# Patient Record
Sex: FEMALE | URBAN - METROPOLITAN AREA
[De-identification: names, ages, dates, MRNs, and addresses within clinical notes are randomized per-mention and may not be internally consistent; named-entity substitution may affect disease eponyms.]

---

## 2024-05-24 ENCOUNTER — NUTRITION (OUTPATIENT)
Dept: ENDOCRINOLOGY | Facility: CLINIC | Age: 74
End: 2024-05-24
Payer: MEDICARE

## 2024-05-24 DIAGNOSIS — E11.22 TYPE 2 DIABETES MELLITUS WITH DIABETIC CHRONIC KIDNEY DISEASE (MULTI): ICD-10-CM

## 2024-05-24 DIAGNOSIS — E11.22 TYPE 2 DIABETES MELLITUS WITH DIABETIC CHRONIC KIDNEY DISEASE, UNSPECIFIED CKD STAGE, UNSPECIFIED WHETHER LONG TERM INSULIN USE (MULTI): Primary | ICD-10-CM

## 2024-05-24 PROCEDURE — 97802 MEDICAL NUTRITION INDIV IN: CPT

## 2024-05-24 NOTE — PATIENT INSTRUCTIONS
Switch to a zero sugar pop/soda instead of regular to help your blood sugars  Use the Plate model of eating to help guide preparation of balanced meals   Please call [phone] if you would like to schedule a nutrition follow-up

## 2024-05-24 NOTE — PROGRESS NOTES
Nutrition: Initial Assessment    Reason for Nutrition Visit: Patient is a 73 y.o. female referred for T2DM with diabetic CKD. Referred on 5/1/24 by Dr. Morelia Sosa. Per chart review, patient has history of COPD. There is very minimal information on file for this patient so contacted patient's primary care office to fax labs, current DM medications, most recent primary care note, and patient's weight history.     Nutrition Assessment    Food & Nutrition Related History: Pt presents in office with her daughter-in-law. Uses oxygen and needs wheelchair to ambulate.     Dietary Considerations: None   Allergies: None  Intolerance: None  Appetite: Fair   GI Symptoms : loose stool   Swallowing Difficulty: No problems with swallowing  Dentition : own  Food Preparation: Patient and health aid   Cooking Skills/Barriers: None reported  Grocery Shopping: Patient and health aid   Supplements: Denies   Sleep disorders: obstructive sleep apnea, wears CPAP  Food Insecurity: Denies     Dietary Recall:   Meal 1: Bologna sandwich   Meal 2: spaghetti   Meal 2: fried chicken     Snacks: peanut butter   Beverages: water, pop   Alcohol Intake: none   Self-Identified Challenges: none     Physical Activity: minimal     Labs:  Will update once received from primary care office       Diabetes:  Diagnosed > 20 years ago   Prior Nutrition Education: no recent education   SMBG: Ankush 2 , did not bring reader   Hypoglycemia: None     Current DM Medications/Insulin Regimen:  - Basaglar - states she is taking 45 units   - Glipizide 2.5 mg   - Metformin 500 mg once daily     Nutrition Focused Physical Exam:  Performed/Deferred: Performed    Muscle Wasting:  Temporalis: Mild-Moderate (slight depression)  Pectoralis (Clavicular Region): Mild-Moderate (some protrusion of clavicle)  Deltoid/Trapezius: Mild-Moderate (slight protrusion of acromion process)  Interosseous: Mild-Moderate (slightly depressed area between thumb and forefinger)  Quadriceps:  Severe (depressions on inner and outer thigh)    Loss of Subcutaneous Fat:  Orbital Fat Pads: Mild-Moderate (slight dark circles and slight hollowing)  Buccal Fat Pads: Mild-Moderate (flat cheeks, minimal bounce)  Triceps: Mild-Moderate (less than ample fat tissue)       Other Physical Findings:  Hair: Negative  Eyes: Negative  Mouth: Negative  Skin: Negative  Nails: Negative    Past Medical History:  There is no problem list on file for this patient.       Anthropometrics:  Ht Readings from Last 1 Encounters:   No data found for Ht     BMI Readings from Last 1 Encounters:   No data found for BMI     Wt Readings from Last 10 Encounters:   No data found for Wt     Weight history: Will update once received from primary care office       Nutrition Diagnosis     Patient has Malnutrition Diagnosis: YesDiagnosis Status: New  Malnutrition Diagnosis: Moderate malnutrition related to chronic disease or condition As Evidenced by: increased nutrition needs in the setting of COPD, moderate muscle wasting (clavicle, interosseous, quadricep regions), moderate subcutaneous fat loss (orbital, buccal, tricep regions)    Patient has Nutrition Diagnosis: Yes Diagnosis Status (1): New  Nutrition Diagnosis 1: Food and nutrition related knowledge deficit Related to (1): remote nutrition education for T2DM As Evidenced by (1): patient demonstrates incomplete knowledge       Nutrition Interventions/Recommendations   FOOD & NUTRIENT DELIVERY: Consistent Carbohydrate Diet, Increased Energy Diet, and Increased Fiber Diet    COORDINATION OF CARE: Contacted patient's primary care office for updated labs, last office note, and weight history     NUTRITION EDUCATION:   Provided education on what happens in the body when prediabetes and diabetes develop. Explained why providing consistent amounts of carbohydrates in the diet is helpful for regulating blood sugar. Encouraged choosing foods that contain minimally processed complex carbohydrates,  such as high fiber whole grains, beans, starchy vegetables, whole fruits. Simple sugars from fruit juice, sugar-sweetened beverages, and foods with added sugar should be limited in the diet. Also discussed how foods like protein, some fat, and non-starchy vegetables impact blood sugar regulation.  Provided education on Plate Method as a tool for preparing balanced meals. Discussed the following: Fill 1/2 plate with non-starchy vegetables (broccoli, carrots, cauliflower, salad greens, cucumbers, tomatoes). These foods contribute very few carbohydrates, and they add fiber to the meal. Fill 1/4 plate with lean protein (meat, turkey, fish, seafood, eggs, nuts, cheese, cottage cheese, nut butter, tofu, edamame). Fill 1/4 plate with carbohydrates/starches (grains, fruits, starchy vegetables, beans, yogurt, milk). Aim for at least half of daily grains as whole grains.   Provided education on carbohydrate (carb) counting. Discussed the following:  Foods and beverages that contribute carbohydrates (fruits, grains, legumes, milk, yogurt, starchy vegetables, sugar added to foods, sugar-sweetened beverages)  Foods that contribute no or minimal carbohydrates (protein, fats, non-starchy vegetables)  Discussed replacing regular soda/pop with zero sugar beverages for better blood sugar management.   Discussed that olive oil is the preferred fat source when cooking.     Educational Handouts: Diabetes Plate Method, NCM Carbohydrate Counting for People with Diabetes    *Patient expressed understanding of the education provided and denied any additional questions/concerns.       Nutrition Monitoring and Evaluation   Consistent meal/snack pattern   A1c less than 7-8.0% without hypoglycemia   PO intake > 75% estimated energy intake       Readiness to Change : Good  Level of Understanding : Good  Anticipated Compliant : Good     Follow-up: Patient is welcome to follow-up at any time. To schedule, please call [phone].

## 2024-06-10 ENCOUNTER — HOSPITAL ENCOUNTER (OUTPATIENT)
Dept: NEUROLOGY | Facility: HOSPITAL | Age: 74
Discharge: HOME | End: 2024-06-10
Payer: MEDICARE

## 2024-06-10 DIAGNOSIS — G56.02 CARPAL TUNNEL SYNDROME, LEFT UPPER LIMB: ICD-10-CM

## 2024-06-10 PROCEDURE — 95886 MUSC TEST DONE W/N TEST COMP: CPT | Performed by: PSYCHIATRY & NEUROLOGY

## 2024-06-10 PROCEDURE — 95886 MUSC TEST DONE W/N TEST COMP: CPT | Mod: GC | Performed by: PSYCHIATRY & NEUROLOGY

## 2024-06-10 PROCEDURE — 95911 NRV CNDJ TEST 9-10 STUDIES: CPT | Performed by: PSYCHIATRY & NEUROLOGY

## 2024-06-25 ENCOUNTER — OFFICE VISIT (OUTPATIENT)
Dept: ORTHOPEDIC SURGERY | Facility: CLINIC | Age: 74
End: 2024-06-25
Payer: MEDICARE

## 2024-06-25 DIAGNOSIS — S50.02XA CONTUSION OF LEFT ELBOW, INITIAL ENCOUNTER: Primary | ICD-10-CM

## 2024-06-25 PROCEDURE — 1036F TOBACCO NON-USER: CPT | Performed by: ORTHOPAEDIC SURGERY

## 2024-06-25 PROCEDURE — 1160F RVW MEDS BY RX/DR IN RCRD: CPT | Performed by: ORTHOPAEDIC SURGERY

## 2024-06-25 PROCEDURE — 99213 OFFICE O/P EST LOW 20 MIN: CPT | Performed by: ORTHOPAEDIC SURGERY

## 2024-06-25 PROCEDURE — 1159F MED LIST DOCD IN RCRD: CPT | Performed by: ORTHOPAEDIC SURGERY

## 2024-06-25 PROCEDURE — 99203 OFFICE O/P NEW LOW 30 MIN: CPT | Performed by: ORTHOPAEDIC SURGERY

## 2024-06-25 NOTE — LETTER
June 30, 2024       No Recipients    Patient: Jenifer Hutchinson   YOB: 1950   Date of Visit: 6/25/2024       Dear Dr. Kim Recipients:    Thank you for referring Jenifer Hutchinson to me for evaluation. Below are my notes for this consultation.  If you have questions, please do not hesitate to call me. I look forward to following your patient along with you.       Sincerely,     Jarrell Olivera MD      CC:   No Recipients  ______________________________________________________________________________________    CHIEF COMPLAINT         Left elbow pain    ASSESSMENT + PLAN    Left elbow contusion, resolving    I reviewed that the x-ray does not show any significant bony injury.  Clinically, the pain is gone.  There is a little posterior and anterior bruising which should continue to resolve.  You may advance activity with no particular restrictions.    Follow-up with any additional concerns.        HISTORY OF PRESENT ILLNESS       Patient is a 74 y.o. right-hand dominant female, who presents today for evaluation of a left elbow injury.  This occurred when she fell from bed at the end of last month.  She was seen at [facility] and x-rays were obtained.  There was apparently some concern for fracture and she was placed in a splint.  She presents today, about 3-1/2 weeks later, with complete resolution of her pain.  No numbness or tingling.  No upper extremity concerns.    She has COPD and CHF, and is on oxygen by nasal cannula.  She is diabetic but not hypothyroid.  She does not smoke.      REVIEW OF SYSTEMS       A 30-item multi-system Review Of Systems was obtained on today's intake form.  This was reviewed with the patient and is correct.  The pertinent positives and negatives are listed above.  The form has been scanned separately into the medical record.      PHYSICAL EXAM    Constitutional:    Appears older than her stated age. Well-developed thin, somewhat frail appearing elderly female in no acute  distress..  Psychiatric:         Pleasant normal mood and affect. Behavior is appropriate for the situation.   Head:                   Normocephalic and atraumatic.  Eyes:                    Pupils are equal and round.  Cardiovascular:  2+ radial and ulnar pulses. Fingers well-perfused.  Respiratory:        Effort normal. No respiratory distress. Speaking in complete sentences.  Neurologic:       Alert and oriented to person, place, and time.  Skin:                Skin is intact, warm and dry.  Hematologic / Lymphatic:    No lymphedema or lymphangitis.    Extremities / Musculoskeletal:                      Splint removed from the left upper extremity.  There is a little resolving anterior and posterior ecchymosis but no swelling.  No point tenderness.  Full composite finger flexion extension.  No elbow joint effusion.  Stable collaterals.  No pain with epicondylitis testing.  Symmetric motion.  Sensation intact to light touch in all distributions.  Capillary refill less than 2 seconds.      IMAGING / LABS / EMGs           Outside x-rays were independently interpreted by me today and show no acute fracture, subluxation, or foreign body.  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      No past medical history on file.    Medication Documentation Review Audit    **Prior to Admission medications have not yet been reviewed**         Not on File    Social History     Socioeconomic History   • Marital status: Unknown     Spouse name: Not on file   • Number of children: Not on file   • Years of education: Not on file   • Highest education level: Not on file   Occupational History   • Not on file   Tobacco Use   • Smoking status: Not on file   • Smokeless tobacco: Not on file   Substance and Sexual Activity   • Alcohol use: Not on file   • Drug use: Not on file   • Sexual activity: Not on file   Other Topics Concern   • Not on file   Social History Narrative   • Not on file     Social Determinants of Health      Financial Resource Strain: Low Risk  (1/22/2024)    Received from [facility]    Overall Financial Resource Strain (CARDIA)    • Difficulty of Paying Living Expenses: Not hard at all   Recent Concern: Financial Resource Strain - Medium Risk (1/17/2024)    Received from [facility]    Overall Financial Resource Strain (CARDIA)    • Difficulty of Paying Living Expenses: Somewhat hard   Food Insecurity: No Food Insecurity (1/22/2024)    Received from [facility]    Hunger Vital Sign    • Worried About Running Out of Food in the Last Year: Never true    • Ran Out of Food in the Last Year: Never true   Transportation Needs: No Transportation Needs (1/22/2024)    Received from [facility]    PRAPARE - Transportation    • Lack of Transportation (Medical): No    • Lack of Transportation (Non-Medical): No   Physical Activity: Inactive (1/17/2024)    Received from [facility]    Exercise Vital Sign    • Days of Exercise per Week: 0 days    • Minutes of Exercise per Session: 0 min   Stress: No Stress Concern Present (1/17/2024)    Received from [facility]    [employer] - Occupational Stress Questionnaire    • Feeling of Stress : Only a little   Social Connections: Moderately Isolated (1/17/2024)    Received from [facility]    Social Connection and Isolation Panel [NHANES]    • Frequency of Communication with Friends and Family: More than three times a week    • Frequency of Social Gatherings with Friends and Family: More than three times a week    • Attends Congregation Services: More than 4 times per year    • Active Member of Clubs or Organizations: No    • Attends Club or Organization Meetings: Never    • Marital Status:    Intimate Partner Violence: Not At Risk (1/17/2024)    Received from [facility]    Humiliation, Afraid, Rape, and Kick questionnaire    • Fear of Current or Ex-Partner: No    • Emotionally Abused: No    • Physically Abused: No    • Sexually Abused:  No   Housing Stability: Low Risk  (1/22/2024)    Received from [facility]    Housing Stability Vital Sign    • Unable to Pay for Housing in the Last Year: No    • Number of Places Lived in the Last Year: 2    • Unstable Housing in the Last Year: No       No past surgical history on file.      Electronically Signed      RICH Olivera MD      Orthopaedic Hand Surgery      [phone]

## 2024-06-25 NOTE — PROGRESS NOTES
CHIEF COMPLAINT         Left elbow pain    ASSESSMENT + PLAN    Left elbow contusion, resolving    I reviewed that the x-ray does not show any significant bony injury.  Clinically, the pain is gone.  There is a little posterior and anterior bruising which should continue to resolve.  You may advance activity with no particular restrictions.    Follow-up with any additional concerns.        HISTORY OF PRESENT ILLNESS       Patient is a 74 y.o. right-hand dominant female, who presents today for evaluation of a left elbow injury.  This occurred when she fell from bed at the end of last month.  She was seen at [facility] and x-rays were obtained.  There was apparently some concern for fracture and she was placed in a splint.  She presents today, about 3-1/2 weeks later, with complete resolution of her pain.  No numbness or tingling.  No upper extremity concerns.    She has COPD and CHF, and is on oxygen by nasal cannula.  She is diabetic but not hypothyroid.  She does not smoke.      REVIEW OF SYSTEMS       A 30-item multi-system Review Of Systems was obtained on today's intake form.  This was reviewed with the patient and is correct.  The pertinent positives and negatives are listed above.  The form has been scanned separately into the medical record.      PHYSICAL EXAM    Constitutional:    Appears older than her stated age. Well-developed thin, somewhat frail appearing elderly female in no acute distress..  Psychiatric:         Pleasant normal mood and affect. Behavior is appropriate for the situation.   Head:                   Normocephalic and atraumatic.  Eyes:                    Pupils are equal and round.  Cardiovascular:  2+ radial and ulnar pulses. Fingers well-perfused.  Respiratory:        Effort normal. No respiratory distress. Speaking in complete sentences.  Neurologic:       Alert and oriented to person, place, and time.  Skin:                Skin is intact, warm and dry.  Hematologic / Lymphatic:    No  lymphedema or lymphangitis.    Extremities / Musculoskeletal:                      Splint removed from the left upper extremity.  There is a little resolving anterior and posterior ecchymosis but no swelling.  No point tenderness.  Full composite finger flexion extension.  No elbow joint effusion.  Stable collaterals.  No pain with epicondylitis testing.  Symmetric motion.  Sensation intact to light touch in all distributions.  Capillary refill less than 2 seconds.      IMAGING / LABS / EMGs           Outside x-rays were independently interpreted by me today and show no acute fracture, subluxation, or foreign body.  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      No past medical history on file.    Medication Documentation Review Audit    **Prior to Admission medications have not yet been reviewed**         Not on File    Social History     Socioeconomic History    Marital status: Unknown     Spouse name: Not on file    Number of children: Not on file    Years of education: Not on file    Highest education level: Not on file   Occupational History    Not on file   Tobacco Use    Smoking status: Not on file    Smokeless tobacco: Not on file   Substance and Sexual Activity    Alcohol use: Not on file    Drug use: Not on file    Sexual activity: Not on file   Other Topics Concern    Not on file   Social History Narrative    Not on file     Social Determinants of Health     Financial Resource Strain: Low Risk  (1/22/2024)    Received from [facility]    Overall Financial Resource Strain (CARDIA)     Difficulty of Paying Living Expenses: Not hard at all   Recent Concern: Financial Resource Strain - Medium Risk (1/17/2024)    Received from [facility]    Overall Financial Resource Strain (CARDIA)     Difficulty of Paying Living Expenses: Somewhat hard   Food Insecurity: No Food Insecurity (1/22/2024)    Received from [facility]    Hunger Vital Sign     Worried About Running Out of Food in the  Last Year: Never true     Ran Out of Food in the Last Year: Never true   Transportation Needs: No Transportation Needs (1/22/2024)    Received from [facility]    PRAPARE - Transportation     Lack of Transportation (Medical): No     Lack of Transportation (Non-Medical): No   Physical Activity: Inactive (1/17/2024)    Received from [facility]    Exercise Vital Sign     Days of Exercise per Week: 0 days     Minutes of Exercise per Session: 0 min   Stress: No Stress Concern Present (1/17/2024)    Received from [facility]    [employer] - Occupational Stress Questionnaire     Feeling of Stress : Only a little   Social Connections: Moderately Isolated (1/17/2024)    Received from [facility]    Social Connection and Isolation Panel [NHANES]     Frequency of Communication with Friends and Family: More than three times a week     Frequency of Social Gatherings with Friends and Family: More than three times a week     Attends Alevism Services: More than 4 times per year     Active Member of Clubs or Organizations: No     Attends Club or Organization Meetings: Never     Marital Status:    Intimate Partner Violence: Not At Risk (1/17/2024)    Received from [facility]    Humiliation, Afraid, Rape, and Kick questionnaire     Fear of Current or Ex-Partner: No     Emotionally Abused: No     Physically Abused: No     Sexually Abused: No   Housing Stability: Low Risk  (1/22/2024)    Received from [facility]    Housing Stability Vital Sign     Unable to Pay for Housing in the Last Year: No     Number of Places Lived in the Last Year: 2     Unstable Housing in the Last Year: No       No past surgical history on file.      Electronically Signed      RICH Olivera MD      Orthopaedic Hand Surgery      [phone]

## 2024-07-10 ENCOUNTER — APPOINTMENT (OUTPATIENT)
Dept: OPHTHALMOLOGY | Facility: CLINIC | Age: 74
End: 2024-07-10
Payer: MEDICARE

## 2024-12-11 ENCOUNTER — APPOINTMENT (OUTPATIENT)
Dept: OPHTHALMOLOGY | Facility: CLINIC | Age: 74
End: 2024-12-11
Payer: MEDICARE

## 2025-04-18 ENCOUNTER — HOSPITAL ENCOUNTER (INPATIENT)
Facility: HOSPITAL | Age: 75
DRG: 208 | End: 2025-04-18
Attending: EMERGENCY MEDICINE | Admitting: INTERNAL MEDICINE
Payer: MEDICARE

## 2025-04-18 ENCOUNTER — HOSPITAL ENCOUNTER (EMERGENCY)
Facility: HOSPITAL | Age: 75
Discharge: HOME | DRG: 208 | End: 2025-04-20
Payer: MEDICARE

## 2025-04-18 DIAGNOSIS — I50.33 HYPERTENSIVE HEART DISEASE WITH ACUTE ON CHRONIC DIASTOLIC CONGESTIVE HEART FAILURE: ICD-10-CM

## 2025-04-18 DIAGNOSIS — I11.0 HYPERTENSIVE HEART DISEASE WITH ACUTE ON CHRONIC DIASTOLIC CONGESTIVE HEART FAILURE: ICD-10-CM

## 2025-04-18 DIAGNOSIS — J69.0 ASPIRATION PNEUMONIA OF RIGHT LOWER LOBE, UNSPECIFIED ASPIRATION PNEUMONIA TYPE (MULTI): ICD-10-CM

## 2025-04-18 DIAGNOSIS — J96.22 ACUTE ON CHRONIC RESPIRATORY FAILURE WITH HYPOXIA AND HYPERCAPNIA: ICD-10-CM

## 2025-04-18 DIAGNOSIS — J96.21 ACUTE ON CHRONIC RESPIRATORY FAILURE WITH HYPOXIA AND HYPERCAPNIA: ICD-10-CM

## 2025-04-18 DIAGNOSIS — J96.22 ACUTE ON CHRONIC RESPIRATORY FAILURE WITH HYPERCAPNIA: ICD-10-CM

## 2025-04-18 DIAGNOSIS — I12.9 HYPERTENSIVE NEPHROPATHY: ICD-10-CM

## 2025-04-18 DIAGNOSIS — F41.9 ANXIETY: ICD-10-CM

## 2025-04-18 DIAGNOSIS — J18.9 PNEUMONIA OF RIGHT LOWER LOBE DUE TO INFECTIOUS ORGANISM: Primary | ICD-10-CM

## 2025-04-18 DIAGNOSIS — R19.7 DIARRHEA, UNSPECIFIED TYPE: ICD-10-CM

## 2025-04-18 LAB
ALBUMIN SERPL BCP-MCNC: 3.3 G/DL (ref 3.4–5)
ALP SERPL-CCNC: 214 U/L (ref 33–136)
ALT SERPL W P-5'-P-CCNC: 74 U/L (ref 7–45)
ANION GAP BLDA CALCULATED.4IONS-SCNC: 5 MMO/L (ref 10–25)
ANION GAP BLDA CALCULATED.4IONS-SCNC: 5 MMO/L (ref 10–25)
ANION GAP BLDV CALCULATED.4IONS-SCNC: 6 MMOL/L (ref 10–25)
ANION GAP BLDV CALCULATED.4IONS-SCNC: 8 MMOL/L (ref 10–25)
ANION GAP BLDV CALCULATED.4IONS-SCNC: 9 MMOL/L (ref 10–25)
ANION GAP SERPL CALC-SCNC: 9 MMOL/L (ref 10–20)
AST SERPL W P-5'-P-CCNC: 63 U/L (ref 9–39)
BASE EXCESS BLDA CALC-SCNC: 2 MMOL/L (ref -2–3)
BASE EXCESS BLDA CALC-SCNC: 3.1 MMOL/L (ref -2–3)
BASE EXCESS BLDV CALC-SCNC: -0.1 MMOL/L (ref -2–3)
BASE EXCESS BLDV CALC-SCNC: 0.5 MMOL/L (ref -2–3)
BASE EXCESS BLDV CALC-SCNC: 0.8 MMOL/L (ref -2–3)
BASE EXCESS BLDV CALC-SCNC: 1.4 MMOL/L (ref -2–3)
BASE EXCESS BLDV CALC-SCNC: 1.9 MMOL/L (ref -2–3)
BASOPHILS # BLD AUTO: 0.05 X10*3/UL (ref 0–0.1)
BASOPHILS NFR BLD AUTO: 0.4 %
BILIRUB SERPL-MCNC: 0.2 MG/DL (ref 0–1.2)
BNP SERPL-MCNC: 1702 PG/ML (ref 0–99)
BODY TEMPERATURE: 37 DEGREES CELSIUS
BUN SERPL-MCNC: 21 MG/DL (ref 6–23)
CA-I BLDA-SCNC: 1.27 MMOL/L (ref 1.1–1.33)
CA-I BLDA-SCNC: 1.38 MMOL/L (ref 1.1–1.33)
CA-I BLDV-SCNC: 1.34 MMOL/L (ref 1.1–1.33)
CA-I BLDV-SCNC: 1.38 MMOL/L (ref 1.1–1.33)
CA-I BLDV-SCNC: 1.39 MMOL/L (ref 1.1–1.33)
CALCIUM SERPL-MCNC: 9.2 MG/DL (ref 8.6–10.3)
CARDIAC TROPONIN I PNL SERPL HS: 11 NG/L (ref 0–13)
CHLORIDE BLDA-SCNC: 93 MMOL/L (ref 98–107)
CHLORIDE BLDA-SCNC: 93 MMOL/L (ref 98–107)
CHLORIDE BLDV-SCNC: 92 MMOL/L (ref 98–107)
CHLORIDE BLDV-SCNC: 93 MMOL/L (ref 98–107)
CHLORIDE BLDV-SCNC: 93 MMOL/L (ref 98–107)
CHLORIDE SERPL-SCNC: 93 MMOL/L (ref 98–107)
CO2 SERPL-SCNC: 31 MMOL/L (ref 21–32)
CREAT SERPL-MCNC: 0.48 MG/DL (ref 0.5–1.05)
EGFRCR SERPLBLD CKD-EPI 2021: >90 ML/MIN/1.73M*2
EOSINOPHIL # BLD AUTO: 0.19 X10*3/UL (ref 0–0.4)
EOSINOPHIL NFR BLD AUTO: 1.4 %
ERYTHROCYTE [DISTWIDTH] IN BLOOD BY AUTOMATED COUNT: 16.2 % (ref 11.5–14.5)
FLUAV RNA RESP QL NAA+PROBE: NOT DETECTED
FLUBV RNA RESP QL NAA+PROBE: NOT DETECTED
GLUCOSE BLD MANUAL STRIP-MCNC: 271 MG/DL (ref 74–99)
GLUCOSE BLD MANUAL STRIP-MCNC: 271 MG/DL (ref 74–99)
GLUCOSE BLDA-MCNC: 222 MG/DL (ref 74–99)
GLUCOSE BLDA-MCNC: 304 MG/DL (ref 74–99)
GLUCOSE BLDV-MCNC: 214 MG/DL (ref 74–99)
GLUCOSE BLDV-MCNC: 215 MG/DL (ref 74–99)
GLUCOSE BLDV-MCNC: 217 MG/DL (ref 74–99)
GLUCOSE BLDV-MCNC: 230 MG/DL (ref 74–99)
GLUCOSE BLDV-MCNC: 268 MG/DL (ref 74–99)
GLUCOSE SERPL-MCNC: 207 MG/DL (ref 74–99)
HCO3 BLDA-SCNC: 30 MMOL/L (ref 22–26)
HCO3 BLDA-SCNC: 32.6 MMOL/L (ref 22–26)
HCO3 BLDV-SCNC: 30.6 MMOL/L (ref 22–26)
HCO3 BLDV-SCNC: 30.9 MMOL/L (ref 22–26)
HCO3 BLDV-SCNC: 31.1 MMOL/L (ref 22–26)
HCO3 BLDV-SCNC: 31.2 MMOL/L (ref 22–26)
HCO3 BLDV-SCNC: 32.9 MMOL/L (ref 22–26)
HCT VFR BLD AUTO: 24.6 % (ref 36–46)
HCT VFR BLD EST: 21 % (ref 36–46)
HCT VFR BLD EST: 23 % (ref 36–46)
HGB BLD-MCNC: 7.3 G/DL (ref 12–16)
HGB BLDA-MCNC: 7.1 G/DL (ref 12–16)
HGB BLDA-MCNC: 7.7 G/DL (ref 12–16)
HGB BLDV-MCNC: 7 G/DL (ref 12–16)
HGB BLDV-MCNC: 7.1 G/DL (ref 12–16)
HGB BLDV-MCNC: 7.5 G/DL (ref 12–16)
HGB BLDV-MCNC: 7.6 G/DL (ref 12–16)
HGB BLDV-MCNC: 7.7 G/DL (ref 12–16)
IMM GRANULOCYTES # BLD AUTO: 0.59 X10*3/UL (ref 0–0.5)
IMM GRANULOCYTES NFR BLD AUTO: 4.4 % (ref 0–0.9)
INHALED O2 CONCENTRATION: 100 %
INHALED O2 CONCENTRATION: 100 %
INHALED O2 CONCENTRATION: 40 %
INHALED O2 CONCENTRATION: 65 %
INHALED O2 CONCENTRATION: 80 %
LACTATE BLDA-SCNC: 0.5 MMOL/L (ref 0.4–2)
LACTATE BLDA-SCNC: <0.3 MMOL/L (ref 0.4–2)
LACTATE BLDV-SCNC: 0.4 MMOL/L (ref 0.4–2)
LACTATE BLDV-SCNC: <0.3 MMOL/L (ref 0.4–2)
LACTATE SERPL-SCNC: 0.2 MMOL/L (ref 0.4–2)
LYMPHOCYTES # BLD AUTO: 1.85 X10*3/UL (ref 0.8–3)
LYMPHOCYTES NFR BLD AUTO: 13.7 %
MAGNESIUM SERPL-MCNC: 1.82 MG/DL (ref 1.6–2.4)
MCH RBC QN AUTO: 27.4 PG (ref 26–34)
MCHC RBC AUTO-ENTMCNC: 29.7 G/DL (ref 32–36)
MCV RBC AUTO: 93 FL (ref 80–100)
MONOCYTES # BLD AUTO: 0.87 X10*3/UL (ref 0.05–0.8)
MONOCYTES NFR BLD AUTO: 6.5 %
MRSA DNA SPEC QL NAA+PROBE: NOT DETECTED
NEUTROPHILS # BLD AUTO: 9.91 X10*3/UL (ref 1.6–5.5)
NEUTROPHILS NFR BLD AUTO: 73.6 %
NRBC BLD-RTO: 0.4 /100 WBCS (ref 0–0)
OXYHGB MFR BLDA: 97.3 % (ref 94–98)
OXYHGB MFR BLDA: 97.5 % (ref 94–98)
OXYHGB MFR BLDV: 71.8 % (ref 45–75)
OXYHGB MFR BLDV: 87.8 % (ref 45–75)
OXYHGB MFR BLDV: 92.5 % (ref 45–75)
OXYHGB MFR BLDV: 96.1 % (ref 45–75)
OXYHGB MFR BLDV: 96.6 % (ref 45–75)
PCO2 BLDA: 105 MM HG (ref 38–42)
PCO2 BLDA: 61 MM HG (ref 38–42)
PCO2 BLDV: 103 MM HG (ref 41–51)
PCO2 BLDV: 105 MM HG (ref 41–51)
PCO2 BLDV: 109 MM HG (ref 41–51)
PCO2 BLDV: 111 MM HG (ref 41–51)
PCO2 BLDV: 84 MM HG (ref 41–51)
PH BLDA: 7.1 PH (ref 7.38–7.42)
PH BLDA: 7.3 PH (ref 7.38–7.42)
PH BLDV: 7.06 PH (ref 7.33–7.43)
PH BLDV: 7.08 PH (ref 7.33–7.43)
PH BLDV: 7.08 PH (ref 7.33–7.43)
PH BLDV: 7.09 PH (ref 7.33–7.43)
PH BLDV: 7.17 PH (ref 7.33–7.43)
PHOSPHATE SERPL-MCNC: 4.5 MG/DL (ref 2.5–4.9)
PLATELET # BLD AUTO: 285 X10*3/UL (ref 150–450)
PO2 BLDA: 172 MM HG (ref 85–95)
PO2 BLDA: 333 MM HG (ref 85–95)
PO2 BLDV: 108 MM HG (ref 35–45)
PO2 BLDV: 128 MM HG (ref 35–45)
PO2 BLDV: 45 MM HG (ref 35–45)
PO2 BLDV: 64 MM HG (ref 35–45)
PO2 BLDV: 72 MM HG (ref 35–45)
POTASSIUM BLDA-SCNC: 4.9 MMOL/L (ref 3.5–5.3)
POTASSIUM BLDA-SCNC: 5.2 MMOL/L (ref 3.5–5.3)
POTASSIUM BLDV-SCNC: 5.2 MMOL/L (ref 3.5–5.3)
POTASSIUM BLDV-SCNC: 5.2 MMOL/L (ref 3.5–5.3)
POTASSIUM BLDV-SCNC: 5.3 MMOL/L (ref 3.5–5.3)
POTASSIUM BLDV-SCNC: 5.3 MMOL/L (ref 3.5–5.3)
POTASSIUM BLDV-SCNC: 5.4 MMOL/L (ref 3.5–5.3)
POTASSIUM SERPL-SCNC: 5 MMOL/L (ref 3.5–5.3)
PROT SERPL-MCNC: 6.6 G/DL (ref 6.4–8.2)
RBC # BLD AUTO: 2.66 X10*6/UL (ref 4–5.2)
RSV RNA RESP QL NAA+PROBE: NOT DETECTED
SAO2 % BLDA: 100 % (ref 94–100)
SAO2 % BLDA: 100 % (ref 94–100)
SAO2 % BLDV: 74 % (ref 45–75)
SAO2 % BLDV: 90 % (ref 45–75)
SAO2 % BLDV: 95 % (ref 45–75)
SAO2 % BLDV: 99 % (ref 45–75)
SAO2 % BLDV: 99 % (ref 45–75)
SARS-COV-2 RNA RESP QL NAA+PROBE: NOT DETECTED
SODIUM BLDA-SCNC: 123 MMOL/L (ref 136–145)
SODIUM BLDA-SCNC: 125 MMOL/L (ref 136–145)
SODIUM BLDV-SCNC: 124 MMOL/L (ref 136–145)
SODIUM BLDV-SCNC: 126 MMOL/L (ref 136–145)
SODIUM BLDV-SCNC: 126 MMOL/L (ref 136–145)
SODIUM BLDV-SCNC: 128 MMOL/L (ref 136–145)
SODIUM BLDV-SCNC: 128 MMOL/L (ref 136–145)
SODIUM SERPL-SCNC: 128 MMOL/L (ref 136–145)
WBC # BLD AUTO: 13.5 X10*3/UL (ref 4.4–11.3)

## 2025-04-18 PROCEDURE — 5A1945Z RESPIRATORY VENTILATION, 24-96 CONSECUTIVE HOURS: ICD-10-PCS

## 2025-04-18 PROCEDURE — 2500000004 HC RX 250 GENERAL PHARMACY W/ HCPCS (ALT 636 FOR OP/ED): Mod: JZ

## 2025-04-18 PROCEDURE — 2020000001 HC ICU ROOM DAILY

## 2025-04-18 PROCEDURE — 83880 ASSAY OF NATRIURETIC PEPTIDE: CPT

## 2025-04-18 PROCEDURE — 2500000004 HC RX 250 GENERAL PHARMACY W/ HCPCS (ALT 636 FOR OP/ED): Mod: JZ | Performed by: INTERNAL MEDICINE

## 2025-04-18 PROCEDURE — 87040 BLOOD CULTURE FOR BACTERIA: CPT | Mod: AHULAB

## 2025-04-18 PROCEDURE — 36415 COLL VENOUS BLD VENIPUNCTURE: CPT

## 2025-04-18 PROCEDURE — 2500000002 HC RX 250 W HCPCS SELF ADMINISTERED DRUGS (ALT 637 FOR MEDICARE OP, ALT 636 FOR OP/ED)

## 2025-04-18 PROCEDURE — 82435 ASSAY OF BLOOD CHLORIDE: CPT | Performed by: EMERGENCY MEDICINE

## 2025-04-18 PROCEDURE — 71045 X-RAY EXAM CHEST 1 VIEW: CPT | Performed by: STUDENT IN AN ORGANIZED HEALTH CARE EDUCATION/TRAINING PROGRAM

## 2025-04-18 PROCEDURE — 2500000002 HC RX 250 W HCPCS SELF ADMINISTERED DRUGS (ALT 637 FOR MEDICARE OP, ALT 636 FOR OP/ED): Performed by: INTERNAL MEDICINE

## 2025-04-18 PROCEDURE — 94799 UNLISTED PULMONARY SVC/PX: CPT

## 2025-04-18 PROCEDURE — 82947 ASSAY GLUCOSE BLOOD QUANT: CPT

## 2025-04-18 PROCEDURE — 84132 ASSAY OF SERUM POTASSIUM: CPT

## 2025-04-18 PROCEDURE — 94640 AIRWAY INHALATION TREATMENT: CPT

## 2025-04-18 PROCEDURE — 4500999001 HC ED NO CHARGE

## 2025-04-18 PROCEDURE — 99283 EMERGENCY DEPT VISIT LOW MDM: CPT

## 2025-04-18 PROCEDURE — 84484 ASSAY OF TROPONIN QUANT: CPT

## 2025-04-18 PROCEDURE — 82947 ASSAY GLUCOSE BLOOD QUANT: CPT | Performed by: EMERGENCY MEDICINE

## 2025-04-18 PROCEDURE — 2500000001 HC RX 250 WO HCPCS SELF ADMINISTERED DRUGS (ALT 637 FOR MEDICARE OP)

## 2025-04-18 PROCEDURE — 85025 COMPLETE CBC W/AUTO DIFF WBC: CPT

## 2025-04-18 PROCEDURE — 0B21XFZ CHANGE TRACHEOSTOMY DEVICE IN TRACHEA, EXTERNAL APPROACH: ICD-10-PCS

## 2025-04-18 PROCEDURE — 82435 ASSAY OF BLOOD CHLORIDE: CPT

## 2025-04-18 PROCEDURE — 2500000004 HC RX 250 GENERAL PHARMACY W/ HCPCS (ALT 636 FOR OP/ED)

## 2025-04-18 PROCEDURE — 94002 VENT MGMT INPAT INIT DAY: CPT | Mod: CCI

## 2025-04-18 PROCEDURE — 83605 ASSAY OF LACTIC ACID: CPT

## 2025-04-18 PROCEDURE — 83605 ASSAY OF LACTIC ACID: CPT | Performed by: EMERGENCY MEDICINE

## 2025-04-18 PROCEDURE — 96368 THER/DIAG CONCURRENT INF: CPT

## 2025-04-18 PROCEDURE — 96375 TX/PRO/DX INJ NEW DRUG ADDON: CPT

## 2025-04-18 PROCEDURE — 99291 CRITICAL CARE FIRST HOUR: CPT | Performed by: INTERNAL MEDICINE

## 2025-04-18 PROCEDURE — 87637 SARSCOV2&INF A&B&RSV AMP PRB: CPT

## 2025-04-18 PROCEDURE — 83735 ASSAY OF MAGNESIUM: CPT

## 2025-04-18 PROCEDURE — 82330 ASSAY OF CALCIUM: CPT | Performed by: EMERGENCY MEDICINE

## 2025-04-18 PROCEDURE — 99291 CRITICAL CARE FIRST HOUR: CPT | Mod: 25 | Performed by: EMERGENCY MEDICINE

## 2025-04-18 PROCEDURE — 96365 THER/PROPH/DIAG IV INF INIT: CPT

## 2025-04-18 PROCEDURE — 99285 EMERGENCY DEPT VISIT HI MDM: CPT | Mod: 25 | Performed by: EMERGENCY MEDICINE

## 2025-04-18 PROCEDURE — 2500000002 HC RX 250 W HCPCS SELF ADMINISTERED DRUGS (ALT 637 FOR MEDICARE OP, ALT 636 FOR OP/ED): Mod: JZ | Performed by: EMERGENCY MEDICINE

## 2025-04-18 PROCEDURE — 87641 MR-STAPH DNA AMP PROBE: CPT

## 2025-04-18 PROCEDURE — 82805 BLOOD GASES W/O2 SATURATION: CPT

## 2025-04-18 PROCEDURE — 84100 ASSAY OF PHOSPHORUS: CPT

## 2025-04-18 RX ORDER — AMMONIUM LACTATE 12 G/100G
1 LOTION TOPICAL DAILY
Status: DISCONTINUED | OUTPATIENT
Start: 2025-04-19 | End: 2025-04-25 | Stop reason: HOSPADM

## 2025-04-18 RX ORDER — LORAZEPAM 2 MG/ML
2 INJECTION INTRAMUSCULAR ONCE
Status: COMPLETED | OUTPATIENT
Start: 2025-04-18 | End: 2025-04-18

## 2025-04-18 RX ORDER — IPRATROPIUM BROMIDE AND ALBUTEROL SULFATE 2.5; .5 MG/3ML; MG/3ML
3 SOLUTION RESPIRATORY (INHALATION) 4 TIMES DAILY PRN
COMMUNITY
Start: 2025-02-19

## 2025-04-18 RX ORDER — SODIUM CHLORIDE 1000 MG
2 TABLET, SOLUBLE MISCELLANEOUS 3 TIMES DAILY
COMMUNITY

## 2025-04-18 RX ORDER — FAMOTIDINE 10 MG/ML
20 INJECTION, SOLUTION INTRAVENOUS 2 TIMES DAILY
Status: DISCONTINUED | OUTPATIENT
Start: 2025-04-18 | End: 2025-04-20

## 2025-04-18 RX ORDER — CARVEDILOL 25 MG/1
25 TABLET ORAL 2 TIMES DAILY
Status: DISCONTINUED | OUTPATIENT
Start: 2025-04-18 | End: 2025-04-25 | Stop reason: HOSPADM

## 2025-04-18 RX ORDER — CITALOPRAM 10 MG/1
10 TABLET ORAL DAILY
COMMUNITY
Start: 2024-12-03

## 2025-04-18 RX ORDER — FAMOTIDINE 20 MG/1
20 TABLET, FILM COATED ORAL 2 TIMES DAILY
Status: DISCONTINUED | OUTPATIENT
Start: 2025-04-18 | End: 2025-04-20

## 2025-04-18 RX ORDER — PHENYLEPHRINE HCL IN 0.9% NACL 1 MG/10 ML
SYRINGE (ML) INTRAVENOUS
Status: DISCONTINUED
Start: 2025-04-18 | End: 2025-04-18 | Stop reason: WASHOUT

## 2025-04-18 RX ORDER — ALBUTEROL SULFATE 0.83 MG/ML
15 SOLUTION RESPIRATORY (INHALATION) ONCE
Status: COMPLETED | OUTPATIENT
Start: 2025-04-18 | End: 2025-04-18

## 2025-04-18 RX ORDER — ATORVASTATIN CALCIUM 40 MG/1
40 TABLET, FILM COATED ORAL NIGHTLY
Status: DISCONTINUED | OUTPATIENT
Start: 2025-04-18 | End: 2025-04-25 | Stop reason: HOSPADM

## 2025-04-18 RX ORDER — TAMSULOSIN HYDROCHLORIDE 0.4 MG/1
0.4 CAPSULE ORAL NIGHTLY
Status: DISCONTINUED | OUTPATIENT
Start: 2025-04-18 | End: 2025-04-18

## 2025-04-18 RX ORDER — CITALOPRAM 20 MG/1
10 TABLET, FILM COATED ORAL DAILY
Status: DISCONTINUED | OUTPATIENT
Start: 2025-04-19 | End: 2025-04-18

## 2025-04-18 RX ORDER — TAMSULOSIN HYDROCHLORIDE 0.4 MG/1
0.4 CAPSULE ORAL NIGHTLY
COMMUNITY

## 2025-04-18 RX ORDER — AMMONIUM LACTATE 12 G/100G
1 LOTION TOPICAL DAILY
COMMUNITY
Start: 2025-03-19

## 2025-04-18 RX ORDER — INSULIN LISPRO 100 [IU]/ML
0-10 INJECTION, SOLUTION INTRAVENOUS; SUBCUTANEOUS EVERY 4 HOURS
Status: DISCONTINUED | OUTPATIENT
Start: 2025-04-18 | End: 2025-04-20

## 2025-04-18 RX ORDER — ASPIRIN 81 MG/1
81 TABLET ORAL DAILY
COMMUNITY
Start: 2025-03-23

## 2025-04-18 RX ORDER — DEXTROSE 50 % IN WATER (D50W) INTRAVENOUS SYRINGE
25
Status: DISCONTINUED | OUTPATIENT
Start: 2025-04-18 | End: 2025-04-25 | Stop reason: HOSPADM

## 2025-04-18 RX ORDER — APIXABAN 5 MG/1
5 TABLET, FILM COATED ORAL 2 TIMES DAILY
COMMUNITY
Start: 2025-04-16

## 2025-04-18 RX ORDER — LOSARTAN POTASSIUM 50 MG/1
100 TABLET ORAL DAILY
Status: DISCONTINUED | OUTPATIENT
Start: 2025-04-19 | End: 2025-04-18

## 2025-04-18 RX ORDER — MAGNESIUM SULFATE HEPTAHYDRATE 40 MG/ML
2 INJECTION, SOLUTION INTRAVENOUS ONCE
Status: COMPLETED | OUTPATIENT
Start: 2025-04-18 | End: 2025-04-18

## 2025-04-18 RX ORDER — ENOXAPARIN SODIUM 100 MG/ML
40 INJECTION SUBCUTANEOUS EVERY 24 HOURS
Status: DISCONTINUED | OUTPATIENT
Start: 2025-04-18 | End: 2025-04-18

## 2025-04-18 RX ORDER — LOSARTAN POTASSIUM 50 MG/1
100 TABLET ORAL DAILY
COMMUNITY

## 2025-04-18 RX ORDER — PANTOPRAZOLE SODIUM 40 MG/1
40 TABLET, DELAYED RELEASE ORAL
COMMUNITY
Start: 2025-04-16

## 2025-04-18 RX ORDER — IPRATROPIUM BROMIDE AND ALBUTEROL SULFATE 2.5; .5 MG/3ML; MG/3ML
3 SOLUTION RESPIRATORY (INHALATION)
Status: DISCONTINUED | OUTPATIENT
Start: 2025-04-18 | End: 2025-04-22

## 2025-04-18 RX ORDER — FLUTICASONE PROPIONATE 50 MCG
1 SPRAY, SUSPENSION (ML) NASAL DAILY
COMMUNITY
Start: 2024-12-03

## 2025-04-18 RX ORDER — SODIUM CHLORIDE 1000 MG
2 TABLET, SOLUBLE MISCELLANEOUS 3 TIMES DAILY
Status: DISCONTINUED | OUTPATIENT
Start: 2025-04-18 | End: 2025-04-25 | Stop reason: HOSPADM

## 2025-04-18 RX ORDER — LORAZEPAM 1 MG/1
1 TABLET ORAL EVERY 4 HOURS PRN
COMMUNITY
End: 2025-04-25 | Stop reason: HOSPADM

## 2025-04-18 RX ORDER — IPRATROPIUM BROMIDE AND ALBUTEROL SULFATE 2.5; .5 MG/3ML; MG/3ML
9 SOLUTION RESPIRATORY (INHALATION) ONCE
Status: COMPLETED | OUTPATIENT
Start: 2025-04-18 | End: 2025-04-18

## 2025-04-18 RX ORDER — OXYCODONE HYDROCHLORIDE 5 MG/1
5 TABLET ORAL EVERY 8 HOURS PRN
COMMUNITY

## 2025-04-18 RX ORDER — DEXTROSE 50 % IN WATER (D50W) INTRAVENOUS SYRINGE
12.5
Status: DISCONTINUED | OUTPATIENT
Start: 2025-04-18 | End: 2025-04-25 | Stop reason: HOSPADM

## 2025-04-18 RX ORDER — CARVEDILOL 25 MG/1
25 TABLET ORAL 2 TIMES DAILY
COMMUNITY
Start: 2025-04-16

## 2025-04-18 RX ORDER — ATORVASTATIN CALCIUM 40 MG/1
40 TABLET, FILM COATED ORAL NIGHTLY
COMMUNITY
Start: 2025-04-16

## 2025-04-18 RX ORDER — LORAZEPAM 2 MG/ML
INJECTION INTRAMUSCULAR
Status: COMPLETED
Start: 2025-04-18 | End: 2025-04-18

## 2025-04-18 RX ADMIN — PIPERACILLIN SODIUM AND TAZOBACTAM SODIUM 4.5 G: 4; .5 INJECTION, SOLUTION INTRAVENOUS at 15:04

## 2025-04-18 RX ADMIN — PIPERACILLIN SODIUM AND TAZOBACTAM SODIUM 3.38 G: 3; .375 INJECTION, SOLUTION INTRAVENOUS at 21:00

## 2025-04-18 RX ADMIN — IPRATROPIUM BROMIDE AND ALBUTEROL SULFATE 9 ML: .5; 3 SOLUTION RESPIRATORY (INHALATION) at 14:22

## 2025-04-18 RX ADMIN — ATORVASTATIN CALCIUM 40 MG: 40 TABLET, FILM COATED ORAL at 21:51

## 2025-04-18 RX ADMIN — AZITHROMYCIN MONOHYDRATE 500 MG: 500 INJECTION, POWDER, LYOPHILIZED, FOR SOLUTION INTRAVENOUS at 16:10

## 2025-04-18 RX ADMIN — MAGNESIUM SULFATE HEPTAHYDRATE 2 G: 40 INJECTION, SOLUTION INTRAVENOUS at 15:03

## 2025-04-18 RX ADMIN — SODIUM CHLORIDE 500 ML: 0.9 INJECTION, SOLUTION INTRAVENOUS at 17:20

## 2025-04-18 RX ADMIN — LORAZEPAM 2 MG: 2 INJECTION INTRAMUSCULAR; INTRAVENOUS at 17:20

## 2025-04-18 RX ADMIN — APIXABAN 5 MG: 5 TABLET, FILM COATED ORAL at 21:51

## 2025-04-18 RX ADMIN — SODIUM CHLORIDE 2 G: 1 TABLET ORAL at 21:51

## 2025-04-18 RX ADMIN — ALBUTEROL SULFATE 15 MG: 2.5 SOLUTION RESPIRATORY (INHALATION) at 16:42

## 2025-04-18 RX ADMIN — LORAZEPAM 2 MG: 2 INJECTION INTRAMUSCULAR at 17:20

## 2025-04-18 RX ADMIN — METHYLPREDNISOLONE SODIUM SUCCINATE 125 MG: 125 INJECTION, POWDER, FOR SOLUTION INTRAMUSCULAR; INTRAVENOUS at 15:17

## 2025-04-18 RX ADMIN — IPRATROPIUM BROMIDE AND ALBUTEROL SULFATE 3 ML: 2.5; .5 SOLUTION RESPIRATORY (INHALATION) at 23:18

## 2025-04-18 RX ADMIN — VANCOMYCIN HYDROCHLORIDE 1.25 G: 1.25 INJECTION, POWDER, LYOPHILIZED, FOR SOLUTION INTRAVENOUS at 17:19

## 2025-04-18 SDOH — SOCIAL STABILITY: SOCIAL INSECURITY: DO YOU FEEL UNSAFE GOING BACK TO THE PLACE WHERE YOU ARE LIVING?: UNABLE TO ASSESS

## 2025-04-18 SDOH — SOCIAL STABILITY: SOCIAL INSECURITY: ABUSE: ADULT

## 2025-04-18 SDOH — SOCIAL STABILITY: SOCIAL INSECURITY: DO YOU FEEL ANYONE HAS EXPLOITED OR TAKEN ADVANTAGE OF YOU FINANCIALLY OR OF YOUR PERSONAL PROPERTY?: UNABLE TO ASSESS

## 2025-04-18 SDOH — SOCIAL STABILITY: SOCIAL INSECURITY: HAVE YOU HAD ANY THOUGHTS OF HARMING ANYONE ELSE?: UNABLE TO ASSESS

## 2025-04-18 SDOH — SOCIAL STABILITY: SOCIAL INSECURITY: DOES ANYONE TRY TO KEEP YOU FROM HAVING/CONTACTING OTHER FRIENDS OR DOING THINGS OUTSIDE YOUR HOME?: UNABLE TO ASSESS

## 2025-04-18 SDOH — SOCIAL STABILITY: SOCIAL INSECURITY: ARE THERE ANY APPARENT SIGNS OF INJURIES/BEHAVIORS THAT COULD BE RELATED TO ABUSE/NEGLECT?: UNABLE TO ASSESS

## 2025-04-18 SDOH — SOCIAL STABILITY: SOCIAL INSECURITY: HAVE YOU HAD THOUGHTS OF HARMING ANYONE ELSE?: UNABLE TO ASSESS

## 2025-04-18 SDOH — SOCIAL STABILITY: SOCIAL INSECURITY: WERE YOU ABLE TO COMPLETE ALL THE BEHAVIORAL HEALTH SCREENINGS?: NO

## 2025-04-18 SDOH — SOCIAL STABILITY: SOCIAL INSECURITY: HAS ANYONE EVER THREATENED TO HURT YOUR FAMILY OR YOUR PETS?: UNABLE TO ASSESS

## 2025-04-18 SDOH — SOCIAL STABILITY: SOCIAL INSECURITY: ARE YOU OR HAVE YOU BEEN THREATENED OR ABUSED PHYSICALLY, EMOTIONALLY, OR SEXUALLY BY ANYONE?: UNABLE TO ASSESS

## 2025-04-18 ASSESSMENT — LIFESTYLE VARIABLES
AUDIT-C TOTAL SCORE: -1
SKIP TO QUESTIONS 9-10: 0
HOW OFTEN DO YOU HAVE 6 OR MORE DRINKS ON ONE OCCASION: PATIENT UNABLE TO ANSWER
HOW OFTEN DO YOU HAVE A DRINK CONTAINING ALCOHOL: PATIENT UNABLE TO ANSWER
AUDIT-C TOTAL SCORE: -1
HOW MANY STANDARD DRINKS CONTAINING ALCOHOL DO YOU HAVE ON A TYPICAL DAY: PATIENT UNABLE TO ANSWER

## 2025-04-18 ASSESSMENT — PAIN SCALES - PAIN ASSESSMENT IN ADVANCED DEMENTIA (PAINAD)
CONSOLABILITY: NO NEED TO CONSOLE
BREATHING: NORMAL
BODYLANGUAGE: RELAXED
TOTALSCORE: 0
FACIALEXPRESSION: SMILING OR INEXPRESSIVE

## 2025-04-18 ASSESSMENT — PAIN - FUNCTIONAL ASSESSMENT
PAIN_FUNCTIONAL_ASSESSMENT: 0-10
PAIN_FUNCTIONAL_ASSESSMENT: PAINAD (PAIN ASSESSMENT IN ADVANCED DEMENTIA SCALE)

## 2025-04-18 ASSESSMENT — ACTIVITIES OF DAILY LIVING (ADL)
FEEDING YOURSELF: UNABLE TO ASSESS
JUDGMENT_ADEQUATE_SAFELY_COMPLETE_DAILY_ACTIVITIES: UNABLE TO ASSESS
ADEQUATE_TO_COMPLETE_ADL: UNABLE TO ASSESS
TOILETING: UNABLE TO ASSESS
DRESSING YOURSELF: UNABLE TO ASSESS
PATIENT'S MEMORY ADEQUATE TO SAFELY COMPLETE DAILY ACTIVITIES?: UNABLE TO ASSESS
HEARING - RIGHT EAR: UNABLE TO ASSESS
HEARING - LEFT EAR: UNABLE TO ASSESS
BATHING: UNABLE TO ASSESS
GROOMING: UNABLE TO ASSESS
WALKS IN HOME: UNABLE TO ASSESS

## 2025-04-18 ASSESSMENT — PAIN SCALES - GENERAL: PAINLEVEL_OUTOF10: 0 - NO PAIN

## 2025-04-18 ASSESSMENT — COLUMBIA-SUICIDE SEVERITY RATING SCALE - C-SSRS
1. IN THE PAST MONTH, HAVE YOU WISHED YOU WERE DEAD OR WISHED YOU COULD GO TO SLEEP AND NOT WAKE UP?: NO
2. HAVE YOU ACTUALLY HAD ANY THOUGHTS OF KILLING YOURSELF?: NO
6. HAVE YOU EVER DONE ANYTHING, STARTED TO DO ANYTHING, OR PREPARED TO DO ANYTHING TO END YOUR LIFE?: NO

## 2025-04-18 ASSESSMENT — COGNITIVE AND FUNCTIONAL STATUS - GENERAL: PATIENT BASELINE BEDBOUND: YES

## 2025-04-18 NOTE — ED PROVIDER NOTES
History of Present Illness     History provided by: Family Member and EMS  Limitations to History: Altered Mental Status and Respiratory Distress  External Records Reviewed with Brief Summary:  Previous ED and hospital records for past medical history, patient has additional chart with further information trached and February of this year for respiratory failure and inability to wean from vent    HPI:  Evelyn Hutchinson is a 74 y.o. female with History of diabetes hypertension hyperlipidemia heart failure with preserved ejection fraction stroke carotid stenosis aortic regurgitation PE on anticoagulation COPD with chronic respiratory failure requiring tracheostomy not on baseline oxygen,  who presents for evaluation of altered mental status from nursing facility.  Per staff they found her altered typically ANO 1-2 now ANO x 0 and was found to be hypoxic to 70% they backed her through her trach up to 100% but still was not acting her normal self 911 was called and patient was transferred here no medications en route.  She is shaking her head to yes/no questions but not speaking breathing quickly and appears very uncomfortable.  Per family she was her normal self yesterday when they were visiting with her and this is a perez change.  She has not been having any fevers or chills.    Physical Exam   Triage vitals:  T 35.7 °C (96.2 °F)  HR 94  /76  RR 20  O2 100 % Supplemental oxygen    General: Awake, alert, ill-appearing  Eyes: Gaze conjugate.  No scleral icterus or injection  HENT: Normo-cephalic, atraumatic. No stridor, 6 traeo uncuffed trach in place  CV: Regular rate, regular rhythm. Radial pulses 2+ bilaterally  Resp: Breathing labored, mild wheezing throughout with mild decrease in air movement  GI: Soft, non-distended, non-tender. No rebound or guarding.  PEG in place  : Normal female external genitalia no bloody stool on depends  MSK/Extremities: No gross bony deformities. Moving all  extremities  Skin: Warm.  Diaphoretic appropriate color  Neuro: A and O x 0. Face symmetric.  Gross strength and sensation intact in b/l UE and LEs      Medical Decision Making & ED Course   Scoring Tools Utilized:   SEP-1 CORE MEASURE DATA  I suspected severe sepsis with the following organ dysfunction: acute metabolic encephalopathy and acute hypoxic/hypercarbic respiratory failure on 4/18/2025  2:11 PM.    Visit Vitals  /66   Pulse 75   Temp (!) 5 °C (41 °F)   Resp 18   SpO2 100%        Lab Results   Component Value Date/Time    WBC 7.6 04/19/2025 0538    Creatinine 0.58 04/19/2025 0538    Bilirubin, Total 0.3 04/19/2025 0538    Platelets 225 04/19/2025 0538    Glucose 147 (H) 04/19/2025 0538    POCT Glucose 200 (H) 04/20/2025 0009    POCT Glucose 151 (H) 04/19/2025 1938    POCT Glucose 163 (H) 04/19/2025 1648        A targeted fluid bolus of less than 30ml/kg actual body weight, due to Concern for CHF.  Targeted fluid bolus of 500mL was given.    Suspected infection source   Pulmonary    Patient recently received an antibiotic (last 24 hours)       Date/Time Action Medication Dose Rate    04/18/25 1610 New Bag    azithromycin (Zithromax) 500 mg in dextrose 5%  mL 500 mg 250 mL/hr    04/18/25 1504 New Bag    piperacillin-tazobactam (Zosyn) 4.5 g in dextrose (iso)  mL 4.5 g         Medical decision making/complexity  Patient is a 74-year-old with COPD chronic hypoxic respiratory failure recent tracheostomy in the last few months heart failure insulin-dependent diabetes who presents with acute altered mental status and hypoxia from nursing facility.  On initial presentation patient is in respiratory distress appears very uncomfortable is ANO x 0.  She was hypoxic again to the low 80s placed on blow-by with improvement in her saturations but no improvement in mental status.  Blood gas shows significant respiratory acidosis with hypercapnia without metabolic compensation.  Patient was placed on high  flow through her trach as she had just went OR for trach management 10 days ago and as such wanted to leave current trach in place if possible.  Patient received steroids DuoNebs magnesium for COPD treatment, after which patient still was significantly hypercapnic with only minimal improvement and continuous albuterol was started.  Patient's lungs did sound more open after these treatments.  Unfortunately her mental status was not improving.  Patient still working to breathe and appears uncomfortable.  Chest x-ray obtained for the patient showing right lower lobe opacity concerning for potential pneumonia and was treated with broad-spectrum antibiotics and small fluid bolus per sepsis orders.  Patient's viral panel was negative.  No troponinemia the patient does have an elevated BNP concerning for potential cardiac component for her shortness of breath.  She does have an acute transaminitis no renal dysfunction no significant electrolyte derangements she does have a leukocytosis and anemia to 7.3 nontransfusion well.  She does have a neutrophil predominance.  I believe that this patient likely has a pneumonia or aspiration event/pneumonitis precipitating COPD exacerbation with hypercapnia leading to depressed mental status.  Given no significant improvement in mental status she was switched to a cuffed Shiley 6 trach and was ventilated.  I performed a sepsis reperfusion exam on [facility] on 04/20/25 at 3:23 AM     Given patient's ongoing hypercapnic respiratory failure altered mental status and need for ventilation she was admitted to the medical ICU for further management    Differential diagnoses considered include but are not limited to: Encephalopathy due to infection metabolic derangement hypercapnia hypoxia hyper hypoglycemia less likely seizure/postictal less likely trauma less likely stroke less likely uremia or hyperammonemia     Social Determinants of Health which Significantly Impact Care: None  identified     EKG Independent Interpretation: EKG interpreted by myself. Please see ED Course and MDM for full interpretation.    Independent Result Review and Interpretation: Results were independently reviewed and interpreted by myself. Please see ED course and MDM for full interpretation.    Chronic conditions affecting the patient's care: As documented in the MDM    The patient was discussed with the following consultants/services:  Lauren ICU attending who accepted the patient for admission      Care Considerations: As per [facility]    ED Course:  ED Course as of 04/20/25 0323 Fri Apr 18, 2025   1609 Na 125 yesterday   [SC]   1610 [id number] MRN for alternate chart [SC]   1610 Blood Gas Venous Full Panel(!!)  Hypercapnic respiratory failure with respiratory acidosis without appropriate metabolic compensation no elevation in lactate [SC]   1610 Blood Gas Arterial Full Panel(!!)  Mild improvement on interval arterial blood gas with pH of 7.1 respiratory acidosis with hypercapnia O2 reduced [SC]   1611 Comprehensive Metabolic Panel(!)  Hyponatremia slightly improved from baseline mild hyperglycemia no renal dysfunction patient does have a transaminitis with elevated alkaline phosphatase [SC]   1640 EKG independently interpreted with sinus rhythm with first-degree AV block rate of 77 AL interval prolonged at 212 QRS wide at 142 secondary to right bundle branch block and QT prolonged at 516.  No acute ischemic changes.  Regular axis. [SC]      ED Course User Index  [SC] Luna Forde DO         Diagnoses as of 04/20/25 0323   Acute on chronic respiratory failure with hypoxia and hypercapnia   Aspiration pneumonia of right lower lobe, unspecified aspiration pneumonia type (Multi)   Pneumonia of right lower lobe due to infectious organism     Disposition   Adm to MICU    Procedures   Tracheostomy tube exchange    Performed by: Luna Forde DO  Authorized by: Dain Berkowitz MD    Consent:     Consent obtained:   Verbal    Consent given by:  Healthcare agent  Indications:     Indications:  Need for mechanical ventilation  Procedure specific details:      Patient's 6-0 traceo cuffless trach replaced with 6-0 shiley cuffed trach.   Post-procedure details:     Procedure completion:  Tolerated well, no immediate complications      Patient seen and discussed with ED attending physician.    Luna Forde DO  Emergency Medicine     Luna Forde DO  Resident  04/20/25 032

## 2025-04-18 NOTE — ED TRIAGE NOTES
Pt coming in for altered mental status. Per family was alert and talking yesterday but now is currently A&Ox 0.

## 2025-04-18 NOTE — NURSING NOTE
Patient arrived to room via stretcher accompanied by SEPIDEH Abad, and RT ED. Assisted with pullover to bed. Patient trying to pull out peg tube. Dr Quinones came to bedside verbal order for wrist restraints given. Patient vital signs stable. Skin intact. Patient not responding to commands.

## 2025-04-18 NOTE — H&P
Critical Care Medicine History and Physical      Impression/Plan: Evelyn Hutchinson is a 73 y/o F with a Pmhx of COPD, HFpEF, PE, AoS, inability to wean from vent support, prompting tracheostomy (uncuffed at baseline), who is admitted to the ICU from the ED on 4/18/2025 with acute on chronic respiratory failure with hypercapnia (pCO2 105).  On-going issues include the following:     Acute metabolic encephalopathy d/t acute respiratory failure with hypercapnia   Hypercapnia exacerbated by hyperoxia (paO2 > 300 mmHg)  Hypercapnia on presentation possibly d/t atypical pneumonia   Hyponatremia: chronicity is unclear   Chronic physical debilitation         Neuro  - A-F bundle   - Sleep Hygiene measures: appropriate daytime stimulation/physical activity. Lights out at 2100, avoidance of night time lab draws/night baths, avoidance of delirium provoking medications  - hold sedation d/t hypercapnia       CV  - goal MAP >/= 65 mm hg         Pulm/ID:   - ED staff instructed to replace un-cuffed tracheostomy w/cuffed trach and initiate mechanical vent support  - Vent support: adjust settings as needed to maintain SpO2 > 88%, pH >7.25, Ppl < 30 cm H2O while optimizing patient vent synchrony   - Bronchopulmonary Hygiene: scheduled duonebs QID per RT  - empiric Abx: Pip-tazo + Azithromycin   - urine legionella Ag + strep Ag   - blood and sputum cx's pending       Renal/electrolyte/acid-base:   - avoid nephrotoxins as much as possible   - replace electrolytes as indicated     GI/Nutrition   - NPO, If able to wean from vent once hypercapnia corrects will eval swallow capacity at bedside   - GI ppx: PPI        Heme   - DVT ppx: SCenox    Endocrine  - goal serum glucose 140-180 mg/dL     Musculoskeletal/skin  - skin protective measures   - PT/OT     Family contact:  Yazmin Restrepo (Daughter)    The following is obtained via sign-out from ED Resident as there is minimal historical data in patient's chart at time of this H&P writing.      HPI:Evelyn Hutchinson is a 75 y/o F with a Pmhx of COPD, HFpEF, PE, AoS, carotid stenosis inability to wean from vent support, prompting tracheostomy (uncuffed at baseline), who presented to the ICU with acute encephalopathy in the setting of hypercapnia (pCO2 8 admitted to the ICU from the ED on 4/18/2025 with acute on chronic respiratory failure with hypercapnia (7.08, pvCO2 111). Attempts to treat hypercapnia were made via high flow oxygen through uncuffed tracheostomy. Subsequent ABG demons       Medical History[1]  Surgical History[2]  Prescriptions Prior to Admission[3]  Patient has no known allergies.  Social History[4]  Family History[5]    Scheduled Medications:   Scheduled Medications[6]     Continuous Medications:   Continuous Medications[7]     PRN Medications:   PRN Medications[8]    Review of Systems:  Not able to obtain d/t patient factors          Objective   Vitals:  Most Recent:  Vitals:    04/18/25 1629   BP: 106/59   Pulse: 96   Resp: 20   Temp:    SpO2: 100%       24hr Min/Max:  Temp  Min: 35.7 °C (96.2 °F)  Max: 35.7 °C (96.2 °F)  Pulse  Min: 94  Max: 96  BP  Min: 106/59  Max: 162/72  Resp  Min: 20  Max: 20  SpO2  Min: 87 %  Max: 100 %    LDA:   Surgical Airway Tracoe Fenestrated;Uncuffed 6 (Active)   Placement Date/Time: 04/18/25 1409   Placed by External Staff?: Other (Comment)  Surgical Airway Type: Tracheostomy  Brand: Tracoe  Style: Fenestrated;Uncuffed  Size (mm): 6   Number of days: 0         Vent settings:  FiO2 (%):  [80 %] 80 %    Hemodynamic parameters for last 24 hours:       No intake/output data recorded.      Physical exam:    Gen: cachectic, elderly female, on vent support via tracheostomy   Neuro: GCS E1, VT1, M3, no focal deficits   CV: tachycardic, S1s2  Pulm: CTAB  Chest: neg accessory muscle recruitment, CTAB   abd: soft, non-tender   Ext: well perfused, cap refill < 2 sec  Skin: warm and dry, no mottling or rash      Lab/Radiology/Diagnostic Review:  Results for orders  placed or performed during the hospital encounter of 04/18/25 (from the past 24 hours)   CBC and Auto Differential   Result Value Ref Range    WBC 13.5 (H) 4.4 - 11.3 x10*3/uL    nRBC 0.4 (H) 0.0 - 0.0 /100 WBCs    RBC 2.66 (L) 4.00 - 5.20 x10*6/uL    Hemoglobin 7.3 (L) 12.0 - 16.0 g/dL    Hematocrit 24.6 (L) 36.0 - 46.0 %    MCV 93 80 - 100 fL    MCH 27.4 26.0 - 34.0 pg    MCHC 29.7 (L) 32.0 - 36.0 g/dL    RDW 16.2 (H) 11.5 - 14.5 %    Platelets 285 150 - 450 x10*3/uL    Neutrophils % 73.6 40.0 - 80.0 %    Immature Granulocytes %, Automated 4.4 (H) 0.0 - 0.9 %    Lymphocytes % 13.7 13.0 - 44.0 %    Monocytes % 6.5 2.0 - 10.0 %    Eosinophils % 1.4 0.0 - 6.0 %    Basophils % 0.4 0.0 - 2.0 %    Neutrophils Absolute 9.91 (H) 1.60 - 5.50 x10*3/uL    Immature Granulocytes Absolute, Automated 0.59 (H) 0.00 - 0.50 x10*3/uL    Lymphocytes Absolute 1.85 0.80 - 3.00 x10*3/uL    Monocytes Absolute 0.87 (H) 0.05 - 0.80 x10*3/uL    Eosinophils Absolute 0.19 0.00 - 0.40 x10*3/uL    Basophils Absolute 0.05 0.00 - 0.10 x10*3/uL   Comprehensive Metabolic Panel   Result Value Ref Range    Glucose 207 (H) 74 - 99 mg/dL    Sodium 128 (L) 136 - 145 mmol/L    Potassium 5.0 3.5 - 5.3 mmol/L    Chloride 93 (L) 98 - 107 mmol/L    Bicarbonate 31 21 - 32 mmol/L    Anion Gap 9 (L) 10 - 20 mmol/L    Urea Nitrogen 21 6 - 23 mg/dL    Creatinine 0.48 (L) 0.50 - 1.05 mg/dL    eGFR >90 >60 mL/min/1.73m*2    Calcium 9.2 8.6 - 10.3 mg/dL    Albumin 3.3 (L) 3.4 - 5.0 g/dL    Alkaline Phosphatase 214 (H) 33 - 136 U/L    Total Protein 6.6 6.4 - 8.2 g/dL    AST 63 (H) 9 - 39 U/L    Bilirubin, Total 0.2 0.0 - 1.2 mg/dL    ALT 74 (H) 7 - 45 U/L   Lactate   Result Value Ref Range    Lactate 0.2 (L) 0.4 - 2.0 mmol/L   Troponin I, High Sensitivity   Result Value Ref Range    Troponin I, High Sensitivity 11 0 - 13 ng/L   Blood Gas Venous Full Panel   Result Value Ref Range    POCT pH, Venous 7.08 (LL) 7.33 - 7.43 pH    POCT pCO2, Venous 111 (HH) 41 - 51 mm Hg     POCT pO2, Venous 108 (H) 35 - 45 mm Hg    POCT SO2, Venous 99 (H) 45 - 75 %    POCT Oxy Hemoglobin, Venous 96.1 (H) 45.0 - 75.0 %    POCT Hematocrit Calculated, Venous 23.0 (L) 36.0 - 46.0 %    POCT Sodium, Venous 128 (L) 136 - 145 mmol/L    POCT Potassium, Venous 5.3 3.5 - 5.3 mmol/L    POCT Chloride, Venous 92 (L) 98 - 107 mmol/L    POCT Ionized Calicum, Venous 1.39 (H) 1.10 - 1.33 mmol/L    POCT Glucose, Venous 214 (H) 74 - 99 mg/dL    POCT Lactate, Venous <0.3 (L) 0.4 - 2.0 mmol/L    POCT Base Excess, Venous 1.9 -2.0 - 3.0 mmol/L    POCT HCO3 Calculated, Venous 32.9 (H) 22.0 - 26.0 mmol/L    POCT Hemoglobin, Venous 7.7 (L) 12.0 - 16.0 g/dL    POCT Anion Gap, Venous 8.0 (L) 10.0 - 25.0 mmol/L    Patient Temperature 37.0 degrees Celsius    FiO2 100 %   MRSA Surveillance for Vancomycin De-escalation, PCR    Specimen: Anterior Nares; Swab   Result Value Ref Range    MRSA PCR Not Detected Not Detected   Sars-CoV-2, Influenza A/B and RSV PCR   Result Value Ref Range    Coronavirus 2019, PCR Not Detected Not Detected    Flu A Result Not Detected Not Detected    Flu B Result Not Detected Not Detected    RSV PCR Not Detected Not Detected   Blood Gas Arterial Full Panel   Result Value Ref Range    POCT pH, Arterial 7.10 (LL) 7.38 - 7.42 pH    POCT pCO2, Arterial 105 (HH) 38 - 42 mm Hg    POCT pO2, Arterial 333 (H) 85 - 95 mm Hg    POCT SO2, Arterial 100 94 - 100 %    POCT Oxy Hemoglobin, Arterial 97.5 94.0 - 98.0 %    POCT Hematocrit Calculated, Arterial 23.0 (L) 36.0 - 46.0 %    POCT Sodium, Arterial 125 (L) 136 - 145 mmol/L    POCT Potassium, Arterial 5.2 3.5 - 5.3 mmol/L    POCT Chloride, Arterial 93 (L) 98 - 107 mmol/L    POCT Ionized Calcium, Arterial 1.38 (H) 1.10 - 1.33 mmol/L    POCT Glucose, Arterial 222 (H) 74 - 99 mg/dL    POCT Lactate, Arterial <0.3 (L) 0.4 - 2.0 mmol/L    POCT Base Excess, Arterial 2.0 -2.0 - 3.0 mmol/L    POCT HCO3 Calculated, Arterial 32.6 (H) 22.0 - 26.0 mmol/L    POCT Hemoglobin,  Arterial 7.7 (L) 12.0 - 16.0 g/dL    POCT Anion Gap, Arterial 5 (L) 10 - 25 mmo/L    Patient Temperature 37.0 degrees Celsius    FiO2 80 %   Blood Gas Venous Full Panel   Result Value Ref Range    POCT pH, Venous 7.17 (LL) 7.33 - 7.43 pH    POCT pCO2, Venous 84 (HH) 41 - 51 mm Hg    POCT pO2, Venous 128 (H) 35 - 45 mm Hg    POCT SO2, Venous 99 (H) 45 - 75 %    POCT Oxy Hemoglobin, Venous 96.6 (H) 45.0 - 75.0 %    POCT Hematocrit Calculated, Venous 23.0 (L) 36.0 - 46.0 %    POCT Sodium, Venous 126 (L) 136 - 145 mmol/L    POCT Potassium, Venous 5.2 3.5 - 5.3 mmol/L    POCT Chloride, Venous 93 (L) 98 - 107 mmol/L    POCT Ionized Calicum, Venous 1.34 (H) 1.10 - 1.33 mmol/L    POCT Glucose, Venous 215 (H) 74 - 99 mg/dL    POCT Lactate, Venous <0.3 (L) 0.4 - 2.0 mmol/L    POCT Base Excess, Venous 1.4 -2.0 - 3.0 mmol/L    POCT HCO3 Calculated, Venous 30.6 (H) 22.0 - 26.0 mmol/L    POCT Hemoglobin, Venous 7.6 (L) 12.0 - 16.0 g/dL    POCT Anion Gap, Venous 8.0 (L) 10.0 - 25.0 mmol/L    Patient Temperature 37.0 degrees Celsius    FiO2 80 %   Blood Gas Venous Full Panel   Result Value Ref Range    POCT pH, Venous 7.06 (LL) 7.33 - 7.43 pH    POCT pCO2, Venous 109 (HH) 41 - 51 mm Hg    POCT pO2, Venous 64 (H) 35 - 45 mm Hg    POCT SO2, Venous 90 (H) 45 - 75 %    POCT Oxy Hemoglobin, Venous 87.8 (H) 45.0 - 75.0 %    POCT Hematocrit Calculated, Venous 23.0 (L) 36.0 - 46.0 %    POCT Sodium, Venous 128 (L) 136 - 145 mmol/L    POCT Potassium, Venous 5.3 3.5 - 5.3 mmol/L    POCT Chloride, Venous 93 (L) 98 - 107 mmol/L    POCT Ionized Calicum, Venous 1.38 (H) 1.10 - 1.33 mmol/L    POCT Glucose, Venous 217 (H) 74 - 99 mg/dL    POCT Lactate, Venous <0.3 (L) 0.4 - 2.0 mmol/L    POCT Base Excess, Venous -0.1 -2.0 - 3.0 mmol/L    POCT HCO3 Calculated, Venous 30.9 (H) 22.0 - 26.0 mmol/L    POCT Hemoglobin, Venous 7.5 (L) 12.0 - 16.0 g/dL    POCT Anion Gap, Venous 9.0 (L) 10.0 - 25.0 mmol/L    Patient Temperature 37.0 degrees Celsius    FiO2  80 %   BLOOD GAS VENOUS FULL PANEL   Result Value Ref Range    POCT pH, Venous 7.08 (LL) 7.33 - 7.43 pH    POCT pCO2, Venous 105 (HH) 41 - 51 mm Hg    POCT pO2, Venous 72 (H) 35 - 45 mm Hg    POCT SO2, Venous 95 (H) 45 - 75 %    POCT Oxy Hemoglobin, Venous 92.5 (H) 45.0 - 75.0 %    POCT Hematocrit Calculated, Venous 21.0 (L) 36.0 - 46.0 %    POCT Sodium, Venous 126 (L) 136 - 145 mmol/L    POCT Potassium, Venous 5.2 3.5 - 5.3 mmol/L    POCT Chloride, Venous 92 (L) 98 - 107 mmol/L    POCT Ionized Calicum, Venous 1.34 (H) 1.10 - 1.33 mmol/L    POCT Glucose, Venous 230 (H) 74 - 99 mg/dL    POCT Lactate, Venous <0.3 (L) 0.4 - 2.0 mmol/L    POCT Base Excess, Venous 0.5 -2.0 - 3.0 mmol/L    POCT HCO3 Calculated, Venous 31.1 (H) 22.0 - 26.0 mmol/L    POCT Hemoglobin, Venous 7.1 (L) 12.0 - 16.0 g/dL    POCT Anion Gap, Venous 8.0 (L) 10.0 - 25.0 mmol/L    Patient Temperature 37.0 degrees Celsius    FiO2 100 %     Imaging  XR chest 1 view  Result Date: 4/18/2025  1. Cardiomegaly and findings of pulmonary edema. 2. Nodular densities at the right lung base measuring 2.8 cm 1.5 cm there is nonspecific and could represent areas of round atelectasis or pulmonary nodules. Nonemergent chest CT recommended for further evaluation.     MACRO: None.   Signed by: Edu Daugherty 4/18/2025 2:36 PM Dictation workstation:   [id number]             [1]   Past Medical History:  Diagnosis Date    CHF (congestive heart failure)     COPD (chronic obstructive pulmonary disease) (Multi)     Pulmonary embolism     Stroke (Multi)    [2]   Past Surgical History:  Procedure Laterality Date    TRACHEOSTOMY TUBE PLACEMENT     [3] (Not in a hospital admission)  [4]   Social History  Tobacco Use    Smoking status: Unknown   [5] No family history on file.  [6] albuterol, 15 mg, nebulization, Once  azithromycin, 500 mg, intravenous, Once  lactated Ringer's, 1,500 mL, intravenous, Once  phenylephrine in NS, , ,   vancomycin, 1.25 g, intravenous, Once  [7]     [8] PRN medications: oxygen, phenylephrine in NS

## 2025-04-19 LAB
ALBUMIN SERPL BCP-MCNC: 3.1 G/DL (ref 3.4–5)
ALP SERPL-CCNC: 182 U/L (ref 33–136)
ALT SERPL W P-5'-P-CCNC: 56 U/L (ref 7–45)
ANION GAP SERPL CALC-SCNC: 10 MMOL/L (ref 10–20)
AST SERPL W P-5'-P-CCNC: 31 U/L (ref 9–39)
BASOPHILS # BLD AUTO: 0.02 X10*3/UL (ref 0–0.1)
BASOPHILS NFR BLD AUTO: 0.3 %
BILIRUB SERPL-MCNC: 0.3 MG/DL (ref 0–1.2)
BUN SERPL-MCNC: 21 MG/DL (ref 6–23)
CALCIUM SERPL-MCNC: 8.7 MG/DL (ref 8.6–10.3)
CHLORIDE SERPL-SCNC: 97 MMOL/L (ref 98–107)
CO2 SERPL-SCNC: 29 MMOL/L (ref 21–32)
CREAT SERPL-MCNC: 0.58 MG/DL (ref 0.5–1.05)
EGFRCR SERPLBLD CKD-EPI 2021: >90 ML/MIN/1.73M*2
EOSINOPHIL # BLD AUTO: 0.01 X10*3/UL (ref 0–0.4)
EOSINOPHIL NFR BLD AUTO: 0.1 %
ERYTHROCYTE [DISTWIDTH] IN BLOOD BY AUTOMATED COUNT: 16 % (ref 11.5–14.5)
GLUCOSE BLD MANUAL STRIP-MCNC: 126 MG/DL (ref 74–99)
GLUCOSE BLD MANUAL STRIP-MCNC: 151 MG/DL (ref 74–99)
GLUCOSE BLD MANUAL STRIP-MCNC: 163 MG/DL (ref 74–99)
GLUCOSE BLD MANUAL STRIP-MCNC: 193 MG/DL (ref 74–99)
GLUCOSE BLD MANUAL STRIP-MCNC: 251 MG/DL (ref 74–99)
GLUCOSE BLD MANUAL STRIP-MCNC: 73 MG/DL (ref 74–99)
GLUCOSE SERPL-MCNC: 147 MG/DL (ref 74–99)
HCT VFR BLD AUTO: 22.6 % (ref 36–46)
HGB BLD-MCNC: 7 G/DL (ref 12–16)
IMM GRANULOCYTES # BLD AUTO: 0.14 X10*3/UL (ref 0–0.5)
IMM GRANULOCYTES NFR BLD AUTO: 1.8 % (ref 0–0.9)
LYMPHOCYTES # BLD AUTO: 0.99 X10*3/UL (ref 0.8–3)
LYMPHOCYTES NFR BLD AUTO: 13.1 %
MCH RBC QN AUTO: 27.7 PG (ref 26–34)
MCHC RBC AUTO-ENTMCNC: 31 G/DL (ref 32–36)
MCV RBC AUTO: 89 FL (ref 80–100)
MONOCYTES # BLD AUTO: 0.32 X10*3/UL (ref 0.05–0.8)
MONOCYTES NFR BLD AUTO: 4.2 %
NEUTROPHILS # BLD AUTO: 6.1 X10*3/UL (ref 1.6–5.5)
NEUTROPHILS NFR BLD AUTO: 80.5 %
NRBC BLD-RTO: 0 /100 WBCS (ref 0–0)
PLATELET # BLD AUTO: 225 X10*3/UL (ref 150–450)
POTASSIUM SERPL-SCNC: 3.9 MMOL/L (ref 3.5–5.3)
PROT SERPL-MCNC: 5.8 G/DL (ref 6.4–8.2)
RBC # BLD AUTO: 2.53 X10*6/UL (ref 4–5.2)
SODIUM SERPL-SCNC: 132 MMOL/L (ref 136–145)
WBC # BLD AUTO: 7.6 X10*3/UL (ref 4.4–11.3)

## 2025-04-19 PROCEDURE — 2500000002 HC RX 250 W HCPCS SELF ADMINISTERED DRUGS (ALT 637 FOR MEDICARE OP, ALT 636 FOR OP/ED)

## 2025-04-19 PROCEDURE — 36415 COLL VENOUS BLD VENIPUNCTURE: CPT | Performed by: INTERNAL MEDICINE

## 2025-04-19 PROCEDURE — 2500000004 HC RX 250 GENERAL PHARMACY W/ HCPCS (ALT 636 FOR OP/ED): Mod: JZ | Performed by: ANESTHESIOLOGY

## 2025-04-19 PROCEDURE — 2500000005 HC RX 250 GENERAL PHARMACY W/O HCPCS: Performed by: INTERNAL MEDICINE

## 2025-04-19 PROCEDURE — 2500000001 HC RX 250 WO HCPCS SELF ADMINISTERED DRUGS (ALT 637 FOR MEDICARE OP): Performed by: INTERNAL MEDICINE

## 2025-04-19 PROCEDURE — 2500000001 HC RX 250 WO HCPCS SELF ADMINISTERED DRUGS (ALT 637 FOR MEDICARE OP): Performed by: ANESTHESIOLOGY

## 2025-04-19 PROCEDURE — 2500000004 HC RX 250 GENERAL PHARMACY W/ HCPCS (ALT 636 FOR OP/ED): Mod: JZ

## 2025-04-19 PROCEDURE — 2500000004 HC RX 250 GENERAL PHARMACY W/ HCPCS (ALT 636 FOR OP/ED): Mod: JZ | Performed by: INTERNAL MEDICINE

## 2025-04-19 PROCEDURE — 2020000001 HC ICU ROOM DAILY

## 2025-04-19 PROCEDURE — 2500000002 HC RX 250 W HCPCS SELF ADMINISTERED DRUGS (ALT 637 FOR MEDICARE OP, ALT 636 FOR OP/ED): Performed by: ANESTHESIOLOGY

## 2025-04-19 PROCEDURE — 2500000002 HC RX 250 W HCPCS SELF ADMINISTERED DRUGS (ALT 637 FOR MEDICARE OP, ALT 636 FOR OP/ED): Performed by: INTERNAL MEDICINE

## 2025-04-19 PROCEDURE — 94003 VENT MGMT INPAT SUBQ DAY: CPT

## 2025-04-19 PROCEDURE — 85025 COMPLETE CBC W/AUTO DIFF WBC: CPT | Performed by: INTERNAL MEDICINE

## 2025-04-19 PROCEDURE — 94664 DEMO&/EVAL PT USE INHALER: CPT

## 2025-04-19 PROCEDURE — 84075 ASSAY ALKALINE PHOSPHATASE: CPT | Performed by: INTERNAL MEDICINE

## 2025-04-19 PROCEDURE — 2500000001 HC RX 250 WO HCPCS SELF ADMINISTERED DRUGS (ALT 637 FOR MEDICARE OP)

## 2025-04-19 PROCEDURE — 94640 AIRWAY INHALATION TREATMENT: CPT

## 2025-04-19 PROCEDURE — 94681 O2 UPTK CO2 OUTP % O2 XTRC: CPT

## 2025-04-19 PROCEDURE — 82947 ASSAY GLUCOSE BLOOD QUANT: CPT

## 2025-04-19 PROCEDURE — 99291 CRITICAL CARE FIRST HOUR: CPT | Performed by: ANESTHESIOLOGY

## 2025-04-19 PROCEDURE — 2500000005 HC RX 250 GENERAL PHARMACY W/O HCPCS: Performed by: ANESTHESIOLOGY

## 2025-04-19 RX ORDER — BUDESONIDE 0.5 MG/2ML
0.5 INHALANT ORAL
Status: DISCONTINUED | OUTPATIENT
Start: 2025-04-19 | End: 2025-04-25 | Stop reason: HOSPADM

## 2025-04-19 RX ORDER — ESOMEPRAZOLE MAGNESIUM 40 MG/1
20 GRANULE, DELAYED RELEASE ORAL
Status: DISCONTINUED | OUTPATIENT
Start: 2025-04-20 | End: 2025-04-25 | Stop reason: HOSPADM

## 2025-04-19 RX ORDER — HYDRALAZINE HYDROCHLORIDE 20 MG/ML
INJECTION INTRAMUSCULAR; INTRAVENOUS
Status: COMPLETED
Start: 2025-04-19 | End: 2025-04-19

## 2025-04-19 RX ORDER — INSULIN LISPRO 100 [IU]/ML
INJECTION, SOLUTION INTRAVENOUS; SUBCUTANEOUS
COMMUNITY
Start: 2025-02-03 | End: 2025-04-25 | Stop reason: HOSPADM

## 2025-04-19 RX ORDER — CITALOPRAM 20 MG/1
10 TABLET, FILM COATED ORAL DAILY
Status: DISCONTINUED | OUTPATIENT
Start: 2025-04-19 | End: 2025-04-25 | Stop reason: HOSPADM

## 2025-04-19 RX ORDER — TALC
6 POWDER (GRAM) TOPICAL NIGHTLY
Status: DISCONTINUED | OUTPATIENT
Start: 2025-04-19 | End: 2025-04-25 | Stop reason: HOSPADM

## 2025-04-19 RX ORDER — NAPROXEN SODIUM 220 MG/1
81 TABLET, FILM COATED ORAL DAILY
Status: DISCONTINUED | OUTPATIENT
Start: 2025-04-19 | End: 2025-04-25 | Stop reason: HOSPADM

## 2025-04-19 RX ORDER — LOSARTAN POTASSIUM 50 MG/1
100 TABLET ORAL DAILY
Status: DISCONTINUED | OUTPATIENT
Start: 2025-04-19 | End: 2025-04-25 | Stop reason: HOSPADM

## 2025-04-19 RX ORDER — BUDESONIDE, GLYCOPYRROLATE, AND FORMOTEROL FUMARATE 160; 9; 4.8 UG/1; UG/1; UG/1
AEROSOL, METERED RESPIRATORY (INHALATION)
Status: ON HOLD | COMMUNITY
Start: 2025-04-16 | End: 2025-04-22 | Stop reason: ENTERED-IN-ERROR

## 2025-04-19 RX ORDER — HYDRALAZINE HYDROCHLORIDE 20 MG/ML
10 INJECTION INTRAMUSCULAR; INTRAVENOUS EVERY 4 HOURS PRN
Status: DISCONTINUED | OUTPATIENT
Start: 2025-04-19 | End: 2025-04-25 | Stop reason: HOSPADM

## 2025-04-19 RX ORDER — QUETIAPINE FUMARATE 50 MG/1
50 TABLET, FILM COATED ORAL NIGHTLY
Status: DISCONTINUED | OUTPATIENT
Start: 2025-04-19 | End: 2025-04-19

## 2025-04-19 RX ORDER — FUROSEMIDE 10 MG/ML
40 INJECTION INTRAMUSCULAR; INTRAVENOUS ONCE
Status: COMPLETED | OUTPATIENT
Start: 2025-04-19 | End: 2025-04-19

## 2025-04-19 RX ADMIN — INSULIN LISPRO 6 UNITS: 100 INJECTION, SOLUTION INTRAVENOUS; SUBCUTANEOUS at 00:03

## 2025-04-19 RX ADMIN — HYDRALAZINE HYDROCHLORIDE 10 MG: 20 INJECTION INTRAMUSCULAR; INTRAVENOUS at 04:22

## 2025-04-19 RX ADMIN — QUETIAPINE FUMARATE 50 MG: 50 TABLET ORAL at 02:33

## 2025-04-19 RX ADMIN — SODIUM CHLORIDE 2 G: 1 TABLET ORAL at 14:30

## 2025-04-19 RX ADMIN — CITALOPRAM HYDROBROMIDE 10 MG: 20 TABLET ORAL at 14:30

## 2025-04-19 RX ADMIN — PIPERACILLIN SODIUM AND TAZOBACTAM SODIUM 3.38 G: 3; .375 INJECTION, SOLUTION INTRAVENOUS at 14:30

## 2025-04-19 RX ADMIN — IPRATROPIUM BROMIDE AND ALBUTEROL SULFATE 3 ML: 2.5; .5 SOLUTION RESPIRATORY (INHALATION) at 11:32

## 2025-04-19 RX ADMIN — LOSARTAN POTASSIUM 100 MG: 50 TABLET, FILM COATED ORAL at 14:38

## 2025-04-19 RX ADMIN — INSULIN LISPRO 4 UNITS: 100 INJECTION, SOLUTION INTRAVENOUS; SUBCUTANEOUS at 04:39

## 2025-04-19 RX ADMIN — CARVEDILOL 25 MG: 25 TABLET, FILM COATED ORAL at 21:17

## 2025-04-19 RX ADMIN — BUDESONIDE 0.5 MG: 0.5 INHALANT RESPIRATORY (INHALATION) at 19:21

## 2025-04-19 RX ADMIN — IPRATROPIUM BROMIDE AND ALBUTEROL SULFATE 3 ML: 2.5; .5 SOLUTION RESPIRATORY (INHALATION) at 23:39

## 2025-04-19 RX ADMIN — APIXABAN 5 MG: 5 TABLET, FILM COATED ORAL at 08:05

## 2025-04-19 RX ADMIN — METHYLPREDNISOLONE SODIUM SUCCINATE 40 MG: 40 INJECTION, POWDER, FOR SOLUTION INTRAMUSCULAR; INTRAVENOUS at 08:04

## 2025-04-19 RX ADMIN — IPRATROPIUM BROMIDE AND ALBUTEROL SULFATE 3 ML: 2.5; .5 SOLUTION RESPIRATORY (INHALATION) at 07:18

## 2025-04-19 RX ADMIN — FAMOTIDINE 20 MG: 20 TABLET, FILM COATED ORAL at 08:06

## 2025-04-19 RX ADMIN — ASPIRIN 81 MG CHEWABLE TABLET 81 MG: 81 TABLET CHEWABLE at 14:30

## 2025-04-19 RX ADMIN — Medication 30 PERCENT: at 19:21

## 2025-04-19 RX ADMIN — HYDRALAZINE HYDROCHLORIDE: 20 INJECTION, SOLUTION INTRAMUSCULAR; INTRAVENOUS at 00:59

## 2025-04-19 RX ADMIN — FAMOTIDINE 20 MG: 10 INJECTION, SOLUTION INTRAVENOUS at 21:17

## 2025-04-19 RX ADMIN — IPRATROPIUM BROMIDE AND ALBUTEROL SULFATE 3 ML: 2.5; .5 SOLUTION RESPIRATORY (INHALATION) at 02:16

## 2025-04-19 RX ADMIN — CARVEDILOL 25 MG: 25 TABLET, FILM COATED ORAL at 14:30

## 2025-04-19 RX ADMIN — SODIUM CHLORIDE 2 G: 1 TABLET ORAL at 08:05

## 2025-04-19 RX ADMIN — PIPERACILLIN SODIUM AND TAZOBACTAM SODIUM 3.38 G: 3; .375 INJECTION, SOLUTION INTRAVENOUS at 21:51

## 2025-04-19 RX ADMIN — PIPERACILLIN SODIUM AND TAZOBACTAM SODIUM 3.38 G: 3; .375 INJECTION, SOLUTION INTRAVENOUS at 08:14

## 2025-04-19 RX ADMIN — SODIUM CHLORIDE 2 G: 1 TABLET ORAL at 21:32

## 2025-04-19 RX ADMIN — Medication 30 PERCENT: at 02:16

## 2025-04-19 RX ADMIN — FUROSEMIDE 40 MG: 10 INJECTION, SOLUTION INTRAMUSCULAR; INTRAVENOUS at 16:43

## 2025-04-19 RX ADMIN — INSULIN LISPRO 2 UNITS: 100 INJECTION, SOLUTION INTRAVENOUS; SUBCUTANEOUS at 21:21

## 2025-04-19 RX ADMIN — Medication 30 PERCENT: at 07:18

## 2025-04-19 RX ADMIN — HYDRALAZINE HYDROCHLORIDE 10 MG: 20 INJECTION INTRAMUSCULAR; INTRAVENOUS at 11:10

## 2025-04-19 RX ADMIN — INSULIN LISPRO 2 UNITS: 100 INJECTION, SOLUTION INTRAVENOUS; SUBCUTANEOUS at 16:53

## 2025-04-19 RX ADMIN — HYDRALAZINE HYDROCHLORIDE 10 MG: 20 INJECTION INTRAMUSCULAR; INTRAVENOUS at 00:52

## 2025-04-19 RX ADMIN — ATORVASTATIN CALCIUM 40 MG: 40 TABLET, FILM COATED ORAL at 21:17

## 2025-04-19 RX ADMIN — Medication 6 MG: at 21:18

## 2025-04-19 RX ADMIN — IPRATROPIUM BROMIDE AND ALBUTEROL SULFATE 3 ML: 2.5; .5 SOLUTION RESPIRATORY (INHALATION) at 14:54

## 2025-04-19 RX ADMIN — IPRATROPIUM BROMIDE AND ALBUTEROL SULFATE 3 ML: 2.5; .5 SOLUTION RESPIRATORY (INHALATION) at 19:21

## 2025-04-19 RX ADMIN — PIPERACILLIN SODIUM AND TAZOBACTAM SODIUM 3.38 G: 3; .375 INJECTION, SOLUTION INTRAVENOUS at 02:33

## 2025-04-19 RX ADMIN — APIXABAN 5 MG: 5 TABLET, FILM COATED ORAL at 21:18

## 2025-04-19 RX ADMIN — Medication 1 APPLICATION: at 09:35

## 2025-04-19 ASSESSMENT — PAIN SCALES - GENERAL
PAINLEVEL_OUTOF10: 0 - NO PAIN
PAINLEVEL_OUTOF10: 0 - NO PAIN

## 2025-04-19 ASSESSMENT — PAIN - FUNCTIONAL ASSESSMENT
PAIN_FUNCTIONAL_ASSESSMENT: CPOT (CRITICAL CARE PAIN OBSERVATION TOOL)
PAIN_FUNCTIONAL_ASSESSMENT: CPOT (CRITICAL CARE PAIN OBSERVATION TOOL)
PAIN_FUNCTIONAL_ASSESSMENT: 0-10
PAIN_FUNCTIONAL_ASSESSMENT: 0-10

## 2025-04-19 NOTE — ACP (ADVANCE CARE PLANNING)
Confirming Previous Code Status: Yes    Code status confirmed with daughter Yazmin at bedside and son/POA via Facetime. They confirm that code status is DNR, patient currently has trach and is ok to require mechanical ventilation if needed. DNR order placed, bedside RN made aware.

## 2025-04-19 NOTE — PROGRESS NOTES
Critical Care Medicine Progress Note    Admitted on:     4/18/2025  Length of Stay: 1 day(s)     Interval History     73 yo F with PMH of HTN, HLD, HFpEF, COPD s/p trach & PEG, chronic PE on Eliquis, T2DM on insulin presented to the ED from SNF for decline in mental status and hypoxemia.  Per documentation, she's typically AAO x 1-2.  Found disoriented and hypoxemic (SpO2 70% on room air).  Labs notable for Na 128, BNP 1702, WBC 13.5.  ABG 7.10/105/333 on FiO2 80%.  CXR with findings c/w pulmonary edema.  Treated with antibiotics, put on vent support, and admitted to the ICU.    Patient was admitted to [facility] on 2/14 for COPD exacerbation and ADHF.  Hospital course c/b laryngeal injury and stridor requirement trach and debridement of granulation tissue.  Transferred to [facility] ([facility]) on or about 3/7.  PEG tube was inserted during her stay there and she was weaned to trach collar.      No acute events overnight.  Awake and alert on exam.  Follows commands.  Tries to communicate by mouthing words but I don't understand what she's trying to say.  Per RT, she came in with a fenestrated cuffless trach.  In the ED, this was changed to a #7.5 cuffed Shiley to facilitate mechanical ventilation.    Objective   Objective     Vitals:    04/18/25 1345   Weight: 52.2 kg (115 lb 1.3 oz)   Body mass index is 18.57 kg/m².        4/19/2025     7:18 AM 4/19/2025     7:30 AM 4/19/2025     8:00 AM 4/19/2025     9:00 AM 4/19/2025    10:00 AM 4/19/2025    11:00 AM 4/19/2025    11:10 AM   Vitals   Systolic  155 162 177 189 199 199   Diastolic  68 68 71 96 87 87   BP Location  Left arm        Heart Rate 83 80 77 78 80 83    Temp  36.9 °C (98.4 °F)        Resp 18 22 18 18 18 26         Vent settings:  Vent Mode: Assist control/Volume control plus  FiO2 (%):  [30 %-80 %] 30 %  S RR:  [12-18] 18  S VT:  [400 mL] 400 mL  PEEP/CPAP (cm H2O):  [5 cm H20] 5 cm H20  MAP (cm H2O):  [9.1-15] 11    Intake/Output Summary (Last 24 hours) at  4/19/2025 1130  Last data filed at 4/19/2025 0845  Gross per 24 hour   Intake 908.33 ml   Output 3 ml   Net 905.33 ml       Physical Exam  Neuro: Moves all extremities.  Eyes: PERRL, clear sclerae.  CV: Normal S1, S2.  RRR.  No m/r/g.  Resp: Diminished but clear on auscultation.  GI: +PEG tube.  +BS, abd soft, NT, ND.  Ext: No peripheral edema.  Skin: Warm and dry.  No rashes or lesions.    Medications     Scheduled Medications:   Scheduled Medications[1]   Continuous Medications:   Continuous Medications[2]   PRN Medications:     Labs     Results from last 72 hours   Lab Units 04/19/25  0538 04/18/25  1421   GLUCOSE mg/dL 147* 207*   SODIUM mmol/L 132* 128*   POTASSIUM mmol/L 3.9 5.0   CHLORIDE mmol/L 97* 93*   CO2 mmol/L 29 31   BUN mg/dL 21 21   CREATININE mg/dL 0.58 0.48*   EGFR mL/min/1.73m*2 >90 >90   CALCIUM mg/dL 8.7 9.2   ALBUMIN g/dL 3.1* 3.3*   MAGNESIUM mg/dL  --  1.82   PHOSPHORUS mg/dL  --  4.5     Results from last 72 hours   Lab Units 04/19/25  0538 04/18/25  1421   ALK PHOS U/L 182* 214*   ALT U/L 56* 74*   AST U/L 31 63*   BILIRUBIN TOTAL mg/dL 0.3 0.2   PROTEIN TOTAL g/dL 5.8* 6.6     Results from last 72 hours   Lab Units 04/18/25  1421   TROPHS ng/L 11     Results from last 72 hours   Lab Units 04/18/25  1421   LACTATE mmol/L 0.2*     Results from last 72 hours   Lab Units 04/19/25  0538 04/18/25  1421   WBC AUTO x10*3/uL 7.6 13.5*   NRBC AUTO /100 WBCs 0.0 0.4*   RBC AUTO x10*6/uL 2.53* 2.66*   HEMOGLOBIN g/dL 7.0* 7.3*   HEMATOCRIT % 22.6* 24.6*   MCV fL 89 93   MCH pg 27.7 27.4   MCHC g/dL 31.0* 29.7*   RDW % 16.0* 16.2*   PLATELETS AUTO x10*3/uL 225 285     Results from last 72 hours   Lab Units 04/18/25  1923 04/18/25  1744 04/18/25  1625 04/18/25  1511 04/18/25  1509   POCT PH, ARTERIAL pH 7.30*  --   --   --  7.10*   POCT PCO2, ARTERIAL mm Hg 61*  --   --   --  105*   POCT PO2, ARTERIAL mm Hg 172*  --   --   --  333*   POCT HCO3 CALCULATED, ARTERIAL mmol/L 30.0*  --   --   --  32.6*  "  POCT LACTATE, ARTERIAL mmol/L 0.5  --   --   --  <0.3*   POCT BASE EXCESS, ARTERIAL mmol/L 3.1*  --   --   --  2.0   FIO2 % 40 65 100   < > 80    < > = values in this interval not displayed.     Lab Results   Component Value Date    BLOODCULT Loaded on Instrument - Culture in progress 04/18/2025    BLOODCULT Loaded on Instrument - Culture in progress 04/18/2025     No results found for: \"URINECULTURE\"    Imaging and Diagnostic Studies     Recent imaging and diagnostic studies reviewed.       Assessment / Plan     #Acute hypercapnic encephalopathy (CO2 narcosis)  #Acute on chronic hypercapnic respiratory failure  - Likely secondary to COPD and ADHF.  Mentation improving with mechanical vent support so she will be maintained on it for today.  Continue steroids and nebulized bronchodilators.  - Lower suspicion for an infectious etiology but will continue antibiotics for now.  Check procal.  - Start tube feeds.    #Acute on chronic diastolic CHF  #Hypertension  - Gentle diuresis.  - Resume home antihypertensives.    #Chronic hyponatremia  - Continue home salt tabs.      Eladio Lerma MD    This patient is critically ill/injured due to acute impairment in one or more vital organ systems, such that there is a probably of imminent or life-threatening deterioration of the patient's condition.  I spent 50 minutes in the direct delivery of medical care that involves high complexity decision making to treat single or multiple vital organ system failure and/or prevent further life-threatening deterioration of the patient's condition.  This time does not include separately billable procedures.         [1] ammonium lactate, 1 Application, Topical, Daily  apixaban, 5 mg, g-tube, BID  atorvastatin, 40 mg, g-tube, Nightly  [Held by provider] carvedilol, 25 mg, g-tube, BID  famotidine, 20 mg, oral, BID   Or  famotidine, 20 mg, intravenous, BID  insulin lispro, 0-10 Units, subcutaneous, q4h  ipratropium-albuteroL, 3 mL, " nebulization, q4h  methylPREDNISolone sodium succinate (PF), 40 mg, intravenous, Daily  oxygen, , inhalation, Continuous - Inhalation  piperacillin-tazobactam, 3.375 g, intravenous, q6h  QUEtiapine, 50 mg, oral, Nightly  sodium chloride, 2 g, oral, TID  [2]

## 2025-04-19 NOTE — CARE PLAN
The patient's goals for the shift include  larisa    The clinical goals for the shift include to remain HDS

## 2025-04-19 NOTE — CARE PLAN
The patient's goals for the shift include      The clinical goals for the shift include to remain HDS

## 2025-04-20 LAB
ALBUMIN SERPL BCP-MCNC: 2.9 G/DL (ref 3.4–5)
ALP SERPL-CCNC: 141 U/L (ref 33–136)
ALT SERPL W P-5'-P-CCNC: 44 U/L (ref 7–45)
ANION GAP SERPL CALC-SCNC: 9 MMOL/L (ref 10–20)
AST SERPL W P-5'-P-CCNC: 21 U/L (ref 9–39)
BACTERIA BLD CULT: NORMAL
BACTERIA BLD CULT: NORMAL
BASOPHILS # BLD AUTO: 0.04 X10*3/UL (ref 0–0.1)
BASOPHILS NFR BLD AUTO: 0.6 %
BILIRUB SERPL-MCNC: 0.3 MG/DL (ref 0–1.2)
BUN SERPL-MCNC: 26 MG/DL (ref 6–23)
CALCIUM SERPL-MCNC: 8.9 MG/DL (ref 8.6–10.3)
CHLORIDE SERPL-SCNC: 97 MMOL/L (ref 98–107)
CO2 SERPL-SCNC: 33 MMOL/L (ref 21–32)
CREAT SERPL-MCNC: 0.71 MG/DL (ref 0.5–1.05)
EGFRCR SERPLBLD CKD-EPI 2021: 90 ML/MIN/1.73M*2
EOSINOPHIL # BLD AUTO: 0.07 X10*3/UL (ref 0–0.4)
EOSINOPHIL NFR BLD AUTO: 1.1 %
ERYTHROCYTE [DISTWIDTH] IN BLOOD BY AUTOMATED COUNT: 16.9 % (ref 11.5–14.5)
GLUCOSE BLD MANUAL STRIP-MCNC: 123 MG/DL (ref 74–99)
GLUCOSE BLD MANUAL STRIP-MCNC: 147 MG/DL (ref 74–99)
GLUCOSE BLD MANUAL STRIP-MCNC: 191 MG/DL (ref 74–99)
GLUCOSE BLD MANUAL STRIP-MCNC: 200 MG/DL (ref 74–99)
GLUCOSE BLD MANUAL STRIP-MCNC: 227 MG/DL (ref 74–99)
GLUCOSE BLD MANUAL STRIP-MCNC: 231 MG/DL (ref 74–99)
GLUCOSE SERPL-MCNC: 180 MG/DL (ref 74–99)
HCT VFR BLD AUTO: 22.1 % (ref 36–46)
HEMOCCULT SP1 STL QL: NEGATIVE
HGB BLD-MCNC: 7 G/DL (ref 12–16)
IMM GRANULOCYTES # BLD AUTO: 0.04 X10*3/UL (ref 0–0.5)
IMM GRANULOCYTES NFR BLD AUTO: 0.6 % (ref 0–0.9)
LYMPHOCYTES # BLD AUTO: 0.77 X10*3/UL (ref 0.8–3)
LYMPHOCYTES NFR BLD AUTO: 11.8 %
MAGNESIUM SERPL-MCNC: 1.94 MG/DL (ref 1.6–2.4)
MCH RBC QN AUTO: 28.1 PG (ref 26–34)
MCHC RBC AUTO-ENTMCNC: 31.7 G/DL (ref 32–36)
MCV RBC AUTO: 89 FL (ref 80–100)
MONOCYTES # BLD AUTO: 0.65 X10*3/UL (ref 0.05–0.8)
MONOCYTES NFR BLD AUTO: 10 %
NEUTROPHILS # BLD AUTO: 4.96 X10*3/UL (ref 1.6–5.5)
NEUTROPHILS NFR BLD AUTO: 75.9 %
NRBC BLD-RTO: 0.3 /100 WBCS (ref 0–0)
PHOSPHATE SERPL-MCNC: 1.6 MG/DL (ref 2.5–4.9)
PLATELET # BLD AUTO: 229 X10*3/UL (ref 150–450)
POTASSIUM SERPL-SCNC: 3.1 MMOL/L (ref 3.5–5.3)
PROCALCITONIN SERPL-MCNC: 0.14 NG/ML
PROT SERPL-MCNC: 5.8 G/DL (ref 6.4–8.2)
RBC # BLD AUTO: 2.49 X10*6/UL (ref 4–5.2)
SODIUM SERPL-SCNC: 136 MMOL/L (ref 136–145)
WBC # BLD AUTO: 6.5 X10*3/UL (ref 4.4–11.3)

## 2025-04-20 PROCEDURE — 94640 AIRWAY INHALATION TREATMENT: CPT

## 2025-04-20 PROCEDURE — 2500000001 HC RX 250 WO HCPCS SELF ADMINISTERED DRUGS (ALT 637 FOR MEDICARE OP)

## 2025-04-20 PROCEDURE — 84450 TRANSFERASE (AST) (SGOT): CPT | Performed by: INTERNAL MEDICINE

## 2025-04-20 PROCEDURE — 99291 CRITICAL CARE FIRST HOUR: CPT | Performed by: ANESTHESIOLOGY

## 2025-04-20 PROCEDURE — 82270 OCCULT BLOOD FECES: CPT | Performed by: ANESTHESIOLOGY

## 2025-04-20 PROCEDURE — 2500000005 HC RX 250 GENERAL PHARMACY W/O HCPCS: Performed by: INTERNAL MEDICINE

## 2025-04-20 PROCEDURE — 2500000001 HC RX 250 WO HCPCS SELF ADMINISTERED DRUGS (ALT 637 FOR MEDICARE OP): Performed by: ANESTHESIOLOGY

## 2025-04-20 PROCEDURE — 2500000005 HC RX 250 GENERAL PHARMACY W/O HCPCS: Performed by: ANESTHESIOLOGY

## 2025-04-20 PROCEDURE — 85025 COMPLETE CBC W/AUTO DIFF WBC: CPT | Performed by: INTERNAL MEDICINE

## 2025-04-20 PROCEDURE — 2500000004 HC RX 250 GENERAL PHARMACY W/ HCPCS (ALT 636 FOR OP/ED): Mod: JZ | Performed by: ANESTHESIOLOGY

## 2025-04-20 PROCEDURE — 82947 ASSAY GLUCOSE BLOOD QUANT: CPT

## 2025-04-20 PROCEDURE — 94664 DEMO&/EVAL PT USE INHALER: CPT

## 2025-04-20 PROCEDURE — 2500000002 HC RX 250 W HCPCS SELF ADMINISTERED DRUGS (ALT 637 FOR MEDICARE OP, ALT 636 FOR OP/ED): Performed by: INTERNAL MEDICINE

## 2025-04-20 PROCEDURE — 83735 ASSAY OF MAGNESIUM: CPT | Performed by: ANESTHESIOLOGY

## 2025-04-20 PROCEDURE — 2500000004 HC RX 250 GENERAL PHARMACY W/ HCPCS (ALT 636 FOR OP/ED): Mod: JZ | Performed by: INTERNAL MEDICINE

## 2025-04-20 PROCEDURE — 2500000004 HC RX 250 GENERAL PHARMACY W/ HCPCS (ALT 636 FOR OP/ED): Mod: JZ

## 2025-04-20 PROCEDURE — 2500000002 HC RX 250 W HCPCS SELF ADMINISTERED DRUGS (ALT 637 FOR MEDICARE OP, ALT 636 FOR OP/ED): Performed by: ANESTHESIOLOGY

## 2025-04-20 PROCEDURE — 84145 PROCALCITONIN (PCT): CPT | Mod: AHULAB | Performed by: ANESTHESIOLOGY

## 2025-04-20 PROCEDURE — 2500000002 HC RX 250 W HCPCS SELF ADMINISTERED DRUGS (ALT 637 FOR MEDICARE OP, ALT 636 FOR OP/ED)

## 2025-04-20 PROCEDURE — 84100 ASSAY OF PHOSPHORUS: CPT | Performed by: ANESTHESIOLOGY

## 2025-04-20 PROCEDURE — 94003 VENT MGMT INPAT SUBQ DAY: CPT

## 2025-04-20 PROCEDURE — 2020000001 HC ICU ROOM DAILY

## 2025-04-20 PROCEDURE — 94681 O2 UPTK CO2 OUTP % O2 XTRC: CPT

## 2025-04-20 PROCEDURE — 80053 COMPREHEN METABOLIC PANEL: CPT | Performed by: INTERNAL MEDICINE

## 2025-04-20 RX ORDER — FUROSEMIDE 10 MG/ML
20 INJECTION INTRAMUSCULAR; INTRAVENOUS ONCE
Status: COMPLETED | OUTPATIENT
Start: 2025-04-20 | End: 2025-04-20

## 2025-04-20 RX ORDER — SODIUM,POTASSIUM PHOSPHATES 280-250MG
1 POWDER IN PACKET (EA) ORAL 4 TIMES DAILY
Status: COMPLETED | OUTPATIENT
Start: 2025-04-20 | End: 2025-04-20

## 2025-04-20 RX ORDER — FLUTICASONE FUROATE AND VILANTEROL 100; 25 UG/1; UG/1
1 POWDER RESPIRATORY (INHALATION)
Status: DISCONTINUED | OUTPATIENT
Start: 2025-04-20 | End: 2025-04-20

## 2025-04-20 RX ORDER — ACETAMINOPHEN 160 MG/5ML
975 SOLUTION ORAL EVERY 6 HOURS PRN
Status: DISCONTINUED | OUTPATIENT
Start: 2025-04-20 | End: 2025-04-20

## 2025-04-20 RX ORDER — SODIUM,POTASSIUM PHOSPHATES 280-250MG
1 POWDER IN PACKET (EA) ORAL 4 TIMES DAILY
Status: DISCONTINUED | OUTPATIENT
Start: 2025-04-20 | End: 2025-04-20

## 2025-04-20 RX ORDER — ACETAMINOPHEN 160 MG/5ML
975 SOLUTION ORAL EVERY 6 HOURS PRN
Status: DISCONTINUED | OUTPATIENT
Start: 2025-04-20 | End: 2025-04-25 | Stop reason: HOSPADM

## 2025-04-20 RX ORDER — OXYCODONE HCL 5 MG/5 ML
2.5 SOLUTION, ORAL ORAL EVERY 8 HOURS PRN
Refills: 0 | Status: DISCONTINUED | OUTPATIENT
Start: 2025-04-20 | End: 2025-04-25 | Stop reason: HOSPADM

## 2025-04-20 RX ORDER — INSULIN LISPRO 100 [IU]/ML
0-10 INJECTION, SOLUTION INTRAVENOUS; SUBCUTANEOUS EVERY 6 HOURS
Status: DISCONTINUED | OUTPATIENT
Start: 2025-04-20 | End: 2025-04-25 | Stop reason: HOSPADM

## 2025-04-20 RX ORDER — POTASSIUM CHLORIDE 1.5 G/1.58G
40 POWDER, FOR SOLUTION ORAL ONCE
Status: COMPLETED | OUTPATIENT
Start: 2025-04-20 | End: 2025-04-20

## 2025-04-20 RX ORDER — FUROSEMIDE 10 MG/ML
40 INJECTION INTRAMUSCULAR; INTRAVENOUS ONCE
Status: DISCONTINUED | OUTPATIENT
Start: 2025-04-20 | End: 2025-04-20

## 2025-04-20 RX ORDER — FUROSEMIDE 10 MG/ML
40 INJECTION INTRAMUSCULAR; INTRAVENOUS ONCE
Status: COMPLETED | OUTPATIENT
Start: 2025-04-20 | End: 2025-04-20

## 2025-04-20 RX ADMIN — PIPERACILLIN SODIUM AND TAZOBACTAM SODIUM 3.38 G: 3; .375 INJECTION, SOLUTION INTRAVENOUS at 08:10

## 2025-04-20 RX ADMIN — IPRATROPIUM BROMIDE AND ALBUTEROL SULFATE 3 ML: 2.5; .5 SOLUTION RESPIRATORY (INHALATION) at 07:28

## 2025-04-20 RX ADMIN — OXYCODONE HYDROCHLORIDE 2.5 MG: 5 SOLUTION ORAL at 16:28

## 2025-04-20 RX ADMIN — SODIUM CHLORIDE 2 G: 1 TABLET ORAL at 20:19

## 2025-04-20 RX ADMIN — SODIUM CHLORIDE 2 G: 1 TABLET ORAL at 08:09

## 2025-04-20 RX ADMIN — CARVEDILOL 25 MG: 25 TABLET, FILM COATED ORAL at 20:22

## 2025-04-20 RX ADMIN — ACETAMINOPHEN 1000 MG: 650 SOLUTION ORAL at 08:09

## 2025-04-20 RX ADMIN — LOSARTAN POTASSIUM 100 MG: 50 TABLET, FILM COATED ORAL at 08:09

## 2025-04-20 RX ADMIN — IPRATROPIUM BROMIDE AND ALBUTEROL SULFATE 3 ML: 2.5; .5 SOLUTION RESPIRATORY (INHALATION) at 03:43

## 2025-04-20 RX ADMIN — PIPERACILLIN SODIUM AND TAZOBACTAM SODIUM 3.38 G: 3; .375 INJECTION, SOLUTION INTRAVENOUS at 03:53

## 2025-04-20 RX ADMIN — SODIUM CHLORIDE 2 G: 1 TABLET ORAL at 16:21

## 2025-04-20 RX ADMIN — PIPERACILLIN SODIUM AND TAZOBACTAM SODIUM 3.38 G: 3; .375 INJECTION, SOLUTION INTRAVENOUS at 16:21

## 2025-04-20 RX ADMIN — BUDESONIDE 0.5 MG: 0.5 INHALANT RESPIRATORY (INHALATION) at 07:28

## 2025-04-20 RX ADMIN — Medication 30 PERCENT: at 19:36

## 2025-04-20 RX ADMIN — IPRATROPIUM BROMIDE AND ALBUTEROL SULFATE 3 ML: 2.5; .5 SOLUTION RESPIRATORY (INHALATION) at 19:36

## 2025-04-20 RX ADMIN — IPRATROPIUM BROMIDE AND ALBUTEROL SULFATE 3 ML: 2.5; .5 SOLUTION RESPIRATORY (INHALATION) at 11:38

## 2025-04-20 RX ADMIN — POTASSIUM & SODIUM PHOSPHATES POWDER PACK 280-160-250 MG 1 PACKET: 280-160-250 PACK at 06:08

## 2025-04-20 RX ADMIN — PIPERACILLIN SODIUM AND TAZOBACTAM SODIUM 3.38 G: 3; .375 INJECTION, SOLUTION INTRAVENOUS at 20:19

## 2025-04-20 RX ADMIN — ASPIRIN 81 MG CHEWABLE TABLET 81 MG: 81 TABLET CHEWABLE at 08:09

## 2025-04-20 RX ADMIN — Medication 30 PERCENT: at 07:28

## 2025-04-20 RX ADMIN — CITALOPRAM HYDROBROMIDE 10 MG: 20 TABLET ORAL at 08:09

## 2025-04-20 RX ADMIN — Medication 1 APPLICATION: at 08:10

## 2025-04-20 RX ADMIN — ESOMEPRAZOLE MAGNESIUM 20 MG: 40 FOR SUSPENSION ORAL at 06:08

## 2025-04-20 RX ADMIN — ACETAMINOPHEN 1000 MG: 160 SOLUTION ORAL at 20:18

## 2025-04-20 RX ADMIN — CARVEDILOL 25 MG: 25 TABLET, FILM COATED ORAL at 08:10

## 2025-04-20 RX ADMIN — IPRATROPIUM BROMIDE AND ALBUTEROL SULFATE 3 ML: 2.5; .5 SOLUTION RESPIRATORY (INHALATION) at 15:03

## 2025-04-20 RX ADMIN — INSULIN LISPRO 2 UNITS: 100 INJECTION, SOLUTION INTRAVENOUS; SUBCUTANEOUS at 01:08

## 2025-04-20 RX ADMIN — POTASSIUM CHLORIDE 40 MEQ: 1.5 POWDER, FOR SOLUTION ORAL at 06:08

## 2025-04-20 RX ADMIN — ATORVASTATIN CALCIUM 40 MG: 40 TABLET, FILM COATED ORAL at 20:18

## 2025-04-20 RX ADMIN — IPRATROPIUM BROMIDE AND ALBUTEROL SULFATE 3 ML: 2.5; .5 SOLUTION RESPIRATORY (INHALATION) at 22:48

## 2025-04-20 RX ADMIN — OXYCODONE HYDROCHLORIDE 2.5 MG: 5 SOLUTION ORAL at 12:25

## 2025-04-20 RX ADMIN — INSULIN LISPRO 2 UNITS: 100 INJECTION, SOLUTION INTRAVENOUS; SUBCUTANEOUS at 03:58

## 2025-04-20 RX ADMIN — APIXABAN 5 MG: 5 TABLET, FILM COATED ORAL at 08:09

## 2025-04-20 RX ADMIN — FUROSEMIDE 20 MG: 10 INJECTION, SOLUTION INTRAMUSCULAR; INTRAVENOUS at 16:21

## 2025-04-20 RX ADMIN — POTASSIUM & SODIUM PHOSPHATES POWDER PACK 280-160-250 MG 1 PACKET: 280-160-250 PACK at 12:25

## 2025-04-20 RX ADMIN — METHYLPREDNISOLONE SODIUM SUCCINATE 40 MG: 40 INJECTION, POWDER, FOR SOLUTION INTRAMUSCULAR; INTRAVENOUS at 08:10

## 2025-04-20 RX ADMIN — INSULIN LISPRO 4 UNITS: 100 INJECTION, SOLUTION INTRAVENOUS; SUBCUTANEOUS at 23:22

## 2025-04-20 RX ADMIN — Medication 6 MG: at 20:19

## 2025-04-20 RX ADMIN — BUDESONIDE 0.5 MG: 0.5 INHALANT RESPIRATORY (INHALATION) at 19:36

## 2025-04-20 RX ADMIN — FUROSEMIDE 40 MG: 10 INJECTION, SOLUTION INTRAMUSCULAR; INTRAVENOUS at 08:09

## 2025-04-20 RX ADMIN — INSULIN LISPRO 4 UNITS: 100 INJECTION, SOLUTION INTRAVENOUS; SUBCUTANEOUS at 17:30

## 2025-04-20 RX ADMIN — APIXABAN 5 MG: 5 TABLET, FILM COATED ORAL at 20:19

## 2025-04-20 ASSESSMENT — PAIN - FUNCTIONAL ASSESSMENT

## 2025-04-20 ASSESSMENT — PAIN SCALES - GENERAL
PAINLEVEL_OUTOF10: 0 - NO PAIN
PAINLEVEL_OUTOF10: 4

## 2025-04-20 NOTE — PROGRESS NOTES
Critical Care Medicine Progress Note    Admitted on:     4/18/2025  Length of Stay: 2 day(s)     Interval History     No acute events overnight.    24 hr UOP of 1.25 L with Lasix 40 mg given yesterday.  (This was measured with an external catheter.)    More awake and interactive today.  Probably back to her baseline.  Changed to pressure support ventilation.  Initially attempted PS 5 but she did not appear to tolerate this.  Increased to PS 8 and she did better.  Has been maintaining Vt in the high 200's to low 300's on these settings and appears comfortable.    Hb has been at or about 7.0 since admission.  Reviewed old labs.  Hb was 8.9 on 3/8, 9.4 on 3/28, 9.7 on 3/31, 8.7 on 4/7, 8.2 on 4/14.    Objective   Objective     Vitals:    04/18/25 1345   Weight: 52.2 kg (115 lb 1.3 oz)   Body mass index is 18.57 kg/m².        4/20/2025     7:00 AM 4/20/2025     7:28 AM 4/20/2025     7:38 AM 4/20/2025     8:00 AM 4/20/2025     9:00 AM 4/20/2025    10:00 AM 4/20/2025    11:00 AM   Vitals   Systolic 138   148 131 124 140   Diastolic 63   68 57 56 54   BP Location    Left arm      Heart Rate 59 71  74 67 83 74   Resp 18 18 21 21 20 25 19        Vent settings:  Vent Mode: Pressure support  FiO2 (%):  [30 %] 30 %  S RR:  [8-18] 8  S VT:  [400 mL] 400 mL  PEEP/CPAP (cm H2O):  [5 cm H20] 5 cm H20  [place] SUP:  [8 cm H20] 8 cm H20  MAP (cm H2O):  [7.8-13] 7.8    Intake/Output Summary (Last 24 hours) at 4/20/2025 1128  Last data filed at 4/20/2025 0810  Gross per 24 hour   Intake 1040 ml   Output 1251 ml   Net -211 ml       Physical Exam  Neuro: Moves all extremities.  Eyes: PERRL, clear sclerae.  CV:        Normal S1, S2.  RRR.  No m/r/g.  Resp:     Diminished with bibasilar crackles appreciated on auscultation.  GI:         +PEG tube.  +BS, abd soft, NT, ND.  Ext: No peripheral edema.  Skin:      Warm and dry.  No rashes or lesions.    Medications     Scheduled Medications:   Scheduled Medications[1]   Continuous Medications:    Continuous Medications[2]   PRN Medications:     Labs     Results from last 72 hours   Lab Units 04/20/25  0353 04/19/25  0538 04/18/25  1421   GLUCOSE mg/dL 180* 147* 207*   SODIUM mmol/L 136 132* 128*   POTASSIUM mmol/L 3.1* 3.9 5.0   CHLORIDE mmol/L 97* 97* 93*   CO2 mmol/L 33* 29 31   BUN mg/dL 26* 21 21   CREATININE mg/dL 0.71 0.58 0.48*   EGFR mL/min/1.73m*2 90 >90 >90   CALCIUM mg/dL 8.9 8.7 9.2   ALBUMIN g/dL 2.9* 3.1* 3.3*   MAGNESIUM mg/dL 1.94  --  1.82   PHOSPHORUS mg/dL 1.6*  --  4.5     Results from last 72 hours   Lab Units 04/20/25  0353 04/19/25  0538 04/18/25  1421   ALK PHOS U/L 141* 182* 214*   ALT U/L 44 56* 74*   AST U/L 21 31 63*   BILIRUBIN TOTAL mg/dL 0.3 0.3 0.2   PROTEIN TOTAL g/dL 5.8* 5.8* 6.6     Results from last 72 hours   Lab Units 04/18/25  1421   TROPHS ng/L 11     Results from last 72 hours   Lab Units 04/18/25  1421   LACTATE mmol/L 0.2*     Results from last 72 hours   Lab Units 04/20/25  0353 04/19/25  0538 04/18/25  1421   WBC AUTO x10*3/uL 6.5 7.6 13.5*   NRBC AUTO /100 WBCs 0.3* 0.0 0.4*   RBC AUTO x10*6/uL 2.49* 2.53* 2.66*   HEMOGLOBIN g/dL 7.0* 7.0* 7.3*   HEMATOCRIT % 22.1* 22.6* 24.6*   MCV fL 89 89 93   MCH pg 28.1 27.7 27.4   MCHC g/dL 31.7* 31.0* 29.7*   RDW % 16.9* 16.0* 16.2*   PLATELETS AUTO x10*3/uL 229 225 285     Results from last 72 hours   Lab Units 04/18/25  1923 04/18/25  1744 04/18/25  1625 04/18/25  1511 04/18/25  1509   POCT PH, ARTERIAL pH 7.30*  --   --   --  7.10*   POCT PCO2, ARTERIAL mm Hg 61*  --   --   --  105*   POCT PO2, ARTERIAL mm Hg 172*  --   --   --  333*   POCT HCO3 CALCULATED, ARTERIAL mmol/L 30.0*  --   --   --  32.6*   POCT LACTATE, ARTERIAL mmol/L 0.5  --   --   --  <0.3*   POCT BASE EXCESS, ARTERIAL mmol/L 3.1*  --   --   --  2.0   FIO2 % 40 65 100   < > 80    < > = values in this interval not displayed.     Lab Results   Component Value Date    BLOODCULT No growth at 1 day 04/18/2025    BLOODCULT No growth at 1 day 04/18/2025  "    No results found for: \"URINECULTURE\"    Imaging and Diagnostic Studies     Recent imaging and diagnostic studies reviewed.       Assessment / Plan     #Acute hypercapnic encephalopathy (CO2 narcosis)  #Acute on chronic hypercapnic respiratory failure  - Likely secondary to COPD and ADHF.  Mentation improved with mechanical vent support.  Weaning to trach collar as tolerated.  Continue steroids and nebulized bronchodilators.  - Lower suspicion for an infectious etiology but will continue antibiotics for now.  F/u procal.  - Continue tube feeds as ordered at [facility] ([place] @ 40 mL/hr from 6 PM to 6 AM).  Dietician consulted.     #Acute on chronic diastolic CHF  #Hypertension  - Additional gentle diuresis today.  - Continue home antihypertensives.     #Chronic hyponatremia  - Continue home salt tabs.    #Anemia  - Will order some labs and check hemoccult stool.  Of note, she is on Eliquis for chronic PE.      Eladio Lerma MD    This patient is critically ill/injured due to acute impairment in one or more vital organ systems, such that there is a probably of imminent or life-threatening deterioration of the patient's condition.  I spent 33 minutes in the direct delivery of medical care that involves high complexity decision making to treat single or multiple vital organ system failure and/or prevent further life-threatening deterioration of the patient's condition.  This time does not include separately billable procedures.         [1] ammonium lactate, 1 Application, Topical, Daily  apixaban, 5 mg, g-tube, BID  aspirin, 81 mg, g-tube, Daily  atorvastatin, 40 mg, g-tube, Nightly  budesonide, 0.5 mg, nebulization, BID  carvedilol, 25 mg, g-tube, BID  citalopram, 10 mg, g-tube, Daily  esomeprazole, 20 mg, g-tube, Daily before breakfast  insulin lispro, 0-10 Units, subcutaneous, q6h  ipratropium-albuteroL, 3 mL, nebulization, q4h  losartan, 100 mg, g-tube, Daily  melatonin, 6 mg, oral, Nightly  methylPREDNISolone " sodium succinate (PF), 40 mg, intravenous, Daily  oxygen, , inhalation, Continuous - Inhalation  piperacillin-tazobactam, 3.375 g, intravenous, q6h  potassium, sodium phosphates, 1 packet, g-tube, 4x daily  sodium chloride, 2 g, oral, TID  [2]

## 2025-04-21 VITALS
OXYGEN SATURATION: 100 % | WEIGHT: 115.08 LBS | TEMPERATURE: 97 F | HEART RATE: 67 BPM | BODY MASS INDEX: 18.49 KG/M2 | DIASTOLIC BLOOD PRESSURE: 65 MMHG | SYSTOLIC BLOOD PRESSURE: 122 MMHG | RESPIRATION RATE: 14 BRPM | HEIGHT: 66 IN

## 2025-04-21 LAB
ABO GROUP (TYPE) IN BLOOD: NORMAL
ABO GROUP (TYPE) IN BLOOD: NORMAL
ALBUMIN SERPL BCP-MCNC: 2.9 G/DL (ref 3.4–5)
ALP SERPL-CCNC: 116 U/L (ref 33–136)
ALT SERPL W P-5'-P-CCNC: 34 U/L (ref 7–45)
ANION GAP SERPL CALC-SCNC: 8 MMOL/L (ref 10–20)
ANTIBODY SCREEN: NORMAL
AST SERPL W P-5'-P-CCNC: 14 U/L (ref 9–39)
ATRIAL RATE: 77 BPM
BASOPHILS # BLD AUTO: 0.04 X10*3/UL (ref 0–0.1)
BASOPHILS NFR BLD AUTO: 0.4 %
BILIRUB SERPL-MCNC: 0.3 MG/DL (ref 0–1.2)
BUN SERPL-MCNC: 29 MG/DL (ref 6–23)
CALCIUM SERPL-MCNC: 8.7 MG/DL (ref 8.6–10.3)
CHLORIDE SERPL-SCNC: 95 MMOL/L (ref 98–107)
CO2 SERPL-SCNC: 37 MMOL/L (ref 21–32)
CREAT SERPL-MCNC: 0.84 MG/DL (ref 0.5–1.05)
EGFRCR SERPLBLD CKD-EPI 2021: 74 ML/MIN/1.73M*2
EOSINOPHIL # BLD AUTO: 0.16 X10*3/UL (ref 0–0.4)
EOSINOPHIL NFR BLD AUTO: 1.7 %
ERYTHROCYTE [DISTWIDTH] IN BLOOD BY AUTOMATED COUNT: 17.2 % (ref 11.5–14.5)
FERRITIN SERPL-MCNC: 93 NG/ML (ref 8–150)
FOLATE SERPL-MCNC: 14.6 NG/ML
GLUCOSE BLD MANUAL STRIP-MCNC: 152 MG/DL (ref 74–99)
GLUCOSE BLD MANUAL STRIP-MCNC: 178 MG/DL (ref 74–99)
GLUCOSE BLD MANUAL STRIP-MCNC: 210 MG/DL (ref 74–99)
GLUCOSE BLD MANUAL STRIP-MCNC: 222 MG/DL (ref 74–99)
GLUCOSE SERPL-MCNC: 78 MG/DL (ref 74–99)
HCT VFR BLD AUTO: 22.9 % (ref 36–46)
HGB BLD-MCNC: 6.9 G/DL (ref 12–16)
HGB RETIC QN: 21 PG (ref 28–38)
HOLD SPECIMEN: NORMAL
IMM GRANULOCYTES # BLD AUTO: 0.05 X10*3/UL (ref 0–0.5)
IMM GRANULOCYTES NFR BLD AUTO: 0.5 % (ref 0–0.9)
IMMATURE RETIC FRACTION: 18.9 %
IRON SATN MFR SERPL: 21 % (ref 25–45)
IRON SERPL-MCNC: 48 UG/DL (ref 35–150)
LYMPHOCYTES # BLD AUTO: 1.18 X10*3/UL (ref 0.8–3)
LYMPHOCYTES NFR BLD AUTO: 12.5 %
MAGNESIUM SERPL-MCNC: 1.75 MG/DL (ref 1.6–2.4)
MCH RBC QN AUTO: 27.3 PG (ref 26–34)
MCHC RBC AUTO-ENTMCNC: 30.1 G/DL (ref 32–36)
MCV RBC AUTO: 91 FL (ref 80–100)
MONOCYTES # BLD AUTO: 0.93 X10*3/UL (ref 0.05–0.8)
MONOCYTES NFR BLD AUTO: 9.8 %
NEUTROPHILS # BLD AUTO: 7.11 X10*3/UL (ref 1.6–5.5)
NEUTROPHILS NFR BLD AUTO: 75.1 %
NRBC BLD-RTO: 0.2 /100 WBCS (ref 0–0)
P AXIS: 85 DEGREES
P OFFSET: 179 MS
P ONSET: 119 MS
PHOSPHATE SERPL-MCNC: 2.6 MG/DL (ref 2.5–4.9)
PLATELET # BLD AUTO: 241 X10*3/UL (ref 150–450)
POTASSIUM SERPL-SCNC: 3.3 MMOL/L (ref 3.5–5.3)
PR INTERVAL: 212 MS
PROT SERPL-MCNC: 5.7 G/DL (ref 6.4–8.2)
Q ONSET: 225 MS
QRS COUNT: 12 BEATS
QRS DURATION: 142 MS
QT INTERVAL: 456 MS
QTC CALCULATION(BAZETT): 516 MS
QTC FREDERICIA: 495 MS
R AXIS: -18 DEGREES
RBC # BLD AUTO: 2.53 X10*6/UL (ref 4–5.2)
RETICS #: 0.12 X10*6/UL (ref 0.02–0.11)
RETICS/RBC NFR AUTO: 4.6 % (ref 0.5–2)
RH FACTOR (ANTIGEN D): NORMAL
RH FACTOR (ANTIGEN D): NORMAL
SODIUM SERPL-SCNC: 137 MMOL/L (ref 136–145)
T AXIS: 65 DEGREES
T OFFSET: 453 MS
TIBC SERPL-MCNC: 225 UG/DL (ref 240–445)
UIBC SERPL-MCNC: 177 UG/DL (ref 110–370)
VENTRICULAR RATE: 77 BPM
VIT B12 SERPL-MCNC: 1805 PG/ML (ref 211–911)
WBC # BLD AUTO: 9.5 X10*3/UL (ref 4.4–11.3)

## 2025-04-21 PROCEDURE — 85045 AUTOMATED RETICULOCYTE COUNT: CPT | Performed by: ANESTHESIOLOGY

## 2025-04-21 PROCEDURE — 2500000004 HC RX 250 GENERAL PHARMACY W/ HCPCS (ALT 636 FOR OP/ED): Performed by: ANESTHESIOLOGY

## 2025-04-21 PROCEDURE — 86850 RBC ANTIBODY SCREEN: CPT | Performed by: ANESTHESIOLOGY

## 2025-04-21 PROCEDURE — P9016 RBC LEUKOCYTES REDUCED: HCPCS

## 2025-04-21 PROCEDURE — 2500000002 HC RX 250 W HCPCS SELF ADMINISTERED DRUGS (ALT 637 FOR MEDICARE OP, ALT 636 FOR OP/ED): Performed by: ANESTHESIOLOGY

## 2025-04-21 PROCEDURE — 82607 VITAMIN B-12: CPT | Mod: AHULAB | Performed by: ANESTHESIOLOGY

## 2025-04-21 PROCEDURE — 36415 COLL VENOUS BLD VENIPUNCTURE: CPT | Performed by: ANESTHESIOLOGY

## 2025-04-21 PROCEDURE — 83550 IRON BINDING TEST: CPT | Performed by: ANESTHESIOLOGY

## 2025-04-21 PROCEDURE — 82728 ASSAY OF FERRITIN: CPT | Performed by: ANESTHESIOLOGY

## 2025-04-21 PROCEDURE — 5A0945A ASSISTANCE WITH RESPIRATORY VENTILATION, 24-96 CONSECUTIVE HOURS, HIGH NASAL FLOW/VELOCITY: ICD-10-PCS | Performed by: ANESTHESIOLOGY

## 2025-04-21 PROCEDURE — 2060000001 HC INTERMEDIATE ICU ROOM DAILY

## 2025-04-21 PROCEDURE — 99233 SBSQ HOSP IP/OBS HIGH 50: CPT | Performed by: ANESTHESIOLOGY

## 2025-04-21 PROCEDURE — 82525 ASSAY OF COPPER: CPT | Performed by: ANESTHESIOLOGY

## 2025-04-21 PROCEDURE — 83540 ASSAY OF IRON: CPT | Performed by: ANESTHESIOLOGY

## 2025-04-21 PROCEDURE — 82947 ASSAY GLUCOSE BLOOD QUANT: CPT

## 2025-04-21 PROCEDURE — 2500000004 HC RX 250 GENERAL PHARMACY W/ HCPCS (ALT 636 FOR OP/ED): Mod: JZ

## 2025-04-21 PROCEDURE — 94640 AIRWAY INHALATION TREATMENT: CPT

## 2025-04-21 PROCEDURE — 2500000005 HC RX 250 GENERAL PHARMACY W/O HCPCS: Performed by: ANESTHESIOLOGY

## 2025-04-21 PROCEDURE — 2500000004 HC RX 250 GENERAL PHARMACY W/ HCPCS (ALT 636 FOR OP/ED): Mod: JZ | Performed by: INTERNAL MEDICINE

## 2025-04-21 PROCEDURE — 84207 ASSAY OF VITAMIN B-6: CPT | Performed by: ANESTHESIOLOGY

## 2025-04-21 PROCEDURE — 2500000005 HC RX 250 GENERAL PHARMACY W/O HCPCS: Performed by: INTERNAL MEDICINE

## 2025-04-21 PROCEDURE — 85025 COMPLETE CBC W/AUTO DIFF WBC: CPT | Performed by: INTERNAL MEDICINE

## 2025-04-21 PROCEDURE — 2500000001 HC RX 250 WO HCPCS SELF ADMINISTERED DRUGS (ALT 637 FOR MEDICARE OP)

## 2025-04-21 PROCEDURE — 82746 ASSAY OF FOLIC ACID SERUM: CPT | Mod: AHULAB | Performed by: ANESTHESIOLOGY

## 2025-04-21 PROCEDURE — 94003 VENT MGMT INPAT SUBQ DAY: CPT

## 2025-04-21 PROCEDURE — 86901 BLOOD TYPING SEROLOGIC RH(D): CPT | Performed by: ANESTHESIOLOGY

## 2025-04-21 PROCEDURE — 94681 O2 UPTK CO2 OUTP % O2 XTRC: CPT

## 2025-04-21 PROCEDURE — 83735 ASSAY OF MAGNESIUM: CPT | Performed by: ANESTHESIOLOGY

## 2025-04-21 PROCEDURE — 36415 COLL VENOUS BLD VENIPUNCTURE: CPT | Performed by: INTERNAL MEDICINE

## 2025-04-21 PROCEDURE — 2500000001 HC RX 250 WO HCPCS SELF ADMINISTERED DRUGS (ALT 637 FOR MEDICARE OP): Performed by: ANESTHESIOLOGY

## 2025-04-21 PROCEDURE — 36430 TRANSFUSION BLD/BLD COMPNT: CPT

## 2025-04-21 PROCEDURE — 80053 COMPREHEN METABOLIC PANEL: CPT | Performed by: INTERNAL MEDICINE

## 2025-04-21 PROCEDURE — 84100 ASSAY OF PHOSPHORUS: CPT | Performed by: ANESTHESIOLOGY

## 2025-04-21 PROCEDURE — 86923 COMPATIBILITY TEST ELECTRIC: CPT

## 2025-04-21 RX ORDER — QUETIAPINE FUMARATE 25 MG/1
12.5 TABLET, FILM COATED ORAL NIGHTLY PRN
Status: DISCONTINUED | OUTPATIENT
Start: 2025-04-21 | End: 2025-04-22

## 2025-04-21 RX ORDER — POTASSIUM CHLORIDE 1.5 G/1.58G
20 POWDER, FOR SOLUTION ORAL ONCE
Status: COMPLETED | OUTPATIENT
Start: 2025-04-21 | End: 2025-04-21

## 2025-04-21 RX ORDER — FUROSEMIDE 10 MG/ML
40 INJECTION INTRAMUSCULAR; INTRAVENOUS ONCE
Status: COMPLETED | OUTPATIENT
Start: 2025-04-21 | End: 2025-04-21

## 2025-04-21 RX ORDER — MAGNESIUM SULFATE HEPTAHYDRATE 40 MG/ML
2 INJECTION, SOLUTION INTRAVENOUS
Status: COMPLETED | OUTPATIENT
Start: 2025-04-21 | End: 2025-04-21

## 2025-04-21 RX ADMIN — Medication 30 PERCENT: at 19:21

## 2025-04-21 RX ADMIN — IPRATROPIUM BROMIDE AND ALBUTEROL SULFATE 3 ML: 2.5; .5 SOLUTION RESPIRATORY (INHALATION) at 11:15

## 2025-04-21 RX ADMIN — SODIUM CHLORIDE 2 G: 1 TABLET ORAL at 20:39

## 2025-04-21 RX ADMIN — Medication 30 PERCENT: at 11:15

## 2025-04-21 RX ADMIN — ACETAMINOPHEN 1000 MG: 160 SOLUTION ORAL at 20:39

## 2025-04-21 RX ADMIN — ESOMEPRAZOLE MAGNESIUM 20 MG: 40 FOR SUSPENSION ORAL at 06:18

## 2025-04-21 RX ADMIN — SODIUM CHLORIDE 2 G: 1 TABLET ORAL at 09:12

## 2025-04-21 RX ADMIN — POTASSIUM CHLORIDE 20 MEQ: 1.5 POWDER, FOR SOLUTION ORAL at 09:30

## 2025-04-21 RX ADMIN — PIPERACILLIN SODIUM AND TAZOBACTAM SODIUM 3.38 G: 3; .375 INJECTION, SOLUTION INTRAVENOUS at 03:12

## 2025-04-21 RX ADMIN — BUDESONIDE 0.5 MG: 0.5 INHALANT RESPIRATORY (INHALATION) at 07:20

## 2025-04-21 RX ADMIN — Medication 6 MG: at 20:39

## 2025-04-21 RX ADMIN — LOSARTAN POTASSIUM 100 MG: 50 TABLET, FILM COATED ORAL at 09:12

## 2025-04-21 RX ADMIN — OXYCODONE HYDROCHLORIDE 2.5 MG: 5 SOLUTION ORAL at 00:31

## 2025-04-21 RX ADMIN — ACETAMINOPHEN 1000 MG: 160 SOLUTION ORAL at 11:57

## 2025-04-21 RX ADMIN — PIPERACILLIN SODIUM AND TAZOBACTAM SODIUM 3.38 G: 3; .375 INJECTION, SOLUTION INTRAVENOUS at 09:13

## 2025-04-21 RX ADMIN — Medication 30 PERCENT: at 03:05

## 2025-04-21 RX ADMIN — IPRATROPIUM BROMIDE AND ALBUTEROL SULFATE 3 ML: 2.5; .5 SOLUTION RESPIRATORY (INHALATION) at 07:20

## 2025-04-21 RX ADMIN — OXYCODONE HYDROCHLORIDE 2.5 MG: 5 SOLUTION ORAL at 20:39

## 2025-04-21 RX ADMIN — SODIUM CHLORIDE 2 G: 1 TABLET ORAL at 15:15

## 2025-04-21 RX ADMIN — ASPIRIN 81 MG CHEWABLE TABLET 81 MG: 81 TABLET CHEWABLE at 09:12

## 2025-04-21 RX ADMIN — IPRATROPIUM BROMIDE AND ALBUTEROL SULFATE 3 ML: 2.5; .5 SOLUTION RESPIRATORY (INHALATION) at 15:47

## 2025-04-21 RX ADMIN — APIXABAN 5 MG: 5 TABLET, FILM COATED ORAL at 20:39

## 2025-04-21 RX ADMIN — BUDESONIDE 0.5 MG: 0.5 INHALANT RESPIRATORY (INHALATION) at 19:21

## 2025-04-21 RX ADMIN — Medication 30 PERCENT: at 07:40

## 2025-04-21 RX ADMIN — MAGNESIUM SULFATE HEPTAHYDRATE 2 G: 40 INJECTION, SOLUTION INTRAVENOUS at 11:59

## 2025-04-21 RX ADMIN — APIXABAN 5 MG: 5 TABLET, FILM COATED ORAL at 09:13

## 2025-04-21 RX ADMIN — METHYLPREDNISOLONE SODIUM SUCCINATE 40 MG: 40 INJECTION, POWDER, FOR SOLUTION INTRAMUSCULAR; INTRAVENOUS at 09:13

## 2025-04-21 RX ADMIN — Medication 30 PERCENT: at 23:10

## 2025-04-21 RX ADMIN — MAGNESIUM SULFATE HEPTAHYDRATE 2 G: 40 INJECTION, SOLUTION INTRAVENOUS at 14:00

## 2025-04-21 RX ADMIN — CARVEDILOL 25 MG: 25 TABLET, FILM COATED ORAL at 20:39

## 2025-04-21 RX ADMIN — INSULIN LISPRO 4 UNITS: 100 INJECTION, SOLUTION INTRAVENOUS; SUBCUTANEOUS at 23:10

## 2025-04-21 RX ADMIN — CITALOPRAM HYDROBROMIDE 10 MG: 20 TABLET ORAL at 09:12

## 2025-04-21 RX ADMIN — Medication 30 PERCENT: at 07:20

## 2025-04-21 RX ADMIN — INSULIN LISPRO 2 UNITS: 100 INJECTION, SOLUTION INTRAVENOUS; SUBCUTANEOUS at 05:26

## 2025-04-21 RX ADMIN — IPRATROPIUM BROMIDE AND ALBUTEROL SULFATE 3 ML: 2.5; .5 SOLUTION RESPIRATORY (INHALATION) at 03:05

## 2025-04-21 RX ADMIN — CARVEDILOL 25 MG: 25 TABLET, FILM COATED ORAL at 09:12

## 2025-04-21 RX ADMIN — OXYCODONE HYDROCHLORIDE 2.5 MG: 5 SOLUTION ORAL at 09:12

## 2025-04-21 RX ADMIN — FUROSEMIDE 40 MG: 10 INJECTION, SOLUTION INTRAMUSCULAR; INTRAVENOUS at 11:57

## 2025-04-21 RX ADMIN — IPRATROPIUM BROMIDE AND ALBUTEROL SULFATE 3 ML: 2.5; .5 SOLUTION RESPIRATORY (INHALATION) at 19:21

## 2025-04-21 RX ADMIN — IPRATROPIUM BROMIDE AND ALBUTEROL SULFATE 3 ML: 2.5; .5 SOLUTION RESPIRATORY (INHALATION) at 23:10

## 2025-04-21 RX ADMIN — ATORVASTATIN CALCIUM 40 MG: 40 TABLET, FILM COATED ORAL at 20:39

## 2025-04-21 ASSESSMENT — COGNITIVE AND FUNCTIONAL STATUS - GENERAL
PERSONAL GROOMING: A LITTLE
HELP NEEDED FOR BATHING: A LOT
DAILY ACTIVITIY SCORE: 14
EATING MEALS: A LITTLE
MOBILITY SCORE: 11
STANDING UP FROM CHAIR USING ARMS: A LOT
MOVING FROM LYING ON BACK TO SITTING ON SIDE OF FLAT BED WITH BEDRAILS: A LITTLE
DRESSING REGULAR UPPER BODY CLOTHING: A LOT
MOVING TO AND FROM BED TO CHAIR: A LOT
DRESSING REGULAR LOWER BODY CLOTHING: A LOT
CLIMB 3 TO 5 STEPS WITH RAILING: TOTAL
WALKING IN HOSPITAL ROOM: TOTAL
TURNING FROM BACK TO SIDE WHILE IN FLAT BAD: A LOT
TOILETING: A LOT

## 2025-04-21 ASSESSMENT — PAIN SCALES - GENERAL
PAINLEVEL_OUTOF10: 0 - NO PAIN
PAINLEVEL_OUTOF10: 3
PAINLEVEL_OUTOF10: 4
PAINLEVEL_OUTOF10: 0 - NO PAIN
PAINLEVEL_OUTOF10: 7
PAINLEVEL_OUTOF10: 9

## 2025-04-21 ASSESSMENT — PAIN - FUNCTIONAL ASSESSMENT
PAIN_FUNCTIONAL_ASSESSMENT: 0-10
PAIN_FUNCTIONAL_ASSESSMENT: CPOT (CRITICAL CARE PAIN OBSERVATION TOOL)
PAIN_FUNCTIONAL_ASSESSMENT: 0-10
PAIN_FUNCTIONAL_ASSESSMENT: CPOT (CRITICAL CARE PAIN OBSERVATION TOOL)
PAIN_FUNCTIONAL_ASSESSMENT: CPOT (CRITICAL CARE PAIN OBSERVATION TOOL)
PAIN_FUNCTIONAL_ASSESSMENT: 0-10

## 2025-04-21 ASSESSMENT — PAIN SCALES - PAIN ASSESSMENT IN ADVANCED DEMENTIA (PAINAD): TOTALSCORE: MEDICATION (SEE MAR)

## 2025-04-21 ASSESSMENT — ACTIVITIES OF DAILY LIVING (ADL): LACK_OF_TRANSPORTATION: PATIENT UNABLE TO ANSWER

## 2025-04-21 NOTE — CARE PLAN
The patient's goals for the shift include  Patient to get adequate rest this shift       The clinical goals for the shift include wean from ventilator throughout shift      Problem: Skin  Goal: Promote/optimize nutrition  Outcome: Progressing  Goal: Participates in plan/prevention/treatment measures  Outcome: Progressing  Goal: Prevent/manage excess moisture  Outcome: Progressing  Goal: Prevent/minimize sheer/friction injuries  Outcome: Progressing     Problem: Pain - Adult  Goal: Verbalizes/displays adequate comfort level or baseline comfort level  Outcome: Progressing     Problem: Safety - Adult  Goal: Free from fall injury  Outcome: Progressing     Problem: Discharge Planning  Goal: Discharge to home or other facility with appropriate resources  Outcome: Progressing     Problem: Chronic Conditions and Co-morbidities  Goal: Patient's chronic conditions and co-morbidity symptoms are monitored and maintained or improved  Outcome: Progressing     Problem: Nutrition  Goal: Nutrient intake appropriate for maintaining nutritional needs  Outcome: Progressing     Problem: Fall/Injury  Goal: Not fall by end of shift  Outcome: Progressing  Goal: Be free from injury by end of the shift  Outcome: Progressing  Goal: Verbalize understanding of personal risk factors for fall in the hospital  Outcome: Progressing  Goal: Verbalize understanding of risk factor reduction measures to prevent injury from fall in the home  Outcome: Progressing  Goal: Use assistive devices by end of the shift  Outcome: Progressing     Problem: Pain  Goal: Takes deep breaths with improved pain control throughout the shift  Outcome: Progressing  Goal: Turns in bed with improved pain control throughout the shift  Outcome: Progressing  Goal: Free from opioid side effects throughout the shift  Outcome: Progressing  Goal: Free from acute confusion related to pain meds throughout the shift  Outcome: Progressing     Problem: Respiratory  Goal: Clear secretions  with interventions this shift  Outcome: Progressing  Goal: Minimize anxiety/maximize coping throughout shift  Outcome: Progressing  Goal: Minimal/no exertional discomfort or dyspnea this shift  Outcome: Progressing  Goal: No signs of respiratory distress (eg. Use of accessory muscles. Peds grunting)  Outcome: Progressing  Goal: Patent airway maintained this shift  Outcome: Progressing  Goal: Tolerate mechanical ventilation evidenced by VS/agitation level this shift  Outcome: Progressing  Goal: Tolerate pulmonary toileting this shift  Outcome: Progressing  Goal: Verbalize decreased shortness of breath this shift  Outcome: Progressing  Goal: Wean oxygen to maintain O2 saturation per order/standard this shift  Outcome: Progressing  Goal: Increase self care and/or family involvement in next 24 hours  Outcome: Progressing

## 2025-04-21 NOTE — PROGRESS NOTES
04/21/25 0740   [facility] Disability Status   Are you deaf or do you have serious difficulty hearing? N   Are you blind or do you have serious difficulty seeing, even when wearing glasses? N   Because of a physical, mental, or emotional condition, do you have serious difficulty concentrating, remembering, or making decisions? (5 years old or older) Y   Do you have serious difficulty walking or climbing stairs? Y   Do you have serious difficulty dressing or bathing? Y   Because of a physical, mental, or emotional condition, do you have serious difficulty doing errands alone such as visiting the doctor? Y

## 2025-04-21 NOTE — CONSULTS
"Nutrition Initial Assessment Note  Nutrition Assessment      Reason for Assessment: Enteral assessment/recommendation (TF), Provider consult order    Problem List[1]  Medical History[2]      History:  Food and Nutrient History: oral diet + noctunral TF via SNF report: regular mech soft/bite sized wtih thin liquids; noctunral TF via PEG: Nepro @ 40 ml/hr x 12 hours (6p-6a), water flushes 30 ml every four hours.         Additional Food and Nutrient Administration History: as above       Dietary Orders (From admission, onward)       Start     Ordered    04/19/25 1332  Enteral feeding with NPO GT (gastric tube); 40; 6 PM to 6 AM; 30; Water; Tap water; Every 4 hours  Diet effective now        Question Answer Comment   Tube feeding formula: Nepro with Carbsteady    Feeding route: GT (gastric tube)    Tube feeding continuous rate (mL/hr): 40    Tube feeding cyclic (start / stop time): 6 PM to 6 AM    Tube feeding flush (mL): 30    Flush type: Water    Water type: Tap water    Flush frequency: Every 4 hours        04/19/25 1333    04/18/25 2245  May Not Participate in Room Service  ( ROOM SERVICE MAY NOT PARTICIPATE)  Once        Question:  .  Answer:  Yes    04/18/25 2244                    Anthropometrics:  Height: 167.6 cm (5' 5.98\")  Weight: 49.6 kg (109 lb 5.6 oz)  BMI (Calculated): 17.66    Weight History / % Weight Change: wt hx unknown    IBW/kg (Dietitian Calculated): 59.1 kg  Percent of IBW: 84 %     Scheduled medications  Scheduled Medications[3]  Continuous medications  Continuous Medications[4]  PRN medications  PRN Medications[5]   Latest Reference Range & Units 04/21/25 08:10   GLUCOSE 74 - 99 mg/dL 78   SODIUM 136 - 145 mmol/L 137   POTASSIUM 3.5 - 5.3 mmol/L 3.3 (L)   CHLORIDE 98 - 107 mmol/L 95 (L)   Bicarbonate 21 - 32 mmol/L 37 (H)   Anion Gap 10 - 20 mmol/L 8 (L)   Blood Urea Nitrogen 6 - 23 mg/dL 29 (H)   Creatinine 0.50 - 1.05 mg/dL 0.84   EGFR >60 mL/min/1.73m*2 74   Calcium 8.6 - 10.3 mg/dL 8.7 " "  PHOSPHORUS 2.5 - 4.9 mg/dL 2.6   Albumin 3.4 - 5.0 g/dL 2.9 (L)   Alkaline Phosphatase 33 - 136 U/L 116   ALT 7 - 45 U/L 34   AST 9 - 39 U/L 14   Bilirubin Total 0.0 - 1.2 mg/dL 0.3   FERRITIN 8 - 150 ng/mL 93   Total Protein 6.4 - 8.2 g/dL 5.7 (L)   IRON 35 - 150 ug/dL 48   TIBC 240 - 445 ug/dL 225 (L)   % Saturation 25 - 45 % 21 (L)   MAGNESIUM 1.60 - 2.40 mg/dL 1.75   (L): Data is abnormally low  (H): Data is abnormally high    I/O last 3 completed shifts:  In: 1831 (36.9 mL/kg) [P.O.:120; NG/GT:1411; IV Piggyback:300]  Out: 1251 (25.2 mL/kg) [Urine:1250 (0.7 mL/kg/hr); Stool:1]  Weight: 49.6 kg   I/O this shift:  In: -   Out: 450 [Urine:450]    X 3 BM today per documentation         Energy Needs:  Height: 167.6 cm (5' 5.98\")  Minute Ventilation (L/min): 6.6 L/min  Temp: 36.5 °C (97.7 °F)    Method for Estimating Needs: 2110-2414 kcal/day (25-35 kcal/kg)    Method for Estimating 24 Hour Protein Needs: 60-75 g/d protein ( 1.2-1.5 g/kg)    Total Fluid Estimated Needs in 24 Hours (mL): 1250 mL  Total Fluid Estimated Needs in 24 hours (mL/kg): 25 mL/kg  Method for Estimating 24 Hour Fluid Needs: or as per MD         Nutrition Focused Physical Findings:  Orbital Fat Pads: Severe (dark circles, hollowing and loose skin)  Buccal Fat Pads: Severe (hollow, sunken and narrow face)    Temporalis: Severe (hollowed scooping depression)  Pectoralis (Clavicular Region): Severe (protruding prominent clavicle)  Deltoid/Trapezius: Severe (squared shoulders, acromion process prominent)    Edema: none         Skin: Positive (TC, PEG)  Respiratory : Positive (TC, however has been needing ventilator intermittently this admission)       Nutrition Diagnosis   Malnutrition Diagnosis  Patient has Malnutrition Diagnosis: Yes  Diagnosis Status: New  Malnutrition Diagnosis: Moderate malnutrition related to chronic disease or condition  Related to: complex comorbidities  As Evidenced by: pt with moderate/severely depleted adipose tissues " and skeletal tissue wasting per nutrition focused physical exam        Nutrition Interventions/Recommendations   Nutrition Prescription: Nutrition prescription for oral nutrition, Nutrition prescription for enteral nutrition  Individualized Nutrition Prescription Provided for : SLP consulted for swallow eval : If pt passes and oral diet implemented, recommend oral diet consistency as per SLP recommendations and continue nocturnal TF. If pt remains NPO, recommend transition to continuous TF  - nocturnal TF as ordered provides 864 kcal/day, 39 g/d protein, 349 ml free water   - IF pt remains NPO and does not pass SLP swallow eval, recommend change TF to continuous rate, Nepro @ 35 ml/hr (will provide 1512 kcal/day, 68 g/day protein, 611 ml free water)   - suggest MVI once daily  - monitor wt trend, oral intake if pt safe for oral diet       Food and/or Nutrient Delivery Interventions  Meals and Snacks: Other (Comment)  Goal: SLP swallow eval pending     Method of Enteral Nutrition Feeding Adminstration: Intermittent enteral nutrition feeding  Body Position for Enteral Nutrition Administration: Head of bed elevates >30 degrees     Enteral Intake: Other (Comment)  Goal: at this time, continue nocturnal TF as ordered as d/w MD        Collaboration and Referral of Nutrition Care: Collaboration by nutrition professional with other providers  Coordination of Care with Providers: Nursing, Provider    Education Documentation  No documentation found.      Nutrition Monitoring and Evaluation   Food and Nutrient Related History  Estimated Energy Intake: Energy intake greater or equal to 75% of estimated energy needs, Other criteria  Criteria: pending swallow eval    Fluid Intake: Estimated fluid intake    Intake / Amount of food: Consumes at least 75% or more of meals/snacks/supplements, Meets > 75% estimated energy needs, Other criteria  Criteria: pending swallow eval    Enteral and Parenteral Nutrition Intake Determination:  Enteral nutrition intake - Other  Criteria: continuous v nocturnal TF pending swallow eval      Anthropometrics: Body Composition/Growth/Weight History  Body Weight: Body weight - Maintain stable weight    Body Weight Change: Body weight change percentage      Biochemical Data, Medical Tests and Procedures  Electrolyte and Renal Panel: Electrolytes within normal limits, BUN, Calcium, ionized, Calcium, serum, Chloride, Creatinine, Magnesium, GFR, Sodium, Potassium, Phosphorus  Criteria: daily or as per MD    Gastrointestinal Profile: Alanine aminotransferase (ALT), Alkaline phosphatase, Aspartate aminotransferase (AST), Bilirubin, total  Criteria: as clinically indicated    Glucose/Endocrine Profile: Glucose within normal limits ( mg/dL), Hemoglobin A1c (HgbA1c)  Criteria: as clincially indicated    Nutritional Anemia Profile: Hematocrit, Hemoglobin, Iron, serum, B12, Folate, serum  Criteria: as clinically indicated    Vitamin Profile: Vitamin D, 25 hydroxy  Criteria: as appropriate    Nutrition Focused Physical Findings  Adipose Finding: Other (Comment)  Criteria: monitor NFPE    Bones Finding: Other (Comment)  Criteria: monitor NFPE    Digestive System Finding: Other (Comment)  Criteria: monitor GI function closely    Muscle Finding: Other (Comment)  Criteria: monitor NFPE    Skin Finding: Other (Comment)  Criteria: monitor skin itegrity closely    Criteria: monitor +/- edema    Mouth Finding: Other (Comment)  Criteria: monitor;pending swallow eval    Teeth Finding: Other (Comment)  Criteria: monitor         Time Spent (min): 60 minutes  Last Date of Nutrition Visit: 04/21/25  Nutrition Follow-Up Needed?: Dietitian to reassess per policy  Follow up Comment: mod maln, PEG, oral diet + nocturnal TF; KENDALL            [1]   Patient Active Problem List  Diagnosis    Acute on chronic respiratory failure with hypercapnia    Pneumonia of right lower lobe due to infectious organism   [2]   Past Medical  History:  Diagnosis Date    CHF (congestive heart failure)     COPD (chronic obstructive pulmonary disease) (Multi)     Pulmonary embolism     Stroke (Multi)    [3] ammonium lactate, 1 Application, Topical, Daily  apixaban, 5 mg, g-tube, BID  aspirin, 81 mg, g-tube, Daily  atorvastatin, 40 mg, g-tube, Nightly  budesonide, 0.5 mg, nebulization, BID  carvedilol, 25 mg, g-tube, BID  citalopram, 10 mg, g-tube, Daily  esomeprazole, 20 mg, g-tube, Daily before breakfast  insulin lispro, 0-10 Units, subcutaneous, q6h  ipratropium-albuteroL, 3 mL, nebulization, q4h  losartan, 100 mg, g-tube, Daily  magnesium sulfate, 2 g, intravenous, q2h  melatonin, 6 mg, oral, Nightly  methylPREDNISolone sodium succinate (PF), 40 mg, intravenous, Daily  sodium chloride, 2 g, oral, TID  [4]    [5] PRN medications: acetaminophen, dextrose, dextrose, glucagon, glucagon, hydrALAZINE, oxyCODONE, oxygen, oxygen, QUEtiapine

## 2025-04-21 NOTE — PROGRESS NOTES
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 chronic trach-oxygen, PEG feedings, IV ATB, IV solumedrol, IV lasix, duoneb   Status: Inpatient day 3  Payor source: Cindy aldana  Discharge disposition: [facility]  Potential Barriers: ?need oxygen set up @ facility  ADOD: 4/24/2025   OLVIN Al RN, BSN Transitional Care Coordinator ED# [phone]      04/21/25 0742   Discharge Planning   Living Arrangements Alone   Support Systems Children   Assistance Needed chronic trach- ?need oxygen @ facility, PEG feedings, IV ATB, IV solumedrol, IV lasix, duoneb   Type of Residence Skilled nursing facility   Do you have animals or pets at home? No   Home or Post Acute Services Post acute facilities (Rehab/SNF/etc)   Type of Post Acute Facility Services Skilled nursing   Expected Discharge Disposition SNF   Does the patient need discharge transport arranged? Yes   RoundTrip coordination needed? Yes   Has discharge transport been arranged? No   What day is the transport expected? 04/24/25   Financial Resource Strain   How hard is it for you to pay for the very basics like food, housing, medical care, and heating? Pt Unable   Housing Stability   In the last 12 months, was there a time when you were not able to pay the mortgage or rent on time? Pt Unable   In the past 12 months, how many times have you moved where you were living? 1   At any time in the past 12 months, were you homeless or living in a shelter (including now)? Pt Unable   Transportation Needs   In the past 12 months, has lack of transportation kept you from medical appointments or from getting medications? Pt Unable   In the past 12 months, has lack of transportation kept you from meetings, work, or from getting things needed for daily living? Pt Unable   Stroke Family Assessment   Stroke Family Assessment Needed No   Intensity of Service   Intensity of Service 0-30 min

## 2025-04-21 NOTE — PROGRESS NOTES
Critical Care Medicine Progress Note    Admitted on:     4/18/2025  Length of Stay: 3 day(s)     Interval History     24 hr UOP was 600 mL after a total of Lasix 60 mg given yesterday.  This is likely a significant underestimate because she has an external catheter, not a East, and there were 6 unmeasured urine occurrences.    Tolerated pressure support ventilation yesterday and overnight.  Put on trach collar at ~0730 this AM and has been tolerating it well.    Tolerating enteral nutrition.  Having diarrhea.  6 BMs over 24 hrs.    AM labs notable for Hb 6.9.  Hemoccult stool was negative.  Ongoing workup.    No leukocytosis or fevers.  Blood cultures on 4/18 NGTD.  Procal on 4/20 was 0.14.    Awake, alert, and interactive on exam.  Seems like she's hard of hearing but understands if you speak loudly and slowly.  Mouths words in response.  Said she didn't sleep well last night and asked me for something to help with that.  Still with bilateral rales appreciated on auscultation.    Objective   Objective     Vitals:    04/21/25 0530   Weight: 49.6 kg (109 lb 5.6 oz)   Body mass index is 17.65 kg/m².        4/21/2025     5:30 AM 4/21/2025     6:00 AM 4/21/2025     7:00 AM 4/21/2025     7:20 AM 4/21/2025     8:00 AM 4/21/2025     9:00 AM 4/21/2025    10:00 AM   Vitals   Systolic  99 108  123 117 138   Diastolic  75 57  56 76 64   Heart Rate  79 73  73 73 70   Resp  18 21 18 20 18 18   Weight (lb) 109.35         BMI 17.65 kg/m2         BSA (m2) 1.52 m2              Vent settings:  Vent Mode: CPAP  FiO2 (%):  [30 %] 30 %  PEEP/CPAP (cm H2O):  [5 cm H20] 5 cm H20  RI SUP:  [8 cm H20] 8 cm H20  MAP (cm H2O):  [7.5-8] 8    Intake/Output Summary (Last 24 hours) at 4/21/2025 1045  Last data filed at 4/21/2025 0400  Gross per 24 hour   Intake 911 ml   Output 600 ml   Net 311 ml       Physical Exam  Neuro: Awake and alert.  Moves all extremities.  Eyes: PERRL, clear sclerae.  CV:        Normal S1, S2.  RRR.  No m/r/g.  Resp:      Diminished with bibasilar crackles appreciated on auscultation.  GI:         +PEG tube.  +BS, abd soft, NT, ND.  Ext: No peripheral edema.  Skin:      Warm and dry.  No rashes or lesions.    Medications     Scheduled Medications:   Scheduled Medications[1]   Continuous Medications:   Continuous Medications[2]   PRN Medications:     Labs     Results from last 72 hours   Lab Units 04/21/25  0810 04/20/25  0353 04/19/25  0538 04/18/25  1421   GLUCOSE mg/dL 78 180* 147* 207*   SODIUM mmol/L 137 136 132* 128*   POTASSIUM mmol/L 3.3* 3.1* 3.9 5.0   CHLORIDE mmol/L 95* 97* 97* 93*   CO2 mmol/L 37* 33* 29 31   BUN mg/dL 29* 26* 21 21   CREATININE mg/dL 0.84 0.71 0.58 0.48*   EGFR mL/min/1.73m*2 74 90 >90 >90   CALCIUM mg/dL 8.7 8.9 8.7 9.2   ALBUMIN g/dL 2.9* 2.9* 3.1* 3.3*   MAGNESIUM mg/dL 1.75 1.94  --  1.82   PHOSPHORUS mg/dL 2.6 1.6*  --  4.5     Results from last 72 hours   Lab Units 04/21/25  0810 04/20/25  0353 04/19/25  0538   ALK PHOS U/L 116 141* 182*   ALT U/L 34 44 56*   AST U/L 14 21 31   BILIRUBIN TOTAL mg/dL 0.3 0.3 0.3   PROTEIN TOTAL g/dL 5.7* 5.8* 5.8*     Results from last 72 hours   Lab Units 04/18/25  1421   TROPHS ng/L 11     Results from last 72 hours   Lab Units 04/18/25  1421   LACTATE mmol/L 0.2*     Results from last 72 hours   Lab Units 04/21/25  0810 04/20/25  0353 04/19/25  0538   WBC AUTO x10*3/uL 9.5 6.5 7.6   NRBC AUTO /100 WBCs 0.2* 0.3* 0.0   RBC AUTO x10*6/uL 2.53* 2.49* 2.53*   HEMOGLOBIN g/dL 6.9* 7.0* 7.0*   HEMATOCRIT % 22.9* 22.1* 22.6*   MCV fL 91 89 89   MCH pg 27.3 28.1 27.7   MCHC g/dL 30.1* 31.7* 31.0*   RDW % 17.2* 16.9* 16.0*   PLATELETS AUTO x10*3/uL 241 229 225     Results from last 72 hours   Lab Units 04/18/25  1923 04/18/25  1744 04/18/25  1625 04/18/25  1511 04/18/25  1509   POCT PH, ARTERIAL pH 7.30*  --   --   --  7.10*   POCT PCO2, ARTERIAL mm Hg 61*  --   --   --  105*   POCT PO2, ARTERIAL mm Hg 172*  --   --   --  333*   POCT HCO3 CALCULATED, ARTERIAL mmol/L 30.0*  " --   --   --  32.6*   POCT LACTATE, ARTERIAL mmol/L 0.5  --   --   --  <0.3*   POCT BASE EXCESS, ARTERIAL mmol/L 3.1*  --   --   --  2.0   FIO2 % 40 65 100   < > 80    < > = values in this interval not displayed.     Lab Results   Component Value Date    BLOODCULT No growth at 2 days 04/18/2025    BLOODCULT No growth at 2 days 04/18/2025     No results found for: \"URINECULTURE\"    Imaging and Diagnostic Studies     Recent imaging and diagnostic studies reviewed.       Assessment / Plan     Neuro  #Acute hypercapnic encephalopathy (CO2 narcosis)  - Mentation improved with mechanical vent support and she has been weaned to trach collar (which is her baseline).    #Insomnia  - Continue home melatonin.  - Will add a low dose of Seroquel at night PRN.    CV  #Acute on chronic diastolic CHF  #Hypertension  #Hyperlipidemia  - Still with rales on auscultation.  Continue with gentle diuresis.  There is not a diuretic on her home med list and she could probably benefit from one.  - Continue home ASA 81 mg, atorvastatin, carvedilol, losartan.    Pulm  #Acute on chronic hypercapnic respiratory failure  #COPD s/p trach  - Acute hypercapnia likely secondary to COPD and ADHF.  Required mechanical vent support.  Weaned to trach collar this AM.  Will keep in the ICU for now to see how she does over the course of the day and in case she needs to go back on mechanical vent support overnight.  She can be downgraded to step-down status, however.  - Continue nebulized steroids and bronchodilators (Pulmicort and Duonebs).  Transition to her home long-acting agents accordingly.  - Continue Solumedrol for 5 day course.  So, will DC after her dose tomorrow.    Renal  - Gentle diuresis for ADHF, as above.    #Chronic hyponatremia  - Continue home salt tabs.    #Hypokalemia, hypomagnesemia  - Replete electrolytes accordingly.    GI/Endocrine  - Continue tube feeds as ordered at [facility] (Nepro @ 40 mL/hr from 6 PM to 6 AM).  Consulted and " discussed case with dietician.  Ok to continue this regimen.  - Esomeprazole for GI prophylaxis.  - Continue course of steroids for COPD, as above.    #T2DM  - Sliding scale insulin.    Heme/ID  - Low suspicion for infectious process.  DC antibiotics.  (She completed a 3-day course of Zosyn).    #Chronic PE  - Continue home Eliquis.     #Normocytic anemia  - She is on Eliquis but hemoccult stool was negative.  Workup is negative so far.  Transfusing 1 u PRBC for Hb 6.9.    Dispo  - PT/OT      Eladio Lerma MD       [1] ammonium lactate, 1 Application, Topical, Daily  apixaban, 5 mg, g-tube, BID  aspirin, 81 mg, g-tube, Daily  atorvastatin, 40 mg, g-tube, Nightly  budesonide, 0.5 mg, nebulization, BID  carvedilol, 25 mg, g-tube, BID  citalopram, 10 mg, g-tube, Daily  esomeprazole, 20 mg, g-tube, Daily before breakfast  furosemide, 40 mg, intravenous, Once  insulin lispro, 0-10 Units, subcutaneous, q6h  ipratropium-albuteroL, 3 mL, nebulization, q4h  losartan, 100 mg, g-tube, Daily  magnesium sulfate, 2 g, intravenous, q2h  melatonin, 6 mg, oral, Nightly  methylPREDNISolone sodium succinate (PF), 40 mg, intravenous, Daily  potassium chloride, 20 mEq, oral, Once  sodium chloride, 2 g, oral, TID  [2]

## 2025-04-22 LAB
ALBUMIN SERPL BCP-MCNC: 3.1 G/DL (ref 3.4–5)
ANION GAP SERPL CALC-SCNC: 9 MMOL/L (ref 10–20)
BACTERIA BLD CULT: NORMAL
BACTERIA BLD CULT: NORMAL
BLOOD EXPIRATION DATE: NORMAL
BUN SERPL-MCNC: 24 MG/DL (ref 6–23)
CALCIUM SERPL-MCNC: 9.1 MG/DL (ref 8.6–10.3)
CHLORIDE SERPL-SCNC: 95 MMOL/L (ref 98–107)
CO2 SERPL-SCNC: 34 MMOL/L (ref 21–32)
CREAT SERPL-MCNC: 0.74 MG/DL (ref 0.5–1.05)
DISPENSE STATUS: NORMAL
EGFRCR SERPLBLD CKD-EPI 2021: 86 ML/MIN/1.73M*2
ERYTHROCYTE [DISTWIDTH] IN BLOOD BY AUTOMATED COUNT: 16.3 % (ref 11.5–14.5)
GLUCOSE BLD MANUAL STRIP-MCNC: 123 MG/DL (ref 74–99)
GLUCOSE BLD MANUAL STRIP-MCNC: 158 MG/DL (ref 74–99)
GLUCOSE BLD MANUAL STRIP-MCNC: 252 MG/DL (ref 74–99)
GLUCOSE SERPL-MCNC: 147 MG/DL (ref 74–99)
HCT VFR BLD AUTO: 31.9 % (ref 36–46)
HGB BLD-MCNC: 10.3 G/DL (ref 12–16)
HOLD SPECIMEN: NORMAL
MAGNESIUM SERPL-MCNC: 2.36 MG/DL (ref 1.6–2.4)
MCH RBC QN AUTO: 28.9 PG (ref 26–34)
MCHC RBC AUTO-ENTMCNC: 32.3 G/DL (ref 32–36)
MCV RBC AUTO: 90 FL (ref 80–100)
NRBC BLD-RTO: 0.1 /100 WBCS (ref 0–0)
PHOSPHATE SERPL-MCNC: 2.6 MG/DL (ref 2.5–4.9)
PLATELET # BLD AUTO: 273 X10*3/UL (ref 150–450)
POTASSIUM SERPL-SCNC: 3.7 MMOL/L (ref 3.5–5.3)
PRODUCT BLOOD TYPE: 5100
PRODUCT CODE: NORMAL
RBC # BLD AUTO: 3.56 X10*6/UL (ref 4–5.2)
SODIUM SERPL-SCNC: 134 MMOL/L (ref 136–145)
UNIT ABO: NORMAL
UNIT NUMBER: NORMAL
UNIT RH: NORMAL
UNIT VOLUME: 350
WBC # BLD AUTO: 14.7 X10*3/UL (ref 4.4–11.3)
XM INTEP: NORMAL

## 2025-04-22 PROCEDURE — 2500000005 HC RX 250 GENERAL PHARMACY W/O HCPCS: Performed by: ANESTHESIOLOGY

## 2025-04-22 PROCEDURE — 82947 ASSAY GLUCOSE BLOOD QUANT: CPT

## 2025-04-22 PROCEDURE — 2500000001 HC RX 250 WO HCPCS SELF ADMINISTERED DRUGS (ALT 637 FOR MEDICARE OP): Performed by: INTERNAL MEDICINE

## 2025-04-22 PROCEDURE — 2500000002 HC RX 250 W HCPCS SELF ADMINISTERED DRUGS (ALT 637 FOR MEDICARE OP, ALT 636 FOR OP/ED): Performed by: INTERNAL MEDICINE

## 2025-04-22 PROCEDURE — 2500000001 HC RX 250 WO HCPCS SELF ADMINISTERED DRUGS (ALT 637 FOR MEDICARE OP): Performed by: ANESTHESIOLOGY

## 2025-04-22 PROCEDURE — 83036 HEMOGLOBIN GLYCOSYLATED A1C: CPT | Mod: AHULAB | Performed by: INTERNAL MEDICINE

## 2025-04-22 PROCEDURE — 99233 SBSQ HOSP IP/OBS HIGH 50: CPT | Performed by: ANESTHESIOLOGY

## 2025-04-22 PROCEDURE — 2060000001 HC INTERMEDIATE ICU ROOM DAILY

## 2025-04-22 PROCEDURE — 97162 PT EVAL MOD COMPLEX 30 MIN: CPT | Mod: GP

## 2025-04-22 PROCEDURE — 94640 AIRWAY INHALATION TREATMENT: CPT

## 2025-04-22 PROCEDURE — 94664 DEMO&/EVAL PT USE INHALER: CPT

## 2025-04-22 PROCEDURE — 51701 INSERT BLADDER CATHETER: CPT

## 2025-04-22 PROCEDURE — 99222 1ST HOSP IP/OBS MODERATE 55: CPT

## 2025-04-22 PROCEDURE — 87493 C DIFF AMPLIFIED PROBE: CPT | Mod: AHULAB | Performed by: INTERNAL MEDICINE

## 2025-04-22 PROCEDURE — 85027 COMPLETE CBC AUTOMATED: CPT | Performed by: ANESTHESIOLOGY

## 2025-04-22 PROCEDURE — 80069 RENAL FUNCTION PANEL: CPT | Performed by: ANESTHESIOLOGY

## 2025-04-22 PROCEDURE — 97116 GAIT TRAINING THERAPY: CPT | Mod: GP

## 2025-04-22 PROCEDURE — 36415 COLL VENOUS BLD VENIPUNCTURE: CPT | Performed by: ANESTHESIOLOGY

## 2025-04-22 PROCEDURE — 2500000004 HC RX 250 GENERAL PHARMACY W/ HCPCS (ALT 636 FOR OP/ED): Mod: JZ | Performed by: ANESTHESIOLOGY

## 2025-04-22 PROCEDURE — 2500000002 HC RX 250 W HCPCS SELF ADMINISTERED DRUGS (ALT 637 FOR MEDICARE OP, ALT 636 FOR OP/ED): Performed by: ANESTHESIOLOGY

## 2025-04-22 PROCEDURE — 97166 OT EVAL MOD COMPLEX 45 MIN: CPT | Mod: GO

## 2025-04-22 PROCEDURE — 83735 ASSAY OF MAGNESIUM: CPT | Performed by: ANESTHESIOLOGY

## 2025-04-22 PROCEDURE — 97535 SELF CARE MNGMENT TRAINING: CPT | Mod: GO

## 2025-04-22 PROCEDURE — 2500000001 HC RX 250 WO HCPCS SELF ADMINISTERED DRUGS (ALT 637 FOR MEDICARE OP): Performed by: NURSE PRACTITIONER

## 2025-04-22 RX ORDER — TAMSULOSIN HYDROCHLORIDE 0.4 MG/1
0.4 CAPSULE ORAL DAILY
Status: DISCONTINUED | OUTPATIENT
Start: 2025-04-22 | End: 2025-04-25 | Stop reason: HOSPADM

## 2025-04-22 RX ORDER — IPRATROPIUM BROMIDE AND ALBUTEROL SULFATE 2.5; .5 MG/3ML; MG/3ML
3 SOLUTION RESPIRATORY (INHALATION)
Status: DISCONTINUED | OUTPATIENT
Start: 2025-04-22 | End: 2025-04-22

## 2025-04-22 RX ORDER — ACETAMINOPHEN 500 MG
1000 TABLET ORAL EVERY 8 HOURS PRN
COMMUNITY

## 2025-04-22 RX ORDER — POTASSIUM CHLORIDE 1.5 G/1.58G
20 POWDER, FOR SOLUTION ORAL ONCE
Status: COMPLETED | OUTPATIENT
Start: 2025-04-22 | End: 2025-04-22

## 2025-04-22 RX ORDER — TALC
6 POWDER (GRAM) TOPICAL NIGHTLY
COMMUNITY

## 2025-04-22 RX ORDER — DICYCLOMINE HYDROCHLORIDE 10 MG/1
10 CAPSULE ORAL 4 TIMES DAILY PRN
Status: DISCONTINUED | OUTPATIENT
Start: 2025-04-22 | End: 2025-04-25 | Stop reason: HOSPADM

## 2025-04-22 RX ORDER — IPRATROPIUM BROMIDE AND ALBUTEROL SULFATE 2.5; .5 MG/3ML; MG/3ML
3 SOLUTION RESPIRATORY (INHALATION) EVERY 2 HOUR PRN
Status: DISCONTINUED | OUTPATIENT
Start: 2025-04-22 | End: 2025-04-25 | Stop reason: HOSPADM

## 2025-04-22 RX ORDER — AMLODIPINE BESYLATE 5 MG/1
5 TABLET ORAL DAILY
Status: DISCONTINUED | OUTPATIENT
Start: 2025-04-22 | End: 2025-04-25 | Stop reason: HOSPADM

## 2025-04-22 RX ORDER — IPRATROPIUM BROMIDE AND ALBUTEROL SULFATE 2.5; .5 MG/3ML; MG/3ML
3 SOLUTION RESPIRATORY (INHALATION)
Status: DISCONTINUED | OUTPATIENT
Start: 2025-04-22 | End: 2025-04-25 | Stop reason: HOSPADM

## 2025-04-22 RX ORDER — FUROSEMIDE 40 MG/1
40 TABLET ORAL DAILY
Status: DISCONTINUED | OUTPATIENT
Start: 2025-04-23 | End: 2025-04-25 | Stop reason: HOSPADM

## 2025-04-22 RX ORDER — BUSPIRONE HYDROCHLORIDE 5 MG/1
2.5 TABLET ORAL EVERY 8 HOURS PRN
Status: DISCONTINUED | OUTPATIENT
Start: 2025-04-22 | End: 2025-04-25 | Stop reason: HOSPADM

## 2025-04-22 RX ORDER — FUROSEMIDE 10 MG/ML
40 INJECTION INTRAMUSCULAR; INTRAVENOUS ONCE
Status: COMPLETED | OUTPATIENT
Start: 2025-04-22 | End: 2025-04-22

## 2025-04-22 RX ADMIN — IPRATROPIUM BROMIDE AND ALBUTEROL SULFATE 3 ML: 2.5; .5 SOLUTION RESPIRATORY (INHALATION) at 19:04

## 2025-04-22 RX ADMIN — METHYLPREDNISOLONE SODIUM SUCCINATE 40 MG: 40 INJECTION, POWDER, FOR SOLUTION INTRAMUSCULAR; INTRAVENOUS at 09:37

## 2025-04-22 RX ADMIN — CARVEDILOL 25 MG: 25 TABLET, FILM COATED ORAL at 09:37

## 2025-04-22 RX ADMIN — ACETAMINOPHEN 1000 MG: 160 SOLUTION ORAL at 20:40

## 2025-04-22 RX ADMIN — SODIUM CHLORIDE 2 G: 1 TABLET ORAL at 20:40

## 2025-04-22 RX ADMIN — BUDESONIDE 0.5 MG: 0.5 INHALANT RESPIRATORY (INHALATION) at 19:04

## 2025-04-22 RX ADMIN — ACETAMINOPHEN 1000 MG: 160 SOLUTION ORAL at 02:36

## 2025-04-22 RX ADMIN — ESOMEPRAZOLE MAGNESIUM 20 MG: 40 FOR SUSPENSION ORAL at 06:35

## 2025-04-22 RX ADMIN — IPRATROPIUM BROMIDE AND ALBUTEROL SULFATE 3 ML: 2.5; .5 SOLUTION RESPIRATORY (INHALATION) at 02:37

## 2025-04-22 RX ADMIN — Medication 30 PERCENT: at 23:35

## 2025-04-22 RX ADMIN — OXYCODONE HYDROCHLORIDE 2.5 MG: 5 SOLUTION ORAL at 09:37

## 2025-04-22 RX ADMIN — ATORVASTATIN CALCIUM 40 MG: 40 TABLET, FILM COATED ORAL at 20:40

## 2025-04-22 RX ADMIN — ACETAMINOPHEN 1000 MG: 160 SOLUTION ORAL at 09:38

## 2025-04-22 RX ADMIN — Medication 30 PERCENT: at 02:37

## 2025-04-22 RX ADMIN — Medication 30 PERCENT: at 07:25

## 2025-04-22 RX ADMIN — FUROSEMIDE 40 MG: 10 INJECTION, SOLUTION INTRAMUSCULAR; INTRAVENOUS at 10:02

## 2025-04-22 RX ADMIN — INSULIN LISPRO 6 UNITS: 100 INJECTION, SOLUTION INTRAVENOUS; SUBCUTANEOUS at 17:50

## 2025-04-22 RX ADMIN — ASPIRIN 81 MG CHEWABLE TABLET 81 MG: 81 TABLET CHEWABLE at 09:37

## 2025-04-22 RX ADMIN — Medication 6 MG: at 20:40

## 2025-04-22 RX ADMIN — Medication 30 PERCENT: at 19:04

## 2025-04-22 RX ADMIN — INSULIN LISPRO 2 UNITS: 100 INJECTION, SOLUTION INTRAVENOUS; SUBCUTANEOUS at 05:07

## 2025-04-22 RX ADMIN — BUDESONIDE 0.5 MG: 0.5 INHALANT RESPIRATORY (INHALATION) at 07:25

## 2025-04-22 RX ADMIN — TAMSULOSIN HYDROCHLORIDE 0.4 MG: 0.4 CAPSULE ORAL at 09:37

## 2025-04-22 RX ADMIN — SODIUM CHLORIDE 2 G: 1 TABLET ORAL at 09:38

## 2025-04-22 RX ADMIN — Medication 1 APPLICATION: at 09:40

## 2025-04-22 RX ADMIN — POTASSIUM CHLORIDE 20 MEQ: 1.5 POWDER, FOR SOLUTION ORAL at 09:37

## 2025-04-22 RX ADMIN — APIXABAN 5 MG: 5 TABLET, FILM COATED ORAL at 09:37

## 2025-04-22 RX ADMIN — AMLODIPINE BESYLATE 5 MG: 5 TABLET ORAL at 14:13

## 2025-04-22 RX ADMIN — OXYCODONE HYDROCHLORIDE 2.5 MG: 5 SOLUTION ORAL at 16:17

## 2025-04-22 RX ADMIN — CARVEDILOL 25 MG: 25 TABLET, FILM COATED ORAL at 20:40

## 2025-04-22 RX ADMIN — APIXABAN 5 MG: 5 TABLET, FILM COATED ORAL at 20:41

## 2025-04-22 RX ADMIN — IPRATROPIUM BROMIDE AND ALBUTEROL SULFATE 3 ML: 2.5; .5 SOLUTION RESPIRATORY (INHALATION) at 07:25

## 2025-04-22 RX ADMIN — IPRATROPIUM BROMIDE AND ALBUTEROL SULFATE 3 ML: 2.5; .5 SOLUTION RESPIRATORY (INHALATION) at 15:08

## 2025-04-22 RX ADMIN — CITALOPRAM HYDROBROMIDE 10 MG: 20 TABLET ORAL at 09:37

## 2025-04-22 RX ADMIN — ACETAMINOPHEN 1000 MG: 160 SOLUTION ORAL at 14:06

## 2025-04-22 RX ADMIN — IPRATROPIUM BROMIDE AND ALBUTEROL SULFATE 3 ML: 2.5; .5 SOLUTION RESPIRATORY (INHALATION) at 11:15

## 2025-04-22 RX ADMIN — LOSARTAN POTASSIUM 100 MG: 50 TABLET, FILM COATED ORAL at 09:37

## 2025-04-22 RX ADMIN — DICYCLOMINE HYDROCHLORIDE 10 MG: 10 CAPSULE ORAL at 13:12

## 2025-04-22 RX ADMIN — OXYCODONE HYDROCHLORIDE 2.5 MG: 5 SOLUTION ORAL at 02:39

## 2025-04-22 RX ADMIN — BUSPIRONE HYDROCHLORIDE 2.5 MG: 5 TABLET ORAL at 20:40

## 2025-04-22 RX ADMIN — BUSPIRONE HYDROCHLORIDE 2.5 MG: 5 TABLET ORAL at 13:12

## 2025-04-22 RX ADMIN — SODIUM CHLORIDE 2 G: 1 TABLET ORAL at 14:13

## 2025-04-22 ASSESSMENT — ACTIVITIES OF DAILY LIVING (ADL)
ADL_ASSISTANCE: NEEDS ASSISTANCE
BATHING_ASSISTANCE: MINIMAL
BATHING_ASSISTANCE: MINIMAL
HOME_MANAGEMENT_TIME_ENTRY: 20
ADL_ASSISTANCE: NEEDS ASSISTANCE
DRESSING_ASSISTANCE: MINIMAL

## 2025-04-22 ASSESSMENT — PAIN SCALES - GENERAL
PAINLEVEL_OUTOF10: 0 - NO PAIN
PAINLEVEL_OUTOF10: 5 - MODERATE PAIN
PAINLEVEL_OUTOF10: 5 - MODERATE PAIN
PAINLEVEL_OUTOF10: 9
PAINLEVEL_OUTOF10: 0 - NO PAIN
PAINLEVEL_OUTOF10: 9
PAINLEVEL_OUTOF10: 0 - NO PAIN

## 2025-04-22 ASSESSMENT — COGNITIVE AND FUNCTIONAL STATUS - GENERAL
MOVING TO AND FROM BED TO CHAIR: A LOT
STANDING UP FROM CHAIR USING ARMS: A LITTLE
MOVING FROM LYING ON BACK TO SITTING ON SIDE OF FLAT BED WITH BEDRAILS: A LITTLE
MOVING FROM LYING ON BACK TO SITTING ON SIDE OF FLAT BED WITH BEDRAILS: A LOT
TOILETING: A LOT
DRESSING REGULAR LOWER BODY CLOTHING: A LOT
WALKING IN HOSPITAL ROOM: A LOT
MOBILITY SCORE: 12
DAILY ACTIVITIY SCORE: 14
WALKING IN HOSPITAL ROOM: TOTAL
EATING MEALS: A LITTLE
EATING MEALS: A LITTLE
CLIMB 3 TO 5 STEPS WITH RAILING: TOTAL
CLIMB 3 TO 5 STEPS WITH RAILING: TOTAL
PERSONAL GROOMING: A LITTLE
TURNING FROM BACK TO SIDE WHILE IN FLAT BAD: A LITTLE
DAILY ACTIVITIY SCORE: 11
HELP NEEDED FOR BATHING: A LOT
MOBILITY SCORE: 13
DRESSING REGULAR UPPER BODY CLOTHING: A LOT
TURNING FROM BACK TO SIDE WHILE IN FLAT BAD: A LOT
HELP NEEDED FOR BATHING: A LOT
DRESSING REGULAR UPPER BODY CLOTHING: A LOT
DRESSING REGULAR LOWER BODY CLOTHING: TOTAL
MOVING TO AND FROM BED TO CHAIR: A LITTLE
STANDING UP FROM CHAIR USING ARMS: A LOT
PERSONAL GROOMING: A LOT
TOILETING: TOTAL

## 2025-04-22 ASSESSMENT — PAIN - FUNCTIONAL ASSESSMENT
PAIN_FUNCTIONAL_ASSESSMENT: 0-10
PAIN_FUNCTIONAL_ASSESSMENT: WONG-BAKER FACES

## 2025-04-22 ASSESSMENT — PAIN SCALES - WONG BAKER: WONGBAKER_NUMERICALRESPONSE: HURTS WHOLE LOT

## 2025-04-22 ASSESSMENT — PAIN DESCRIPTION - DESCRIPTORS
DESCRIPTORS: DISCOMFORT
DESCRIPTORS: PATIENT UNABLE TO DESCRIBE

## 2025-04-22 ASSESSMENT — PAIN DESCRIPTION - LOCATION: LOCATION: ABDOMEN

## 2025-04-22 NOTE — CONSULTS
Consult Note    Patient is a 74 y.o. female with a past medical history of diabetes, hypertension, congestive heart failure, carotid stenosis, peripheral vascular disease, CVA, aortic regurgitation, pulmonary embolism-on Eliquis, COPD, chronic respiratory failure-status post tracheostomy and PEG tube, who was admitted to [facility] from a nursing home on April 18, 2025 with a change in mental status and hypoxia.  The patient reportedly was admitted to [facility] on February 14, 2025 for a COPD exacerbation, with her hospital course complicated by laryngeal injury and stridor, possibly from prolonged intubation, necessitating tracheostomy placement (with a #6 cuffless trach in place) and debridement of granulation tissue, then transferred to [facility] in early March 2025.  In the emergency room at [facility] she was noted to have a temperature of 35.7, pulse 94, respiratory rate 20, blood pressure 158/76 and oxygen saturation of 70% on room air.  She was found to have an elevated white blood count with a left shift, low hematocrit, low sodium and chloride, elevated alkaline phosphatase, AST, ALT and lactate, markedly elevated BNP of 1702, normal troponin, negative COVID-19, influenza A/B and RSV PCR, venous blood gas showing a pH of 7.08, pCO2 111 and a bicarbonate of 33, arterial blood gas on 80% FiO2 showing a pH of 7.10, pCO2 105, pO2 333, oxygen saturation 100% and a bicarbonate of 33, a mildly elevated procalcitonin level and an admission chest x-ray showing cardiomegaly with trace right pleural effusion, pulmonary edema and right basilar opacities.  The patient had her #6 cuffless tracheostomy tube changed to a #7.5 cuffed Shiley trach in the emergency room.  The patient was treated with DuoNeb, Pulmicort, Solu-Medrol, Lasix, Eliquis, magnesium sulfate, tube feeds and water flushes, and admitted to the ICU where she was placed on mechanical ventilation with assist-control  "of 18, tidal volume 400, FiO2 30% and 5 of PEEP, then changed to pressure support then 8 trach collar.  Presently, the patient feels \"fine\".  She denies fevers, chills, sweats, chest or leg pain, wheezing, postnasal drip or weight loss, but does admit to shortness of breath and dyspnea on exertion.    Past medical history: As above.    Allergies: Per EMR.    Medications: Per EMR.    Social history:  Cigarettes-smoked in the past.  Alcohol-unable to obtain.  Pets, hobbies and travel history-unable to obtain.  Occupation-worked at [id number] (formerly [employer]).    Family history: Unable to obtain.    Review of systems:  Swallowing problems-PEG tube in place.  GI-diarrhea.  -catheter in place.  Musculoskeletal-none.  Skin-no rashes.    Visit Vitals  /61   Pulse 63   Temp 36.3 °C (97.3 °F) (Temporal)   Resp 18      Physical Exam:  Gen:  Awake, alert and in NAD; on 30% trach collar with an oxygen saturation of 94%.  HEENT:  Normal conjunctivae and pharynx; no sinus tenderness.  Neck:  No thyroid enlargement or adenopathy.  Pulm: Coarse breath sounds and scattered rhonchi without wheezes or rales.    Heart:  RRR without S3, S4 or murmurs.  Abd:  Soft, non-tender and with positive bowel sounds.  Extrem:  No clubbing or edema.  Skin:  No rashes.    Scheduled medications  Scheduled Medications[1]  Continuous medications  Continuous Medications[2]  PRN medications  PRN Medications[3]     Results for orders placed or performed during the hospital encounter of 04/18/25 (from the past 96 hours)   CBC and Auto Differential   Result Value Ref Range    WBC 13.5 (H) 4.4 - 11.3 x10*3/uL    nRBC 0.4 (H) 0.0 - 0.0 /100 WBCs    RBC 2.66 (L) 4.00 - 5.20 x10*6/uL    Hemoglobin 7.3 (L) 12.0 - 16.0 g/dL    Hematocrit 24.6 (L) 36.0 - 46.0 %    MCV 93 80 - 100 fL    MCH 27.4 26.0 - 34.0 pg    MCHC 29.7 (L) 32.0 - 36.0 g/dL    RDW 16.2 (H) 11.5 - 14.5 %    Platelets 285 150 - 450 x10*3/uL    Neutrophils % 73.6 40.0 - 80.0 %    Immature Granulocytes %, " Automated 4.4 (H) 0.0 - 0.9 %    Lymphocytes % 13.7 13.0 - 44.0 %    Monocytes % 6.5 2.0 - 10.0 %    Eosinophils % 1.4 0.0 - 6.0 %    Basophils % 0.4 0.0 - 2.0 %    Neutrophils Absolute 9.91 (H) 1.60 - 5.50 x10*3/uL    Immature Granulocytes Absolute, Automated 0.59 (H) 0.00 - 0.50 x10*3/uL    Lymphocytes Absolute 1.85 0.80 - 3.00 x10*3/uL    Monocytes Absolute 0.87 (H) 0.05 - 0.80 x10*3/uL    Eosinophils Absolute 0.19 0.00 - 0.40 x10*3/uL    Basophils Absolute 0.05 0.00 - 0.10 x10*3/uL   Comprehensive Metabolic Panel   Result Value Ref Range    Glucose 207 (H) 74 - 99 mg/dL    Sodium 128 (L) 136 - 145 mmol/L    Potassium 5.0 3.5 - 5.3 mmol/L    Chloride 93 (L) 98 - 107 mmol/L    Bicarbonate 31 21 - 32 mmol/L    Anion Gap 9 (L) 10 - 20 mmol/L    Urea Nitrogen 21 6 - 23 mg/dL    Creatinine 0.48 (L) 0.50 - 1.05 mg/dL    eGFR >90 >60 mL/min/1.73m*2    Calcium 9.2 8.6 - 10.3 mg/dL    Albumin 3.3 (L) 3.4 - 5.0 g/dL    Alkaline Phosphatase 214 (H) 33 - 136 U/L    Total Protein 6.6 6.4 - 8.2 g/dL    AST 63 (H) 9 - 39 U/L    Bilirubin, Total 0.2 0.0 - 1.2 mg/dL    ALT 74 (H) 7 - 45 U/L   Lactate   Result Value Ref Range    Lactate 0.2 (L) 0.4 - 2.0 mmol/L   Blood Culture    Specimen: Peripheral Venipuncture; Blood culture   Result Value Ref Range    Blood Culture No growth at 3 days    Blood Culture    Specimen: Peripheral Venipuncture; Blood culture   Result Value Ref Range    Blood Culture No growth at 3 days    Troponin I, High Sensitivity   Result Value Ref Range    Troponin I, High Sensitivity 11 0 - 13 ng/L   Blood Gas Venous Full Panel   Result Value Ref Range    POCT pH, Venous 7.08 (LL) 7.33 - 7.43 pH    POCT pCO2, Venous 111 (HH) 41 - 51 mm Hg    POCT pO2, Venous 108 (H) 35 - 45 mm Hg    POCT SO2, Venous 99 (H) 45 - 75 %    POCT Oxy Hemoglobin, Venous 96.1 (H) 45.0 - 75.0 %    POCT Hematocrit Calculated, Venous 23.0 (L) 36.0 - 46.0 %    POCT Sodium, Venous 128 (L) 136 - 145 mmol/L    POCT Potassium, Venous 5.3 3.5  - 5.3 mmol/L    POCT Chloride, Venous 92 (L) 98 - 107 mmol/L    POCT Ionized Calicum, Venous 1.39 (H) 1.10 - 1.33 mmol/L    POCT Glucose, Venous 214 (H) 74 - 99 mg/dL    POCT Lactate, Venous <0.3 (L) 0.4 - 2.0 mmol/L    POCT Base Excess, Venous 1.9 -2.0 - 3.0 mmol/L    POCT HCO3 Calculated, Venous 32.9 (H) 22.0 - 26.0 mmol/L    POCT Hemoglobin, Venous 7.7 (L) 12.0 - 16.0 g/dL    POCT Anion Gap, Venous 8.0 (L) 10.0 - 25.0 mmol/L    Patient Temperature 37.0 degrees Celsius    FiO2 100 %   Magnesium   Result Value Ref Range    Magnesium 1.82 1.60 - 2.40 mg/dL   Phosphorus   Result Value Ref Range    Phosphorus 4.5 2.5 - 4.9 mg/dL   B-type natriuretic peptide   Result Value Ref Range    BNP 1,702 (H) 0 - 99 pg/mL   MRSA Surveillance for Vancomycin De-escalation, PCR    Specimen: Anterior Nares; Swab   Result Value Ref Range    MRSA PCR Not Detected Not Detected   Sars-CoV-2, Influenza A/B and RSV PCR   Result Value Ref Range    Coronavirus 2019, PCR Not Detected Not Detected    Flu A Result Not Detected Not Detected    Flu B Result Not Detected Not Detected    RSV PCR Not Detected Not Detected   Blood Gas Arterial Full Panel   Result Value Ref Range    POCT pH, Arterial 7.10 (LL) 7.38 - 7.42 pH    POCT pCO2, Arterial 105 (HH) 38 - 42 mm Hg    POCT pO2, Arterial 333 (H) 85 - 95 mm Hg    POCT SO2, Arterial 100 94 - 100 %    POCT Oxy Hemoglobin, Arterial 97.5 94.0 - 98.0 %    POCT Hematocrit Calculated, Arterial 23.0 (L) 36.0 - 46.0 %    POCT Sodium, Arterial 125 (L) 136 - 145 mmol/L    POCT Potassium, Arterial 5.2 3.5 - 5.3 mmol/L    POCT Chloride, Arterial 93 (L) 98 - 107 mmol/L    POCT Ionized Calcium, Arterial 1.38 (H) 1.10 - 1.33 mmol/L    POCT Glucose, Arterial 222 (H) 74 - 99 mg/dL    POCT Lactate, Arterial <0.3 (L) 0.4 - 2.0 mmol/L    POCT Base Excess, Arterial 2.0 -2.0 - 3.0 mmol/L    POCT HCO3 Calculated, Arterial 32.6 (H) 22.0 - 26.0 mmol/L    POCT Hemoglobin, Arterial 7.7 (L) 12.0 - 16.0 g/dL    POCT Anion Gap,  Arterial 5 (L) 10 - 25 mmo/L    Patient Temperature 37.0 degrees Celsius    FiO2 80 %   Blood Gas Venous Full Panel   Result Value Ref Range    POCT pH, Venous 7.17 (LL) 7.33 - 7.43 pH    POCT pCO2, Venous 84 (HH) 41 - 51 mm Hg    POCT pO2, Venous 128 (H) 35 - 45 mm Hg    POCT SO2, Venous 99 (H) 45 - 75 %    POCT Oxy Hemoglobin, Venous 96.6 (H) 45.0 - 75.0 %    POCT Hematocrit Calculated, Venous 23.0 (L) 36.0 - 46.0 %    POCT Sodium, Venous 126 (L) 136 - 145 mmol/L    POCT Potassium, Venous 5.2 3.5 - 5.3 mmol/L    POCT Chloride, Venous 93 (L) 98 - 107 mmol/L    POCT Ionized Calicum, Venous 1.34 (H) 1.10 - 1.33 mmol/L    POCT Glucose, Venous 215 (H) 74 - 99 mg/dL    POCT Lactate, Venous <0.3 (L) 0.4 - 2.0 mmol/L    POCT Base Excess, Venous 1.4 -2.0 - 3.0 mmol/L    POCT HCO3 Calculated, Venous 30.6 (H) 22.0 - 26.0 mmol/L    POCT Hemoglobin, Venous 7.6 (L) 12.0 - 16.0 g/dL    POCT Anion Gap, Venous 8.0 (L) 10.0 - 25.0 mmol/L    Patient Temperature 37.0 degrees Celsius    FiO2 80 %   Blood Gas Venous Full Panel   Result Value Ref Range    POCT pH, Venous 7.06 (LL) 7.33 - 7.43 pH    POCT pCO2, Venous 109 (HH) 41 - 51 mm Hg    POCT pO2, Venous 64 (H) 35 - 45 mm Hg    POCT SO2, Venous 90 (H) 45 - 75 %    POCT Oxy Hemoglobin, Venous 87.8 (H) 45.0 - 75.0 %    POCT Hematocrit Calculated, Venous 23.0 (L) 36.0 - 46.0 %    POCT Sodium, Venous 128 (L) 136 - 145 mmol/L    POCT Potassium, Venous 5.3 3.5 - 5.3 mmol/L    POCT Chloride, Venous 93 (L) 98 - 107 mmol/L    POCT Ionized Calicum, Venous 1.38 (H) 1.10 - 1.33 mmol/L    POCT Glucose, Venous 217 (H) 74 - 99 mg/dL    POCT Lactate, Venous <0.3 (L) 0.4 - 2.0 mmol/L    POCT Base Excess, Venous -0.1 -2.0 - 3.0 mmol/L    POCT HCO3 Calculated, Venous 30.9 (H) 22.0 - 26.0 mmol/L    POCT Hemoglobin, Venous 7.5 (L) 12.0 - 16.0 g/dL    POCT Anion Gap, Venous 9.0 (L) 10.0 - 25.0 mmol/L    Patient Temperature 37.0 degrees Celsius    FiO2 80 %   BLOOD GAS VENOUS FULL PANEL   Result Value Ref  Range    POCT pH, Venous 7.08 (LL) 7.33 - 7.43 pH    POCT pCO2, Venous 105 (HH) 41 - 51 mm Hg    POCT pO2, Venous 72 (H) 35 - 45 mm Hg    POCT SO2, Venous 95 (H) 45 - 75 %    POCT Oxy Hemoglobin, Venous 92.5 (H) 45.0 - 75.0 %    POCT Hematocrit Calculated, Venous 21.0 (L) 36.0 - 46.0 %    POCT Sodium, Venous 126 (L) 136 - 145 mmol/L    POCT Potassium, Venous 5.2 3.5 - 5.3 mmol/L    POCT Chloride, Venous 92 (L) 98 - 107 mmol/L    POCT Ionized Calicum, Venous 1.34 (H) 1.10 - 1.33 mmol/L    POCT Glucose, Venous 230 (H) 74 - 99 mg/dL    POCT Lactate, Venous <0.3 (L) 0.4 - 2.0 mmol/L    POCT Base Excess, Venous 0.5 -2.0 - 3.0 mmol/L    POCT HCO3 Calculated, Venous 31.1 (H) 22.0 - 26.0 mmol/L    POCT Hemoglobin, Venous 7.1 (L) 12.0 - 16.0 g/dL    POCT Anion Gap, Venous 8.0 (L) 10.0 - 25.0 mmol/L    Patient Temperature 37.0 degrees Celsius    FiO2 100 %   ECG 12 lead   Result Value Ref Range    Ventricular Rate 77 BPM    Atrial Rate 77 BPM    IN Interval 212 ms    QRS Duration 142 ms    QT Interval 456 ms    QTC Calculation(Bazett) 516 ms    P Axis 85 degrees    R Axis -18 degrees    T Axis 65 degrees    QRS Count 12 beats    Q Onset 225 ms    P Onset 119 ms    P Offset 179 ms    T Offset 453 ms    QTC Fredericia 495 ms   Blood Gas Venous Full Panel   Result Value Ref Range    POCT pH, Venous 7.09 (LL) 7.33 - 7.43 pH    POCT pCO2, Venous 103 (HH) 41 - 51 mm Hg    POCT pO2, Venous 45 35 - 45 mm Hg    POCT SO2, Venous 74 45 - 75 %    POCT Oxy Hemoglobin, Venous 71.8 45.0 - 75.0 %    POCT Hematocrit Calculated, Venous 21.0 (L) 36.0 - 46.0 %    POCT Sodium, Venous 124 (L) 136 - 145 mmol/L    POCT Potassium, Venous 5.4 (H) 3.5 - 5.3 mmol/L    POCT Chloride, Venous 92 (L) 98 - 107 mmol/L    POCT Ionized Calicum, Venous 1.34 (H) 1.10 - 1.33 mmol/L    POCT Glucose, Venous 268 (H) 74 - 99 mg/dL    POCT Lactate, Venous 0.4 0.4 - 2.0 mmol/L    POCT Base Excess, Venous 0.8 -2.0 - 3.0 mmol/L    POCT HCO3 Calculated, Venous 31.2 (H)  22.0 - 26.0 mmol/L    POCT Hemoglobin, Venous 7.0 (L) 12.0 - 16.0 g/dL    POCT Anion Gap, Venous 6.0 (L) 10.0 - 25.0 mmol/L    Patient Temperature 37.0 degrees Celsius    FiO2 65 %   Blood Gas Arterial Full Panel   Result Value Ref Range    POCT pH, Arterial 7.30 (L) 7.38 - 7.42 pH    POCT pCO2, Arterial 61 (H) 38 - 42 mm Hg    POCT pO2, Arterial 172 (H) 85 - 95 mm Hg    POCT SO2, Arterial 100 94 - 100 %    POCT Oxy Hemoglobin, Arterial 97.3 94.0 - 98.0 %    POCT Hematocrit Calculated, Arterial 21.0 (L) 36.0 - 46.0 %    POCT Sodium, Arterial 123 (L) 136 - 145 mmol/L    POCT Potassium, Arterial 4.9 3.5 - 5.3 mmol/L    POCT Chloride, Arterial 93 (L) 98 - 107 mmol/L    POCT Ionized Calcium, Arterial 1.27 1.10 - 1.33 mmol/L    POCT Glucose, Arterial 304 (H) 74 - 99 mg/dL    POCT Lactate, Arterial 0.5 0.4 - 2.0 mmol/L    POCT Base Excess, Arterial 3.1 (H) -2.0 - 3.0 mmol/L    POCT HCO3 Calculated, Arterial 30.0 (H) 22.0 - 26.0 mmol/L    POCT Hemoglobin, Arterial 7.1 (L) 12.0 - 16.0 g/dL    POCT Anion Gap, Arterial 5 (L) 10 - 25 mmo/L    Patient Temperature 37.0 degrees Celsius    FiO2 40 %   POCT GLUCOSE   Result Value Ref Range    POCT Glucose 251 (H) 74 - 99 mg/dL   POCT GLUCOSE   Result Value Ref Range    POCT Glucose 193 (H) 74 - 99 mg/dL   CBC and Auto Differential   Result Value Ref Range    WBC 7.6 4.4 - 11.3 x10*3/uL    nRBC 0.0 0.0 - 0.0 /100 WBCs    RBC 2.53 (L) 4.00 - 5.20 x10*6/uL    Hemoglobin 7.0 (L) 12.0 - 16.0 g/dL    Hematocrit 22.6 (L) 36.0 - 46.0 %    MCV 89 80 - 100 fL    MCH 27.7 26.0 - 34.0 pg    MCHC 31.0 (L) 32.0 - 36.0 g/dL    RDW 16.0 (H) 11.5 - 14.5 %    Platelets 225 150 - 450 x10*3/uL    Neutrophils % 80.5 40.0 - 80.0 %    Immature Granulocytes %, Automated 1.8 (H) 0.0 - 0.9 %    Lymphocytes % 13.1 13.0 - 44.0 %    Monocytes % 4.2 2.0 - 10.0 %    Eosinophils % 0.1 0.0 - 6.0 %    Basophils % 0.3 0.0 - 2.0 %    Neutrophils Absolute 6.10 (H) 1.60 - 5.50 x10*3/uL    Immature Granulocytes  Absolute, Automated 0.14 0.00 - 0.50 x10*3/uL    Lymphocytes Absolute 0.99 0.80 - 3.00 x10*3/uL    Monocytes Absolute 0.32 0.05 - 0.80 x10*3/uL    Eosinophils Absolute 0.01 0.00 - 0.40 x10*3/uL    Basophils Absolute 0.02 0.00 - 0.10 x10*3/uL   Comprehensive Metabolic Panel   Result Value Ref Range    Glucose 147 (H) 74 - 99 mg/dL    Sodium 132 (L) 136 - 145 mmol/L    Potassium 3.9 3.5 - 5.3 mmol/L    Chloride 97 (L) 98 - 107 mmol/L    Bicarbonate 29 21 - 32 mmol/L    Anion Gap 10 10 - 20 mmol/L    Urea Nitrogen 21 6 - 23 mg/dL    Creatinine 0.58 0.50 - 1.05 mg/dL    eGFR >90 >60 mL/min/1.73m*2    Calcium 8.7 8.6 - 10.3 mg/dL    Albumin 3.1 (L) 3.4 - 5.0 g/dL    Alkaline Phosphatase 182 (H) 33 - 136 U/L    Total Protein 5.8 (L) 6.4 - 8.2 g/dL    AST 31 9 - 39 U/L    Bilirubin, Total 0.3 0.0 - 1.2 mg/dL    ALT 56 (H) 7 - 45 U/L   POCT GLUCOSE   Result Value Ref Range    POCT Glucose 73 (L) 74 - 99 mg/dL   POCT GLUCOSE   Result Value Ref Range    POCT Glucose 126 (H) 74 - 99 mg/dL   POCT GLUCOSE   Result Value Ref Range    POCT Glucose 163 (H) 74 - 99 mg/dL   POCT GLUCOSE   Result Value Ref Range    POCT Glucose 151 (H) 74 - 99 mg/dL   POCT GLUCOSE   Result Value Ref Range    POCT Glucose 200 (H) 74 - 99 mg/dL   POCT GLUCOSE   Result Value Ref Range    POCT Glucose 191 (H) 74 - 99 mg/dL   CBC and Auto Differential   Result Value Ref Range    WBC 6.5 4.4 - 11.3 x10*3/uL    nRBC 0.3 (H) 0.0 - 0.0 /100 WBCs    RBC 2.49 (L) 4.00 - 5.20 x10*6/uL    Hemoglobin 7.0 (L) 12.0 - 16.0 g/dL    Hematocrit 22.1 (L) 36.0 - 46.0 %    MCV 89 80 - 100 fL    MCH 28.1 26.0 - 34.0 pg    MCHC 31.7 (L) 32.0 - 36.0 g/dL    RDW 16.9 (H) 11.5 - 14.5 %    Platelets 229 150 - 450 x10*3/uL    Neutrophils % 75.9 40.0 - 80.0 %    Immature Granulocytes %, Automated 0.6 0.0 - 0.9 %    Lymphocytes % 11.8 13.0 - 44.0 %    Monocytes % 10.0 2.0 - 10.0 %    Eosinophils % 1.1 0.0 - 6.0 %    Basophils % 0.6 0.0 - 2.0 %    Neutrophils Absolute 4.96 1.60 -  5.50 x10*3/uL    Immature Granulocytes Absolute, Automated 0.04 0.00 - 0.50 x10*3/uL    Lymphocytes Absolute 0.77 (L) 0.80 - 3.00 x10*3/uL    Monocytes Absolute 0.65 0.05 - 0.80 x10*3/uL    Eosinophils Absolute 0.07 0.00 - 0.40 x10*3/uL    Basophils Absolute 0.04 0.00 - 0.10 x10*3/uL   Comprehensive Metabolic Panel   Result Value Ref Range    Glucose 180 (H) 74 - 99 mg/dL    Sodium 136 136 - 145 mmol/L    Potassium 3.1 (L) 3.5 - 5.3 mmol/L    Chloride 97 (L) 98 - 107 mmol/L    Bicarbonate 33 (H) 21 - 32 mmol/L    Anion Gap 9 (L) 10 - 20 mmol/L    Urea Nitrogen 26 (H) 6 - 23 mg/dL    Creatinine 0.71 0.50 - 1.05 mg/dL    eGFR 90 >60 mL/min/1.73m*2    Calcium 8.9 8.6 - 10.3 mg/dL    Albumin 2.9 (L) 3.4 - 5.0 g/dL    Alkaline Phosphatase 141 (H) 33 - 136 U/L    Total Protein 5.8 (L) 6.4 - 8.2 g/dL    AST 21 9 - 39 U/L    Bilirubin, Total 0.3 0.0 - 1.2 mg/dL    ALT 44 7 - 45 U/L   Magnesium   Result Value Ref Range    Magnesium 1.94 1.60 - 2.40 mg/dL   Phosphorus   Result Value Ref Range    Phosphorus 1.6 (L) 2.5 - 4.9 mg/dL   Procalcitonin   Result Value Ref Range    Procalcitonin 0.14 (H) <=0.07 ng/mL   POCT GLUCOSE   Result Value Ref Range    POCT Glucose 123 (H) 74 - 99 mg/dL   POCT GLUCOSE   Result Value Ref Range    POCT Glucose 147 (H) 74 - 99 mg/dL   Occult Blood, Stool    Specimen: Stool   Result Value Ref Range    Occult Blood, Stool X1 Negative Negative   POCT GLUCOSE   Result Value Ref Range    POCT Glucose 227 (H) 74 - 99 mg/dL   POCT GLUCOSE   Result Value Ref Range    POCT Glucose 231 (H) 74 - 99 mg/dL   POCT GLUCOSE   Result Value Ref Range    POCT Glucose 152 (H) 74 - 99 mg/dL   CBC and Auto Differential   Result Value Ref Range    WBC 9.5 4.4 - 11.3 x10*3/uL    nRBC 0.2 (H) 0.0 - 0.0 /100 WBCs    RBC 2.53 (L) 4.00 - 5.20 x10*6/uL    Hemoglobin 6.9 (L) 12.0 - 16.0 g/dL    Hematocrit 22.9 (L) 36.0 - 46.0 %    MCV 91 80 - 100 fL    MCH 27.3 26.0 - 34.0 pg    MCHC 30.1 (L) 32.0 - 36.0 g/dL    RDW 17.2 (H)  11.5 - 14.5 %    Platelets 241 150 - 450 x10*3/uL    Neutrophils % 75.1 40.0 - 80.0 %    Immature Granulocytes %, Automated 0.5 0.0 - 0.9 %    Lymphocytes % 12.5 13.0 - 44.0 %    Monocytes % 9.8 2.0 - 10.0 %    Eosinophils % 1.7 0.0 - 6.0 %    Basophils % 0.4 0.0 - 2.0 %    Neutrophils Absolute 7.11 (H) 1.60 - 5.50 x10*3/uL    Immature Granulocytes Absolute, Automated 0.05 0.00 - 0.50 x10*3/uL    Lymphocytes Absolute 1.18 0.80 - 3.00 x10*3/uL    Monocytes Absolute 0.93 (H) 0.05 - 0.80 x10*3/uL    Eosinophils Absolute 0.16 0.00 - 0.40 x10*3/uL    Basophils Absolute 0.04 0.00 - 0.10 x10*3/uL   Comprehensive Metabolic Panel   Result Value Ref Range    Glucose 78 74 - 99 mg/dL    Sodium 137 136 - 145 mmol/L    Potassium 3.3 (L) 3.5 - 5.3 mmol/L    Chloride 95 (L) 98 - 107 mmol/L    Bicarbonate 37 (H) 21 - 32 mmol/L    Anion Gap 8 (L) 10 - 20 mmol/L    Urea Nitrogen 29 (H) 6 - 23 mg/dL    Creatinine 0.84 0.50 - 1.05 mg/dL    eGFR 74 >60 mL/min/1.73m*2    Calcium 8.7 8.6 - 10.3 mg/dL    Albumin 2.9 (L) 3.4 - 5.0 g/dL    Alkaline Phosphatase 116 33 - 136 U/L    Total Protein 5.7 (L) 6.4 - 8.2 g/dL    AST 14 9 - 39 U/L    Bilirubin, Total 0.3 0.0 - 1.2 mg/dL    ALT 34 7 - 45 U/L   Magnesium   Result Value Ref Range    Magnesium 1.75 1.60 - 2.40 mg/dL   Phosphorus   Result Value Ref Range    Phosphorus 2.6 2.5 - 4.9 mg/dL   Folate   Result Value Ref Range    Folate, Serum 14.6 >5.0 ng/mL   Vitamin B12   Result Value Ref Range    Vitamin B12 1,805 (H) 211 - 911 pg/mL   Iron and TIBC   Result Value Ref Range    Iron 48 35 - 150 ug/dL    UIBC 177 110 - 370 ug/dL    TIBC 225 (L) 240 - 445 ug/dL    % Saturation 21 (L) 25 - 45 %   Ferritin   Result Value Ref Range    Ferritin 93 8 - 150 ng/mL   Reticulocytes   Result Value Ref Range    Retic % 4.6 (H) 0.5 - 2.0 %    Retic Absolute 0.117 (H) 0.017 - 0.110 x10*6/uL    Reticulocyte Hemoglobin 21 (L) 28 - 38 pg    Immature Retic fraction 18.9 (H) <=16.0 %   Prepare RBC: 1 Units    Result Value Ref Range    PRODUCT CODE [id number]     Unit Number [id number]     Unit ABO O     Unit RH POS     XM INTEP COMP     Dispense Status IS     Blood Expiration Date 5/3/2025 11:59:00 PM EDT     PRODUCT BLOOD TYPE 5100     UNIT VOLUME 350    Type and screen   Result Value Ref Range    ABO TYPE O     Rh TYPE POS     ANTIBODY SCREEN NEG    VERIFY ABO/Rh Group Test   Result Value Ref Range    ABO TYPE O     Rh TYPE POS    SST TOP   Result Value Ref Range    Extra Tube Hold for add-ons.    POCT GLUCOSE   Result Value Ref Range    POCT Glucose 178 (H) 74 - 99 mg/dL   POCT GLUCOSE   Result Value Ref Range    POCT Glucose 222 (H) 74 - 99 mg/dL   POCT GLUCOSE   Result Value Ref Range    POCT Glucose 210 (H) 74 - 99 mg/dL   POCT GLUCOSE   Result Value Ref Range    POCT Glucose 158 (H) 74 - 99 mg/dL   Magnesium   Result Value Ref Range    Magnesium 2.36 1.60 - 2.40 mg/dL   Renal function panel   Result Value Ref Range    Glucose 147 (H) 74 - 99 mg/dL    Sodium 134 (L) 136 - 145 mmol/L    Potassium 3.7 3.5 - 5.3 mmol/L    Chloride 95 (L) 98 - 107 mmol/L    Bicarbonate 34 (H) 21 - 32 mmol/L    Anion Gap 9 (L) 10 - 20 mmol/L    Urea Nitrogen 24 (H) 6 - 23 mg/dL    Creatinine 0.74 0.50 - 1.05 mg/dL    eGFR 86 >60 mL/min/1.73m*2    Calcium 9.1 8.6 - 10.3 mg/dL    Phosphorus 2.6 2.5 - 4.9 mg/dL    Albumin 3.1 (L) 3.4 - 5.0 g/dL   CBC   Result Value Ref Range    WBC 14.7 (H) 4.4 - 11.3 x10*3/uL    nRBC 0.1 (H) 0.0 - 0.0 /100 WBCs    RBC 3.56 (L) 4.00 - 5.20 x10*6/uL    Hemoglobin 10.3 (L) 12.0 - 16.0 g/dL    Hematocrit 31.9 (L) 36.0 - 46.0 %    MCV 90 80 - 100 fL    MCH 28.9 26.0 - 34.0 pg    MCHC 32.3 32.0 - 36.0 g/dL    RDW 16.3 (H) 11.5 - 14.5 %    Platelets 273 150 - 450 x10*3/uL   SST TOP   Result Value Ref Range    Extra Tube Hold for add-ons.         Imaging  XR chest 1 view  Result Date: 4/18/2025  1. Cardiomegaly and findings of pulmonary edema. 2. Nodular densities at the right lung base measuring 2.8 cm 1.5  cm there is nonspecific and could represent areas of round atelectasis or pulmonary nodules. Nonemergent chest CT recommended for further evaluation.     MACRO: None.   Signed by: Edu Daugherty 4/18/2025 2:36 PM Dictation workstation:   [id number]      Cardiology, Vascular, and Other Imaging  ECG 12 lead  Result Date: 4/21/2025  Sinus rhythm with 1st degree AV block Right bundle branch block Abnormal ECG No previous ECGs available See ED provider note for full interpretation and clinical correlation Confirmed by Yancy Cleary ([id number]) on 4/21/2025 10:11:47 AM    Assessment:  74-year-old woman with a history of diabetes, hypertension, congestive heart failure, carotid stenosis, peripheral vascular disease, CVA, aortic regurgitation, pulmonary embolism-on Eliquis, COPD, chronic respiratory failure-status post tracheostomy and PEG tube, admitted from [facility] with a change in mental status and hypoxia, and found to have an elevated white blood count with a left shift, hyponatremia, elevated liver function tests, markedly elevated BNP (1,702) and an abnormal chest x-ray showing cardiomegaly with probable pulmonary edema, due to congestive heart failure, and leading to acute-on-chronic hypercapnic and hypoxemic respiratory failure.  Aspiration pneumonia cannot be excluded.  There was no bronchospasm at the present time.    Recommend:  1.  Continue DuoNeb, Pulmicort, Lasix, and Eliquis; there is no indication for steroids at this time.  2.  Wean FiO2 to keep oxygen saturation approximately 92 to 95% via trach collar with cuff deflated.  3.  Tracheal suction with sputum for culture.  4.  Consider CT scan of the chest without contrast.  5.  Follow-up with pulmonary medicine and ENT after discharge.  6.  The patient is DNR.    Andrew Rodriguez MD         [1] ammonium lactate, 1 Application, Topical, Daily  apixaban, 5 mg, g-tube, BID  aspirin, 81 mg, g-tube, Daily  atorvastatin, 40 mg, g-tube, Nightly  budesonide,  0.5 mg, nebulization, BID  carvedilol, 25 mg, g-tube, BID  citalopram, 10 mg, g-tube, Daily  esomeprazole, 20 mg, g-tube, Daily before breakfast  insulin lispro, 0-10 Units, subcutaneous, q6h  ipratropium-albuteroL, 3 mL, nebulization, q4h  losartan, 100 mg, g-tube, Daily  melatonin, 6 mg, oral, Nightly  sodium chloride, 2 g, oral, TID  tamsulosin, 0.4 mg, oral, Daily  [2]    [3] PRN medications: acetaminophen, dextrose, dextrose, glucagon, glucagon, hydrALAZINE, oxyCODONE, oxygen

## 2025-04-22 NOTE — PROGRESS NOTES
PROGRESS NOTE - INTERNAL MEDICINE     PATIENT NAME:  Evelyn Hutchinson    MRN:  [id number]  SERVICE DATE:  4/22/2025       ADMITTING PHYSICIAN:  Servando Cordova, DO    ASSESSMENT AND PLAN    Urinary retention, requiring [place] this admission. Failed voiding trials after 2x str cath for retention.  Acute on chronic hypoxic and hypercapnic respiratory failure, improved with a short interval mechanical ventilation support.  Pneumonia suspected, but ruled out.  Hypertension  Hx chronic PE.  On long-term Eliquis  Diarrhea, uncertain etiology.?  Antibiotics,?  GI infection. C diff in process.  Chronic insomnia  Chronic pain syndrome  Acute on chronic diastolic HF  Hyperlipidemia  Hx COPD, s/p chronic tracheostomy  Chronic hyponatremia  Anemia  Hx DM2. Hyperglycemia prob stress / steroids.  2+ aortic incompetence (I35.1).  Normal LV systolic function, unable to evaluate diastolic function due to arctic incompetence, echo 1/29/2025, [facility].  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      Plan:  Continue IV diuresis.  Monitor renal function closely.  check electrolytes and replete as needed  Pulmonary consult to coordinate ongoing respiratory management.  Cardiology consult regarding recommendations on additional management regarding the aortic incompetence and acute HF exacerbation.  Monitor for bleeding.  Accu-Cheks, SSI, hypoglycemia protocol.        DISPOSITION PLAN:      INTERVAL HISTORY OF PRESENT ILLNESS:  anxious. SOB Is better. Pain at G tube site. No chest pain/sob. No n/v/d. Tube feeds+. No new fever. acute events from last 24 hrs reviewed.    Pertinent ROS:  No abdominal pain / No Bleeding / No rashes    Discussed with nursing and case management team and the specialists involved in this patient's care. Reviewed the EMR and documentation from other care-givers.      OBJECTIVE  PHYSICAL EXAM:     GENERAL: awake, alert, resting comfortably.  Anxious. +hard of hearing.  SKIN: Skin turgor normal. No rashes  HEENT: EOMI, no epistaxis, Moist mucosa.  LUNGS: Bilat breath sounds, with no added sounds  CARDIAC: REGULAR. no rubs, no murmur  ABDOMEN: soft, non-tender. +BS.  EXTREMITIES: No edema, Good capillary refill.   NEURO: Insight fair. No invol movements. Gait not assessed  MUSCULOSKELETAL: No acute inflammation             4/22/2025     3:00 AM 4/22/2025     4:00 AM 4/22/2025     5:00 AM 4/22/2025     6:00 AM 4/22/2025     7:00 AM 4/22/2025     8:00 AM 4/22/2025     9:00 AM   Vitals   Systolic 150 153 156 155 144 158 165   Diastolic 65 61 109 57 49 53 96   BP Location  Right arm        Heart Rate 62 60 69 65 67 68 69   Temp  36.3 °C (97.3 °F)        Resp 17 13 29 16 16 31 16   Weight (lb)    105.82      BMI    17.09 kg/m2      BSA (m2)    1.49 m2        Body mass index is 17.09 kg/m².    Intake/Output Summary (Last 24 hours) at 4/22/2025 0923  Last data filed at 4/22/2025 0635  Gross per 24 hour   Intake 1086 ml   Output 950 ml   Net 136 ml         Current Medications[1]    DATA:   Diagnostic tests reviewed for today's visit:    Most recent labs  Results from last 7 days   Lab Units 04/22/25  0524 04/21/25  0810 04/20/25  0353   WBC AUTO x10*3/uL 14.7* 9.5 6.5   HEMOGLOBIN g/dL 10.3* 6.9* 7.0*   HEMATOCRIT % 31.9* 22.9* 22.1*   PLATELETS AUTO x10*3/uL 273 241 229     Results from last 7 days   Lab Units 04/22/25  0524 04/21/25  0810 04/20/25  0353 04/19/25  0538   SODIUM mmol/L 134* 137 136 132*   POTASSIUM mmol/L 3.7 3.3* 3.1* 3.9   CHLORIDE mmol/L 95* 95* 97* 97*   CO2 mmol/L 34* 37* 33* 29   BUN mg/dL 24* 29* 26* 21   CREATININE mg/dL 0.74 0.84 0.71 0.58   CALCIUM mg/dL 9.1 8.7 8.9 8.7   PROTEIN TOTAL g/dL  --  5.7* 5.8* 5.8*   BILIRUBIN TOTAL mg/dL  --  0.3 0.3 0.3   ALK PHOS U/L  --  116 141* 182*   ALT U/L  --  34 44 56*   AST U/L  --  14 21 31   GLUCOSE mg/dL 147* 78 180* 147*     Results from last 7 days   Lab Units 04/18/25  1923 04/18/25  1509    POCT PH, ARTERIAL pH 7.30* 7.10*   POCT PCO2, ARTERIAL mm Hg 61* 105*   POCT PO2, ARTERIAL mm Hg 172* 333*   POCT HCO3 CALCULATED, ARTERIAL mmol/L 30.0* 32.6*   POCT BASE EXCESS, ARTERIAL mmol/L 3.1* 2.0         Results from last 7 days   Lab Units 04/22/25  0435 04/21/25  2307 04/21/25  2028 04/21/25  1637 04/21/25  0503 04/20/25  2320 04/20/25  1721 04/20/25  1138 04/20/25  0756 04/20/25  0335 04/20/25  0009 04/19/25  1938   POCT GLUCOSE mg/dL 158* 210* 222* 178* 152* 231* 227* 147* 123* 191* 200* 151*        XR chest 1 view   Final Result   1. Cardiomegaly and findings of pulmonary edema.   2. Nodular densities at the right lung base measuring 2.8 cm 1.5 cm   there is nonspecific and could represent areas of round atelectasis   or pulmonary nodules. Nonemergent chest CT recommended for further   evaluation.             MACRO:   None.        Signed by: Edu Daugherty 4/18/2025 2:36 PM   Dictation workstation:   [id number]            SIGNATURE: Byron Carrizales MD PATIENT NAME: Evelyn Hutchinson   DATE: 4/22/2025 MRN: [id number]   TIME: 9:23 AM             [1]   Current Facility-Administered Medications:     acetaminophen (Tylenol) oral liquid 1,000 mg, 1,000 mg, g-tube, q6h PRN, Eladio Lerma MD, 1,000 mg at 04/22/25 0236    ammonium lactate (Lac-Hydrin) 12 % lotion 1 Application, 1 Application, Topical, Daily, Eladio Lerma MD, 1 Application at 04/20/25 0810    apixaban (Eliquis) tablet 5 mg, 5 mg, g-tube, BID, Eladio Lerma MD, 5 mg at 04/21/25 2039    aspirin chewable tablet 81 mg, 81 mg, g-tube, Daily, Eladio Lerma MD, 81 mg at 04/21/25 0912    atorvastatin (Lipitor) tablet 40 mg, 40 mg, g-tube, Nightly, Eladio Lerma MD, 40 mg at 04/21/25 2039    budesonide (Pulmicort) 0.5 mg/2 mL nebulizer solution 0.5 mg, 0.5 mg, nebulization, BID, Eladio Lerma MD, 0.5 mg at 04/22/25 0725    carvedilol (Coreg) tablet 25 mg, 25 mg, g-tube, BID, Eladio Lerma MD, 25 mg at 04/21/25 2039    citalopram  (CeleXA) tablet 10 mg, 10 mg, g-tube, Daily, Eladio Lerma MD, 10 mg at 04/21/25 0912    dextrose 50 % injection 12.5 g, 12.5 g, intravenous, q15 min PRN, Eladio Lerma MD    dextrose 50 % injection 25 g, 25 g, intravenous, q15 min PRN, Eladio Lerma MD    esomeprazole (NexIUM) suspension 20 mg, 20 mg, g-tube, Daily before breakfast, Eladio Lerma MD, 20 mg at 04/22/25 0635    furosemide (Lasix) injection 40 mg, 40 mg, intravenous, Once, Eladio Lerma MD    glucagon (Glucagen) injection 1 mg, 1 mg, intramuscular, q15 min PRN, Eladio Lerma MD    glucagon (Glucagen) injection 1 mg, 1 mg, intramuscular, q15 min PRN, Eladio Lerma MD    hydrALAZINE (Apresoline) injection 10 mg, 10 mg, intravenous, q4h PRN, Eladio Lerma MD, 10 mg at 04/19/25 1110    insulin lispro injection 0-10 Units, 0-10 Units, subcutaneous, q6h, Eladio Lerma MD, 2 Units at 04/22/25 0507    ipratropium-albuteroL (Duo-Neb) 0.5-2.5 mg/3 mL nebulizer solution 3 mL, 3 mL, nebulization, q4h, Eladio Lerma MD, 3 mL at 04/22/25 0725    losartan (Cozaar) tablet 100 mg, 100 mg, g-tube, Daily, Eladio Lerma MD, 100 mg at 04/21/25 0912    melatonin tablet 6 mg, 6 mg, oral, Nightly, Eladio Lerma MD, 6 mg at 04/21/25 2039    methylPREDNISolone sod succinate (SOLU-Medrol) 40 mg/mL injection 40 mg, 40 mg, intravenous, Daily, Eladio Lerma MD, 40 mg at 04/21/25 0913    oxyCODONE (Roxicodone) solution 2.5 mg, 2.5 mg, oral, q8h PRN, Eladio Lerma MD, 2.5 mg at 04/22/25 0239    oxygen (O2) therapy, , inhalation, Continuous PRN - O2/gases, Eladio Lerma MD, 30 percent at 04/22/25 0725    potassium chloride (Klor-Con) packet 20 mEq, 20 mEq, oral, Once, Eladio Lerma MD    sodium chloride tablet 2 g, 2 g, oral, TID, Eladio Lerma MD, 2 g at 04/21/25 2039    tamsulosin (Flomax) 24 hr capsule 0.4 mg, 0.4 mg, oral, Daily, Eladio Lerma MD

## 2025-04-22 NOTE — PROGRESS NOTES
Occupational Therapy    Evaluation/Treatment    Patient Name: Evelyn Hutchinson  MRN: [id number]  Department: [place] [address] ICU  [facility]  Today's Date: 04/22/25  Time Calculation  Start Time: 1005  Stop Time: 1039  Time Calculation (min): 34 min       Assessment:  OT Assessment: 74 y.o. F admitted for acute on chronic resp failure with hypercapnia, pt. would continue to benefit from OT to increase I with ADLs adn increase endurance while completing ADL/IADL tasks.  Prognosis: Good  Barriers to Discharge Home: No anticipated barriers  Evaluation/Treatment Tolerance: Patient limited by fatigue  Medical Staff Made Aware: Yes  End of Session Communication: Bedside nurse  End of Session Patient Position: Bed, 3 rail up, Alarm on  OT Assessment Results: Decreased ADL status, Decreased endurance, Decreased functional mobility  Prognosis: Good  Evaluation/Treatment Tolerance: Patient limited by fatigue  Medical Staff Made Aware: Yes  Strengths: Attitude of self, Support of Caregivers, Support of extended family/friends  Barriers to Participation: Comorbidities, Housing layout, Coping skills  Plan:  Treatment Interventions: ADL retraining, Functional transfer training, Endurance training  OT Frequency: 3 times per week  OT Discharge Recommendations: Moderate intensity level of continued care  Equipment Recommended upon Discharge: Wheeled walker  OT Recommended Transfer Status: Moderate assist  OT - OK to Discharge: Yes (Per POC)  Treatment Interventions: ADL retraining, Functional transfer training, Endurance training    Subjective   Current Problem:  1. Pneumonia of right lower lobe due to infectious organism        2. Acute on chronic respiratory failure with hypoxia and hypercapnia  General    Tracheostomy tube exchange      3. Aspiration pneumonia of right lower lobe, unspecified aspiration pneumonia type (Multi)          General:   OT Received On: 04/22/25  General  Reason for Referral: 74 y.o. F admitted for acute on  chronic resp failure wuth hypercapnia  Referred By: Eladio Lerma MD  Past Medical History Relevant to Rehab: Medical History[1]  Surgical History[2]    Family/Caregiver Present: Yes  Caregiver Feedback: daughter Angie who is pt. caregiver  Co-Treatment: PT  Co-Treatment Reason: to maximize pt. safety and participation  Prior to Session Communication: Bedside nurse  Patient Position Received: Bed, 3 rail up, Alarm on  Preferred Learning Style: kinesthetic, auditory  General Comment: Pt. chronic trach   Precautions:  Hearing/Visual Limitations: Teller  Medical Precautions: Fall precautions  Precautions Comment: FMS, r/o c-diff      Pain:  Pain Assessment  Pain Assessment: Green-Baker FACES  0-10 (Numeric) Pain Score: 9  Green-Baker FACES Pain Rating: Hurts whole lot  Pain Type: Acute pain  Pain Location: Buttocks (d/t FMS)  Pain Interventions: Repositioned    Objective   Cognition:  Orientation Level: Oriented X4  Insight: Moderate        Home Living:  Type of Home: House (most recently at SNF for rehab)  Lives With: Adult children (daughter and two grandchildren 5 and 7 years old)  Home Adaptive Equipment: Walker rolling or standard (electric wheelchair)  Home Layout: Multi-level  Home Access: Stairs to enter with rails  Entrance Stairs-Rails: None  Entrance Stairs-Number of Steps: 5-6  Bathroom Shower/Tub: Walk-in shower  Bathroom Toilet: Standard (daughter working on getting riser for toilet)  Bathroom Equipment: Grab bars in shower, Shower chair with back, Hand-held shower hose  Prior Function:  Level of Real: Needs assistance with ADLs, Needs assistance with homemaking  Receives Help From: Family  ADL Assistance: Needs assistance  Bath: Minimal  Dressing: Minimal  Homemaking Assistance: Needs assistance  Ambulatory Assistance: Needs assistance (FWW within home/short distances; longer distances use of electric wheelchair)  Transfers: Minimal  Hand Dominance: Right  IADL History:  Homemaking  Responsibilities: No  Current License:  (pt. daughter)  Mode of Transportation: Family  Occupation: Retired (worked at [employer])  ADL:  LE Dressing Assistance: Total  LE Dressing Deficit: Don/doff R sock, Don/doff L sock (adjust socks while in long sitting)  Toileting Assistance with Device: Total  Toileting Deficit: Incontinent, Fecal Management System  Functional Assistance: Minimal (x2)  Activities of Daily Living: LE Dressing  LE Dressing: Yes  Sock Level of Assistance: Dependent  LE Dressing Where Assessed: Bed level  LE Dressing Comments: Pt. required dep A to adjust/pull up socks while long sitting in bed    Toileting  Toileting Adaptive Equipment: Bowel equipment  Toileting Level of Assistance: Dependent  Where Assessed: Bed level (standing EOB)  Toileting Comments: Pt. requried dep A to complete christiano-care d/t leaking FMS. Once in standing, pt. leaking and required dep A to complete christiano-care while standing with FWW.  Activity Tolerance:  Endurance: Tolerates less than 10 min exercise, no significant change in vital signs  Early Mobility/Exercise Safety Screen: Proceed with mobilization - No exclusion criteria met    Bed Mobility/Transfers: Bed Mobility  Bed Mobility: Yes  Bed Mobility 1  Bed Mobility 1: Supine to sitting, Sitting to supine  Level of Assistance 1: Minimum assistance, +2  Bed Mobility Comments 1: Use of bed rail and drawsheet  Bed Mobility 2  Bed Mobility  2: Rolling right, Rolling left  Level of Assistance 2: Moderate assistance  Bed Mobility Comments 2: Pt.completed rolling L/R with use of Mod A to assist with completing christiano-care. Pt. unable to tolerate laying flat d/t feeling SOB.    Transfers  Transfer: Yes  Transfer 1  Technique 1: Sit to stand, Stand to sit  Transfer Device 1: Walker  Transfer Level of Assistance 1: Minimum assistance, Moderate assistance (min progressed to mod A d/t increased fatigue)  Trials/Comments 1: Pt. completed sit<.stand transfer with Min x2 A with use of  FWW. pt. demonstrated decreased upright tolerance and decreased endurance, vitals stable. Pt. stood for approx 7-8 minutes    Vision:Vision - Basic Assessment  Current Vision: Wears glasses only for reading    Hand Function:  Hand Function  Gross Grasp: Functional  Coordination: Functional  Extremities: RUE   RUE : Within Functional Limits and LUE   LUE: Within Functional Limits    Outcome Measures: [facility] Daily Activity  Putting on and taking off regular lower body clothing: Total  Bathing (including washing, rinsing, drying): A lot  Putting on and taking off regular upper body clothing: A lot  Toileting, which includes using toilet, bedpan or urinal: Total  Taking care of personal grooming such as brushing teeth: A lot  Eating Meals: A little  Daily Activity - Total Score: 11        Education Documentation  Body Mechanics, taught by Chandni Trujillo OT at 4/22/2025  1:43 PM.  Learner: Patient  Readiness: Acceptance  Method: Explanation, Demonstration  Response: Verbalizes Understanding, Demonstrated Understanding, Needs Reinforcement    Precautions, taught by Chandni Trujillo OT at 4/22/2025  1:43 PM.  Learner: Patient  Readiness: Acceptance  Method: Explanation, Demonstration  Response: Verbalizes Understanding, Demonstrated Understanding, Needs Reinforcement    ADL Training, taught by Chandni Trujillo OT at 4/22/2025  1:43 PM.  Learner: Patient  Readiness: Acceptance  Method: Explanation, Demonstration  Response: Verbalizes Understanding, Demonstrated Understanding, Needs Reinforcement    Education Comments  No comments found.        OP EDUCATION:  Education  Individual(s) Educated: Patient, Child  Education Provided: Diagnosis & Precautions, Fall precautons, Risk and benefits of OT discussed with patient or other, POC discussed and agreed upon  Risk and Benefits Discussed with Patient/Caregiver/Other: yes  Patient/Caregiver Demonstrated Understanding: yes  Plan of Care Discussed and Agreed Upon: yes  Patient  Response to Education: Patient/Caregiver Verbalized Understanding of Information, Patient/Caregiver Performed Return Demonstration of Exercises/Activities, Patient/Caregiver Asked Appropriate Questions    Goals:  Encounter Problems       Encounter Problems (Active)       Dressings Lower Extremities       LTG - Patient will demonstrate use of appropriate intervention to ensure safe dressing of lower extremities with use of reacher/sock aid       Start:  04/22/25    Expected End:  05/06/25               Functional Balance       STG - Maintains dynamic standing balance with upper extremity support to complete ADL tasks with CGA with LRAD       Start:  04/22/25    Expected End:  05/06/25       INTERVENTIONS:1. Practice standing with minimal support.2. Educate patient about standing tolerance.3. Educate patient about independence with gait, transfers, and ADL's.4. Educate patient about use of assistive device.5. Educate patient about self-directed care.            Grooming       LTG - Patient will demonstrate use of appropriate Intervention for safe grooming habits while supported sit with CGA with set up.        Start:  04/22/25    Expected End:  05/06/25               OT Transfers       LTG - Patient will demonstrate safe transfer techniques with LRAD and and Min A for safety       Start:  04/22/25    Expected End:  05/06/25               Toileting       LTG - Patient will utilize adaptive techniques/equipment to complete daily toileting tasks with use of BSC.       Start:  04/22/25    Expected End:  05/06/25                      [1]   Past Medical History:  Diagnosis Date    CHF (congestive heart failure)     COPD (chronic obstructive pulmonary disease) (Multi)     Pulmonary embolism     Stroke (Multi)    [2]   Past Surgical History:  Procedure Laterality Date    TRACHEOSTOMY TUBE PLACEMENT

## 2025-04-22 NOTE — PROGRESS NOTES
Critical Care Medicine Progress Note    Admitted on:     4/18/2025  Length of Stay: 4 day(s)     Interval History     FMS inserted overnight for ongoing diarrhea.  Also, straight cath'ed overnight for urinary retention with drainage of 600 mL urine.  24 hr UOP was ~1 L with Lasix 40 mg given yesterday.    Has remained off mechanical ventilation for > 24 hours as of this morning.  Maintaining adequate SpO2 on O2 by trach collar.  Breathing is regular and unlabored.  Still with bibasilar crackles on auscultation.    Hb 6.9 -> 10.3 after transfusion of 1 u PRBC yesterday.  WBC 9.5 -> 14.7.  No fevers.  Blood cultures 4/18 NGTD.    Plan to downgrade to stepdown unit.    Objective   Objective     Vitals:    04/22/25 0600   Weight: 48 kg (105 lb 13.1 oz)   Body mass index is 17.09 kg/m².        4/22/2025     2:37 AM 4/22/2025     3:00 AM 4/22/2025     4:00 AM 4/22/2025     5:00 AM 4/22/2025     6:00 AM 4/22/2025     7:00 AM 4/22/2025     8:00 AM   Vitals   Systolic  150 153 156 155 144 158   Diastolic  65 61 109 57 49 53   BP Location   Right arm       Heart Rate 69 62 60 69 65 67 68   Temp   36.3 °C (97.3 °F)       Resp 26 17 13 29 16 16 31   Weight (lb)     105.82     BMI     17.09 kg/m2     BSA (m2)     1.49 m2          Vent settings:       Intake/Output Summary (Last 24 hours) at 4/22/2025 0811  Last data filed at 4/22/2025 0635  Gross per 24 hour   Intake 1086 ml   Output 950 ml   Net 136 ml       Physical Exam  Neuro:  Awake and alert.  Moves all extremities.  Eyes: PERRL, clear sclerae.  CV:        Normal S1, S2.  RRR.  No m/r/g.  Resp:     Diminished with bibasilar crackles appreciated on auscultation.  GI:         +PEG tube.  +BS, abd soft, NT, ND.  +FMS draining liquid stool.  Ext: No peripheral edema.  Skin:      Warm and dry.  No rashes or lesions.    Medications     Scheduled Medications:   Scheduled Medications[1]   Continuous Medications:   Continuous Medications[2]   PRN Medications:     Labs     Results  "from last 72 hours   Lab Units 04/22/25  0524 04/21/25  0810 04/20/25  0353   GLUCOSE mg/dL 147* 78 180*   SODIUM mmol/L 134* 137 136   POTASSIUM mmol/L 3.7 3.3* 3.1*   CHLORIDE mmol/L 95* 95* 97*   CO2 mmol/L 34* 37* 33*   BUN mg/dL 24* 29* 26*   CREATININE mg/dL 0.74 0.84 0.71   EGFR mL/min/1.73m*2 86 74 90   CALCIUM mg/dL 9.1 8.7 8.9   ALBUMIN g/dL 3.1* 2.9* 2.9*   MAGNESIUM mg/dL 2.36 1.75 1.94   PHOSPHORUS mg/dL 2.6 2.6 1.6*     Results from last 72 hours   Lab Units 04/21/25  0810 04/20/25  0353   ALK PHOS U/L 116 141*   ALT U/L 34 44   AST U/L 14 21   BILIRUBIN TOTAL mg/dL 0.3 0.3   PROTEIN TOTAL g/dL 5.7* 5.8*             Results from last 72 hours   Lab Units 04/22/25  0524 04/21/25  0810 04/20/25  0353   WBC AUTO x10*3/uL 14.7* 9.5 6.5   NRBC AUTO /100 WBCs 0.1* 0.2* 0.3*   RBC AUTO x10*6/uL 3.56* 2.53* 2.49*   HEMOGLOBIN g/dL 10.3* 6.9* 7.0*   HEMATOCRIT % 31.9* 22.9* 22.1*   MCV fL 90 91 89   MCH pg 28.9 27.3 28.1   MCHC g/dL 32.3 30.1* 31.7*   RDW % 16.3* 17.2* 16.9*   PLATELETS AUTO x10*3/uL 273 241 229         Lab Results   Component Value Date    BLOODCULT No growth at 3 days 04/18/2025    BLOODCULT No growth at 3 days 04/18/2025     No results found for: \"URINECULTURE\"    Imaging and Diagnostic Studies     Recent imaging and diagnostic studies reviewed.       Assessment / Plan       Neuro  #Acute hypercapnic encephalopathy (CO2 narcosis)  - Mentation improved with mechanical vent support and she was weaned to trach collar (which is her baseline) on 4/21.    #Pain  #Insomnia  - Continue Tylenol PRN and home oxycodone PRN.  - Continue home melatonin.     CV  #Acute on chronic diastolic CHF  #Hypertension  #Hyperlipidemia  - Still with rales on auscultation.  Continue with gentle diuresis.  There is not a diuretic on her home med list and she could probably benefit from one.  - Continue home ASA 81 mg, atorvastatin, carvedilol, losartan.     Pulm  #Acute on chronic hypercapnic respiratory " failure  #COPD s/p trach  - Acute hypercapnia likely secondary to COPD and ADHF.  Required mechanical vent support and weaned to trach collar (which is her baseline) on 4/21.  - Continue nebulized steroids and bronchodilators (Pulmicort and Duonebs).  Transition to her home long-acting agents accordingly.  - Will complete a 5-day course of Solumedrol today.     Renal  - Continue gentle diuresis for ADHF, as above.    #Urinary retention  - Straight cath'ed on 4/22.  Resume home tamsulosin.     #Chronic hyponatremia  - Continue home salt tabs.     #Hypokalemia  - Supplement K accordingly.    GI/Endocrine  - Continue tube feeds as ordered at [facility] (N[place]ro @ 40 mL/hr from 6 PM to 6 AM).  Consulted and discussed case with dietician.  Ok to continue this regimen because patient is reportedly on a mechanical soft diet with thin liquids at her SNF.  Consulted speech therapy.  Recs pending.  If she doesn't pass swallow eval then transition to continuous tube feeds.  - Esomeprazole for GI prophylaxis.  - Completing course of steroids for COPD, as above.     #Diarrhea  - FMS    #T2DM  - Sliding scale insulin.     Heme/ID  - Completed a 3-day course of Zosyn.     #Chronic PE  - Continue home Eliquis.     #Normocytic anemia  - She is on Eliquis but hemoccult stool was negative.  Workup is negative so far.  Transfused 1 u PRBC on 4/21 for Hb 6.9 with increase in Hb to 10.3.     Dispo  - PT/OT  - Appropriate for transfer to stepdown unit.  D/w Dr. Colvin.      Eladio Lerma MD         [1] ammonium lactate, 1 Application, Topical, Daily  apixaban, 5 mg, g-tube, BID  aspirin, 81 mg, g-tube, Daily  atorvastatin, 40 mg, g-tube, Nightly  budesonide, 0.5 mg, nebulization, BID  carvedilol, 25 mg, g-tube, BID  citalopram, 10 mg, g-tube, Daily  esomeprazole, 20 mg, g-tube, Daily before breakfast  furosemide, 40 mg, intravenous, Once  insulin lispro, 0-10 Units, subcutaneous, q6h  ipratropium-albuteroL, 3 mL, nebulization, q4h  losartan,  100 mg, g-tube, Daily  melatonin, 6 mg, oral, Nightly  methylPREDNISolone sodium succinate (PF), 40 mg, intravenous, Daily  sodium chloride, 2 g, oral, TID  [2]

## 2025-04-22 NOTE — PROGRESS NOTES
Physical Therapy    Physical Therapy Evaluation & Treatment    Patient Name: Evelyn Hutchinson  MRN: [id number]  Department: [place] [address] ICU  [facility]  Today's Date: 4/22/2025   Time Calculation  Start Time: 1006  Stop Time: 1040  Time Calculation (min): 34 min    Assessment/Plan   PT Assessment  PT Assessment Results: Decreased strength, Decreased endurance, Impaired balance, Decreased mobility, Decreased safety awareness, Impaired hearing, Pain  Rehab Prognosis: Good  Barriers to Discharge Home: Caregiver assistance, Cognition needs, Physical needs  Caregiver Assistance: Caregiver assistance needed per identified barriers - however, level of patient's required assistance exceeds assistance available at home  Cognition Needs: 24hr supervision for safety awareness needed, Recollection or understanding of precautions/restrictions limited, Insight of patient limited regarding functional ability/needs  Physical Needs: Intermittent ADL assistance needed, Intermittent mobility assistance needed, Stair navigation into home limited by function/safety, In-home setup navigation limited by function/safety, Ambulating household distances limited by function/safety, High falls risk due to function or environment, Returning to long-term care/other facility  Evaluation/Treatment Tolerance: Patient limited by fatigue, Patient limited by pain  Medical Staff Made Aware: Yes  Strengths: Ability to acquire knowledge, Attitude of self, Insight into problems, Premorbid level of function, Support and attitude of living partners, Support of Caregivers, Support of extended family/friends  Barriers to Participation: Comorbidities, Coping skills, Housing layout  End of Session Communication: Bedside nurse  Assessment Comment: Patient seen by PT and claudio completed; presents with impaired mobility, balance, strength and activity tolerance.  She will require ongoing PT in house and following hospital stay with MOD intensity PT interventions to  maximize her safety and promote functional I.  End of Session Patient Position: Bed, 3 rail up, Alarm on   IP OR SWING BED PT PLAN  Inpatient or Swing Bed: Inpatient  PT Plan  Treatment/Interventions: Bed mobility, Transfer training, Gait training, Balance training, Neuromuscular re-education, Strengthening, Endurance training, Therapeutic exercise, Therapeutic activity, Home exercise program, Positioning, Postural re-education  PT Plan: Ongoing PT  PT Frequency: 4 times per week  PT Discharge Recommendations: Moderate intensity level of continued care  Equipment Recommended upon Discharge: Wheeled walker  PT Recommended Transfer Status: Assist x1, Assistive device  PT - OK to Discharge: Yes      Subjective     General Visit Information:  General  Reason for Referral: 74 y.o. F admitted for acute on chronic resp failure with hypercapnia, off MV to baseline trach collar, s/p 1 unit PRBC, met enceph improving; PT consult for mobility impairment  Referred By: Eladio Lerma MD  Past Medical History Relevant to Rehab:   Past Medical History:   Diagnosis Date    CHF (congestive heart failure)     COPD (chronic obstructive pulmonary disease) (Multi)     Pulmonary embolism     Stroke (Multi)       Family/Caregiver Present: Yes  Caregiver Feedback: daughter Angie who is pt. caregiver  Co-Treatment: OT  Co-Treatment Reason: to maximize pt. safety and participation  Prior to Session Communication: Bedside nurse  Patient Position Received: Bed, 3 rail up, Alarm on  Preferred Learning Style: kinesthetic, auditory  General Comment: Pt. chronic trach 30% FiO2  Home Living:  Home Living  Type of Home: House (most recently at SNF for rehab)  Lives With: Adult children (daughter and two grandchildren 5 and 7 years old)  Home Adaptive Equipment: Walker rolling or standard (electric wheelchair)  Home Layout: Multi-level  Home Access: Stairs to enter with rails  Entrance Stairs-Rails: None  Entrance Stairs-Number of Steps:  5-6  Bathroom Shower/Tub: Walk-in shower  Bathroom Toilet: Standard (daughter working on getting riser for toilet)  Bathroom Equipment: Grab bars in shower, Shower chair with back, Hand-held shower hose  Prior Level of Function:  Prior Function Per Pt/Caregiver Report  Level of [place]: Needs assistance with ADLs, Needs assistance with homemaking  Receives Help From: Family  ADL Assistance: Needs assistance  Bath: Minimal  Dressing: Minimal  Homemaking Assistance: Needs assistance  Ambulatory Assistance: Needs assistance (FWW within home/short distances; longer distances use of electric wheelchair)  Transfers: Minimal  Gait: Minimal, Assistive device  Hand Dominance: Right  Precautions:  Precautions  Hearing/Visual Limitations: [place]  Medical Precautions: Fall precautions  Precautions Comment: FMS, r/o c-diff     Date/Time Vitals Session Patient Position Pulse Resp SpO2 BP MAP (mmHg)    04/22/25 1500 --  --  81  17  98 %  161/69  97     04/22/25 1508 --  --  --  --  96 %  --  --     04/22/25 1600 --  --  94  25  83 %  --  --           Vital Signs Comment: stable throughout with all mobility.    Objective   Pain:  Pain Assessment  Pain Assessment: 0-10  0-10 (Numeric) Pain Score: 9  Pain Type: Acute pain  Pain Location: Buttocks (related to FMS - RN aware)  Pain Frequency: Intermittent  Pain Onset: Progressive  Clinical Progression: Gradually improving  Effect of Pain on Daily Activities: PT to tolerance. limited mobiltiy 2/2 ongoing pain in buttocks and anxiety demonstrated with standing  Pain Interventions: Repositioned, Ambulation/increased activity, Distraction, Emotional support, Relaxation technique, Rest  Response to Interventions: Content/relaxed, Resting quietly, Provider notified  Cognition:  Cognition  Overall Cognitive Status: Within Functional Limits  Orientation Level: Oriented X4  Insight: Moderate  Impulsive: Mildly    General Assessments:  General Observation  General Observation: patient  completed mobility near bed, limited 2/2 leaking from FMS. patient with impaired activity pia and prog fatigue     Activity Tolerance  Endurance: Decreased tolerance for upright activites, Tolerates 10 - 20 min exercise with multiple rests  Early Mobility/Exercise Safety Screen: Proceed with mobilization - No exclusion criteria met  Activity Tolerance Comments: fair    Sensation  Light Touch: No apparent deficits    Strength  Strength Comments: patient with deconditioning, grossly >3/5 strength in all extremities. fair+ trunk strength  Strength  Strength Comments: patient with deconditioning, grossly >3/5 strength in all extremities. fair+ trunk strength    Coordination  Movements are Fluid and Coordinated: Yes    Postural Control  Postural Control: Within Functional Limits    Static Sitting Balance  Static Sitting-Balance Support: Bilateral upper extremity supported, Feet supported  Static Sitting-Level of Assistance: Contact guard, Close supervision  Dynamic Sitting Balance  Dynamic Sitting-Balance Support: Bilateral upper extremity supported, Feet supported  Dynamic Sitting-Level of Assistance: Minimum assistance, Contact guard  Dynamic Sitting-Balance: Lateral lean, Forward lean, Reaching for objects, Trunk control activities    Static Standing Balance  Static Standing-Balance Support: Bilateral upper extremity supported  Static Standing-Level of Assistance: Contact guard, Minimum assistance  Static Standing-Comment/Number of Minutes: FWW  Dynamic Standing Balance  Dynamic Standing-Balance Support: Bilateral upper extremity supported  Dynamic Standing-Level of Assistance: Moderate assistance, Minimum assistance  Dynamic Standing-Balance: Lateral lean, Forward lean, Turning  Dynamic Standing-Comments: FWW  Functional Assessments:  Bed Mobility  Bed Mobility: Yes  Bed Mobility 1  Bed Mobility 1: Supine to sitting, Sitting to supine  Level of Assistance 1: Minimum assistance, +2  Bed Mobility 2  Bed Mobility  2:  Rolling right, Rolling left  Level of Assistance 2: Moderate assistance    Transfers  Transfer: Yes  Transfer 1  Technique 1: Sit to stand, Stand to sit  Transfer Device 1: Walker  Transfer Level of Assistance 1: Minimum assistance, Moderate assistance (min progressed to mod A d/t increased fatigue)    Ambulation/Gait Training  Ambulation/Gait Training Performed: Yes  Ambulation/Gait Training 1  Surface 1: Level tile  Device 1: Rolling walker  Assistance 1: Minimum assistance, Moderate assistance, Moderate verbal cues, Minimal tactile cues  Quality of Gait 1: Narrow base of support, Diminished heel strike, Inconsistent stride length, Decreased step length, Antalgic  Comments/Distance (ft) 1: 5 feet sidesteps, 5 feet forward/retro x2, stepping in place     Stairs  Stairs: No  Extremity/Trunk Assessments:  RUE   RUE : Within Functional Limits  LUE   LUE: Within Functional Limits  RLE   RLE : Within Functional Limits  LLE   LLE : Within Functional Limits  Treatments:  Bed Mobility  Bed Mobility: Yes  Bed Mobility 1  Bed Mobility 1: Supine to sitting, Sitting to supine  Level of Assistance 1: Minimum assistance, +2  Bed Mobility 2  Bed Mobility  2: Rolling right, Rolling left  Level of Assistance 2: Moderate assistance  Bed Mobility Comments 2: progressive mobiltiy with cues for positioning and sequencing    Ambulation/Gait Training  Ambulation/Gait Training Performed: Yes  Ambulation/Gait Training 1  Surface 1: Level tile  Device 1: Rolling walker  Assistance 1: Minimum assistance, Moderate assistance, Moderate verbal cues, Minimal tactile cues  Quality of Gait 1: Narrow base of support, Diminished heel strike, Inconsistent stride length, Decreased step length, Antalgic  Comments/Distance (ft) 1: 5 feet sidesteps, 5 feet forward/retro x2, stepping in place (review for postural corrections, sequencing and safety. stood ~7-8 minutes total with progressive fluctuating assistance with fatigue.)  Transfers  Transfer:  Yes  Transfer 1  Technique 1: Sit to stand, Stand to sit  Transfer Device 1: Walker  Transfer Level of Assistance 1: Minimum assistance, Moderate assistance (min progressed to mod A d/t increased fatigue)  Trials/Comments 1: review for hand placement and sequencing/safety  Outcome Measures:  [facility] Basic Mobility  Turning from your back to your side while in a flat bed without using bedrails: A lot  Moving from lying on your back to sitting on the side of a flat bed without using bedrails: A lot  Moving to and from bed to chair (including a wheelchair): A little  Standing up from a chair using your arms (e.g. wheelchair or bedside chair): A little  To walk in hospital room: A lot  Climbing 3-5 steps with railing: Total  Basic Mobility - Total Score: 13    Brief Confusion Assessment Method (bCAM)  Feature 1: Altered Mental Status or Fluctuating Course: No  CAM Result: CAM -    Prevention/Intervention  Prevention/Intervention: Early and frequent mobilization, Individualized cognitive stimulation, Familiar objects/people at bedside, Maintain normal sleep/wake cycle (blinds open during day, out of bed for meals, decrease noise), Promote hydration/nutrition, Education and support family engagement, Promote least restrictive environment, Assess for constipation     FSS-ICU  Ambulation: Walks <50 feet with any assistance x1 or walks any distance with assistance x2 people  Rolling: Moderate assistance (performs 50 - 74% of task)  Sitting: Minimal assistance (performs 75% or more of task)  Transfer Sit-to-Stand: Moderate assistance (performs 50 - 74% of task)  Transfer Supine-to-Sit: Moderate assistance (performs 50 - 74% of task)  Total Score: 14    Early Mobility/Exercise Safety Screen: Proceed with mobilization - No exclusion criteria met  ICU Mobility Scale: Marching on spot (at bedside) [6]    Encounter Problems       Encounter Problems (Active)       Balance       STG - Maintains dynamic standing balance with upper  extremity support and CGA with LRAD >10 minutes with fair+ balance and stable vitals. (Progressing)       Start:  04/22/25    Expected End:  05/06/25       INTERVENTIONS:1. Practice standing with minimal support.2. Educate patient about standing tolerance.3. Educate patient about independence with gait, transfers, and ADL's.4. Educate patient about use of assistive device.5. Educate patient about self-directed care.         Increase Tinetti CAROL score to >24/28 to indicate low risk of falling.  (Progressing)       Start:  04/22/25    Expected End:  05/06/25               Mobility       STG - Patient will ambulate >50 feet x3 with LRAD and min A, with fluid and steady reciprocal gait pattern, stable vitals. (Progressing)       Start:  04/22/25    Expected End:  05/06/25               PT Transfers       STG - Patient will perform bed mobility with min A x1 (Progressing)       Start:  04/22/25    Expected End:  05/06/25            STG - Patient will transfer sit to and from stand with CGA x1 and LRAD from all surfaces/heights consistently (Progressing)       Start:  04/22/25    Expected End:  05/06/25                   Education Documentation  Precautions, taught by Aruna Lyn PT at 4/22/2025  4:27 PM.  Learner: Family, Patient  Readiness: Acceptance  Method: Explanation, Demonstration  Response: Verbalizes Understanding, Demonstrated Understanding  Comment: PT's role and recommendations for progressive mobility, interpretation of vitals, postural corrections, bed mob/transfers/gait training,    Body Mechanics, taught by Aruna Lyn PT at 4/22/2025  4:27 PM.  Learner: Family, Patient  Readiness: Acceptance  Method: Explanation, Demonstration  Response: Verbalizes Understanding, Demonstrated Understanding  Comment: PT's role and recommendations for progressive mobility, interpretation of vitals, postural corrections, bed mob/transfers/gait training,    Mobility Training, taught by Aruna Lyn PT at  4/22/2025  4:27 PM.  Learner: Family, Patient  Readiness: Acceptance  Method: Explanation, Demonstration  Response: Verbalizes Understanding, Demonstrated Understanding  Comment: PT's role and recommendations for progressive mobility, interpretation of vitals, postural corrections, bed mob/transfers/gait training,    Education Comments  No comments found.

## 2025-04-22 NOTE — CONSULTS
Inpatient consult to Cardiology  Consult performed by: SWATHI Eugene-CNP  Consult ordered by: Byron Carrizales MD  Reason for consult: HF exac. 1/25 echo [facility] with 2+ AI normal LV systolic function ?needs addl eval/Rx      History Of Present Illness:    Evelyn Hutchinson is a 74 y.o. female with past medical history significant for Chronic HFpEF, Paroxysmal atrial fibrillation currently on Eliquis,  Moderate aortic insufficiency on TTE 1/2025, COPD, Chronic hypercapnic respiratory failure status post tracheostomy and PEG tube 2/2025 (complicated by laryngeal injury and stridor requiring debridement of granulation tissue and tracheostomy revision 4/8/2025- tracheostomy currently uncuffed at baseline),  Diabetes mellitus, Hypertension, hyperlipidemia, CVA, asthma, Pulmonary embolism 2012, Carotid stenosis. Presented with hypoxia. Cardiology is consulted for HF exac. 1/25 echo [facility] with 2+ AI normal LV systolic function ?needs addl eval/Rx.     Patient was admitted to [facility] 4/18/2025 with hypoxemia and altered mental status.  She was found to be hypoxic with pulse ox in the 70s.  Improved with supplemental oxygen.  She was found to be in acute on chronic respiratory failure with hypercapnia PCO2 105 on ABG.  ED staff was instructed to replace uncuffed tracheostomy with cuffed trach and initiate mechanical ventilator support.  She was admitted to ICU for further management. Chest x-ray showed cardiomegaly and findings suggestive of pulmonary edema and nodular densities. Initial labs High sensitivity troponin 11  BNP 1702 VBG 7.08  Bicarb 32.9 lactate 0.2   BUN 21 creatinine 0.48 albumin 3.3 ALT 74 AST 63 WBC 13.5 hemoglobin 7.3 hematocrit 24.6 platelets 285.  She was treated with IV antibiotics, Solu-Medrol and DuoNebs.  She also received gentle IV diuresis.  Mentation eventually improved and she was weaned off mechanical support to trach collar.  Hemoglobin 6.9 yesterday  requiring blood transfusion.  She was transfer out of ICU today.  Patient communicating with mouthing words.  Reports breathing feels fine at present.  Offers no complaints.      Of note, patient was admitted to [facility] 2/2025 with COPD exacerbation and acute decompensated heart failure.  She was eventually trached and PEG and [facility].  Hospital course complicated by laryngeal injury and stridor requiring debridement of granulation tissue and tracheostomy revision 4/8/2025- tracheostomy currently uncuffed at baseline).  She was recently taken off diuretics as she became hyponatremic  with sodium 115 which improved with IV saline.  She was discharged to SNF on 4/7/2025 (day prior to  admission).     Home CV meds  Eliquis 5 mg twice daily  Atorvastatin 40 mg daily  Carvedilol 25 mg twice daily  Losartan 100 mg daily           Last Recorded Vitals:  Vitals:    04/22/25 0700 04/22/25 0725 04/22/25 0800 04/22/25 0900   BP: (!) 144/49  158/53 (!) 165/96   BP Location:       Patient Position:       Pulse: 67  68 69   Resp: 16  (!) 31 16   Temp:       TempSrc:       SpO2: 100% 100% 100% 99%   Weight:       Height:           Last Labs:  LABS:  CMP:  Results from last 7 days   Lab Units 04/22/25  0524 04/21/25  0810 04/20/25  0353 04/19/25  0538 04/18/25  1421   SODIUM mmol/L 134* 137 136 132* 128*   POTASSIUM mmol/L 3.7 3.3* 3.1* 3.9 5.0   CHLORIDE mmol/L 95* 95* 97* 97* 93*   CO2 mmol/L 34* 37* 33* 29 31   ANION GAP mmol/L 9* 8* 9* 10 9*   BUN mg/dL 24* 29* 26* 21 21   CREATININE mg/dL 0.74 0.84 0.71 0.58 0.48*   EGFR mL/min/1.73m*2 86 74 90 >90 >90   MAGNESIUM mg/dL 2.36 1.75 1.94  --  1.82   ALBUMIN g/dL 3.1* 2.9* 2.9* 3.1* 3.3*   ALT U/L  --  34 44 56* 74*   AST U/L  --  14 21 31 63*   BILIRUBIN TOTAL mg/dL  --  0.3 0.3 0.3 0.2     CBC:  Results from last 7 days   Lab Units 04/22/25  0524 04/21/25  0810 04/20/25  0353 04/19/25  0538 04/18/25  1421   WBC AUTO x10*3/uL 14.7* 9.5 6.5 7.6 13.5*   HEMOGLOBIN  "g/dL 10.3* 6.9* 7.0* 7.0* 7.3*   HEMATOCRIT % 31.9* 22.9* 22.1* 22.6* 24.6*   PLATELETS AUTO x10*3/uL 273 241 229 225 285   MCV fL 90 91 89 89 93     COAG:     ABO:   ABO TYPE   Date Value Ref Range Status   04/21/2025 O  Final     HEME/ENDO:  Results from last 7 days   Lab Units 04/21/25  0810   FERRITIN ng/mL 93   IRON SATURATION % 21*      CARDIAC:   Results from last 7 days   Lab Units 04/18/25  1421   TROPHS ng/L 11   BNP pg/mL 1,702*   No results for input(s): \"CHOL\", \"LDLF\", \"LDL\", \"LDLCALC\", \"HDL\", \"TRIG\" in the last 09916 hours.    Imagine Results  XR chest 1 view   Final Result   1. Cardiomegaly and findings of pulmonary edema.   2. Nodular densities at the right lung base measuring 2.8 cm 1.5 cm   there is nonspecific and could represent areas of round atelectasis   or pulmonary nodules. Nonemergent chest CT recommended for further   evaluation.             MACRO:   None.        Signed by: Edu Daugherty 4/18/2025 2:36 PM   Dictation workstation:   [id number]            Last I/O:  I/O last 3 completed shifts:  In: 1787 (37.2 mL/kg) [Blood:360; NG/GT:1277; IV Piggyback:150]  Out: 1350 (28.1 mL/kg) [Urine:1250 (0.7 mL/kg/hr); Stool:100]  Weight: 48 kg     Past Cardiology Tests (Last 3 Years):  EKG:  ECG 12 lead 04/18/2025      Echo:  1/29/2025- [facility]  CONCLUSIONS:   - Technically difficult exam due to COPD.   - Exam indication: Shortness of Breath   - The left ventricle is normal in size. Left ventricular systolic function is  normal. EF = 60 ± 5% (visual est.) Left ventricular diastolic function was not  evaluated due to >2+ AI.   - The right ventricle is normal in size. Right ventricular systolic function is  normal.   - The left atrial cavity is mildly dilated.   - There is moderate (2+) aortic valve regurgitation.   - Agitated saline was administered to rule out shunt . There is no evidence of intracardiac shunting as detected by Doppler and agitated saline contrast (clip 90).   - The " patient has not had a prior CC echocardiographic exam for comparison.     1/21/2024- [facility]  - Technically difficult exam due to body habitus and suboptimal positioning.   - Exam indication: Shortness of Breath   - The left ventricle is normal in size. Left ventricular systolic function is normal. EF = 57 ± 5% (2D biplane)   - The right ventricle is normal in size. Right ventricular systolic function is mildly decreased.   - Aortic sclerosis with mild to moderate AI.   - The patient has not had a prior CC echocardiographic exam for comparison.     10/23/2023- [facility]    Normal LV systolic function.   The left ventricular ejection fraction (LVEF) is 70%.   Diastolic LV function assessed by resting Doppler is uncertain.   Normal RV systolic function.   Concentric left ventricular hypertrophy is present.   Mild tricuspid valve regurgitation.   Noninvasive hemodynamic assessment is consistent with mild pulmonary   hypertension (40-50 mmHg), a normal CVP, an elevated LVEDP.    10/16/2023- [facility]   Low normal LV systolic function.    The left ventricular ejection fraction (LVEF) is 55%.    Normal RV systolic function.    Mild aortic valve regurgitation.    No hemodynamically significant mitral, pulmonic, tricuspid valve disease.    Fibrocalcific changes are seen in the aortic valve.      7/27/2018- [facility]   Left ventricular systolic function is normal.    The left ventricular ejection fraction (LVEF) is 60% +/- 5%    Normal right ventricular size and function.    There is mild aortic regurgitation.    Noninvasive hemodynamic assessment is consistent with normal pulmonary  artery systolic pressure.     3/5/2016- [facility]   CONCLUSIONS:   - Technically difficult exam due to mechanical ventilation and suboptimal positioning.   - Exam indication: Congestive Heart Failure   - The left ventricle is mildly dilated. Left ventricular systolic function is moderately decreased. EF = 37 ± 5% (2D  biplane) Baseline left ventricular diastolic function was not evaluated due to fusion of E and A due to tachycardia.     The anterior wall is severely hypokinetic.The inferior wall is moderately hypokinetic.   The mass in the apsex may represent a small thrombus.A SAMSON would be helpful.   - The right ventricle is normal in size. Right ventricular systolic function is low normal.   - There is no evidence of intracardiac shunt as detected by aggitated saline study.     [facility]   The left ventricular size is small.    Left ventricular systolic function is normal.    The left ventricular ejection fraction (LVEF) is 70% +/- 5%by the single  plane summation of discs (Felix's rule) method.    There is a 4 mmHg mid-left ventricular cavity (papillary muscle)  obstruction.    Normal right ventricular size and function.    Aortic fibrocalcific (non-stenotic) changes are present and are mild.    There is mild aortic regurgitation.    Noninvasive hemodynamic assessment is consistent with normal pulmonary artery systolic pressure, a likely normal LVEDP.     [facility]   This was a limited study to assess LV wall motion.    Focally abnormal LV systolic function.    Focal LV systolic dysfunction consists of severe hypokinesia of the entire  apex.    The left ventricular ejection fraction (LVEF) is 40%.    Normal RV systolic function.    No hemodynamically significant valve disease.    Noninvasive hemodynamic assessment is consistent with mild pulmonary    hypertension (40-50 mmHg), a mildly elevated CVP, a likely normal LVEDP.    Significant changes since the prior study include new apical hypokinesis.    The study is consistent with distal LAD disease vs apical ballooning  syndrome (a.k.a. takotsubo cardiomyopathy).     4/9/2012- [facility]   Normal LV systolic function.    The left ventricular ejection fraction (LVEF) is 60%.    Normal RV systolic function.    Mild to moderate aortic valve  regurgitation.    No hemodynamically significant mitral, pulmonic, tricuspid valve disease.    The pulmonary artery systolic pressure could not be estimated.    There is no evidence of an interatrial shunt.     Cath:  No results found for this or any previous visit from the past 1095 days.    Stress Test:  No results found for this or any previous visit from the past 1095 days.    Cardiac Imaging:  No results found for this or any previous visit from the past 1095 days.      Past Medical History:  She has a past medical history of CHF (congestive heart failure), COPD (chronic obstructive pulmonary disease) (Multi), Pulmonary embolism, and Stroke (Multi).    Past Surgical History:  She has a past surgical history that includes Tracheostomy tube placement.      Social History:  She has no history on file for tobacco use, alcohol use, and drug use.    Family History:  Family History[1]     Allergies:  Patient has no known allergies.    Inpatient Medications:  Scheduled Medications[2]  PRN Medications[3]  Continuous Medications[4]  Outpatient Medications:  Current Outpatient Medications   Medication Instructions    ammonium lactate (Lac-Hydrin) 12 % lotion 1 Application, Daily    aspirin 81 mg, Daily    atorvastatin (LIPITOR) 40 mg, Nightly    Breztri Aerosphere 160-9-4.8 mcg/actuation HFA aerosol inhaler     carvedilol (COREG) 25 mg, 2 times daily    citalopram (CELEXA) 10 mg, Daily    Eliquis 5 mg, 2 times daily    fluticasone (Flonase) 50 mcg/actuation nasal spray 1 spray, Daily    HumaLOG KwikPen Insulin 100 unit/mL pen     ipratropium-albuteroL (Duo-Neb) 0.5-2.5 mg/3 mL nebulizer solution 3 mL, 4 times daily PRN    LORazepam (ATIVAN) 1 mg, oral, Every 6 hours PRN    losartan (COZAAR) 100 mg, oral, Daily    oxyCODONE (ROXICODONE) 5 mg, g-tube, Every 8 hours PRN    pantoprazole (PROTONIX) 40 mg, Daily before breakfast    sodium chloride 2 g, oral, 3 times daily    tamsulosin (FLOMAX) 0.4 mg, oral, Nightly        Physical Exam:  GENERAL: alert, cooperative, pleasant, in no acute distress  SKIN: warm, dry  NECK: Trach collar in place   CARDIAC: Regular rate and rhythm + murmur  PULMONARY: Normal respiratory efforts, lungs clear to auscultation bilaterally. Currently on trach collar 30% FIO2  ABDOMEN: soft, nondistended  EXTREMITIES: no lower extremity edema  NEURO: Alert and oriented x 3.  Grossly normal.  Moves all 4 extremities.      Assessment/Plan   Evelyn Hutchinson is a 74 y.o. female with past medical history significant for Chronic HFpEF, Paroxysmal atrial fibrillation currently on Eliquis,  Moderate aortic insufficiency on TTE 1/2025, COPD, Chronic hypercapnic respiratory failure status post tracheostomy and PEG tube 2/2025 (complicated by laryngeal injury and stridor requiring debridement of granulation tissue and tracheostomy revision 4/8/2025- tracheostomy currently uncuffed at baseline),  Diabetes mellitus, Hypertension, hyperlipidemia, CVA, asthma, Pulmonary embolism 2012, Carotid stenosis. Presented with hypoxia. Cardiology is consulted for HF exac. 1/25 echo [facility] with 2+ AI normal LV systolic function ?needs addl eval/Rx.     Home CV meds:  Eliquis 5 mg twice daily Atorvastatin 40 mg daily Carvedilol 25 mg twice daily Losartan 100 mg daily      #Acute on chronic hypoxic and hypercapnic respiratory failure  Required brief mechanical ventilation.  Has been weaned down to trach collar.  Currently on 30% FiO2    #Acute on chronic diastolic heart failure-chest x-ray with vascular congestion and BNP 1702 on admission.  Currently appears euvolemic on clinical exam.    #Moderate aortic insufficiency  -Noted on TTE 1/2025  - Recommend further BP control with amlodipine 5 mg daily    #Paroxysmal atrial fibrillation  Sinus rhythm on telemetry  Continue Eliquis 5 mg twice daily for anticoagulation    #Hypertension  Blood pressure suboptimal  Recommend adding amlodipine 5 mg daily  Continue carvedilol 25 mg twice  daily and losartan 100 mg daily    Recommendations  -Recommend discharge home with Lasix 40 mg daily and additional 40 mg as needed for weight gain.  -Patient benefits from further blood pressure management.  Adding amlodipine as above  - Outpatient echocardiogram to further assess aortic insufficiency.    Code Status:  DNR    Jeremydevinmono BISWAS Suhail, APRN-OSCAR    ==========================  Attending note  ==========================  Both the THERESE and I have had a face to face encounter with the patient today. I have examined the patient and edited the documented physical examination as necessary.  I personally reviewed the patient's recent labs, medications, orders, EKGs, and pertinent cardiac imaging.  I have reviewed the THERESE's encounter note, approve the THERESE's documentation and have edited the note to reflect the diagnostic and therapeutic plan.    74-year-old female with complex multisystem comorbidities including chronic HFpEF, paroxysmal atrial fibrillation on Eliquis, moderate aortic insufficiency (per 1/2025 TTE), COPD with chronic hypercapnic respiratory failure s/p tracheostomy and PEG (2/2025) complicated by laryngeal injury requiring trach revision (4/8/2025), DM, HTN, HLD, prior CVA, asthma, PE (2012), and carotid stenosis, admitted 4/18/2025 with acute on chronic respiratory failure and altered mental status. Cardiology consulted for heart failure exacerbation in setting of hypoxemia and volume overload, with chest X-ray showing cardiomegaly and pulmonary edema and elevated BNP (1702). TTE from 1/2025 shows preserved EF with 2+ AI; current volume status appears improved following gentle IV diuresis with subsequent clinical improvement and successful liberation from mechanical ventilation to [facility] collar. Given recent hyponatremia with prior discontinuation of diuretics and recurrent volume overload, recommend cautious resumption of loop diuretic therapy with close monitoring of electrolytes and volume  status. No evidence of acute myocardial injury (troponin 11). Plan to obtain repeat echocardiogram for interval assessment of valvular disease and ventricular filling pressures. Continue guideline-directed medical therapy for HFpEF and AFib as tolerated, including anticoagulation. Reinforce goals of care discussion in light of recurrent hospitalizations, complex trajectory, and trach dependence.    Past medical history:  As above.    Medications were reviewed.    Allergies were reviewed.    Vital signs, telemetry, medications, labs, and imaging were reviewed as well.      Physical Exam:  GENERAL: alert, cooperative, pleasant, in no acute distress  SKIN: warm, dry  NECK: Trach collar in place   CARDIAC: Regular rate and rhythm + murmur  PULMONARY: Normal respiratory efforts, lungs clear to auscultation bilaterally. Currently on trach collar 30% FIO2  ABDOMEN: soft, nondistended  EXTREMITIES: no lower extremity edema  NEURO: Alert and oriented x 3.  Grossly normal.  Moves all 4 extremities.      Assessment/Plan   Evelyn Hutchinson is a 74 y.o. female with past medical history significant for Chronic HFpEF, Paroxysmal atrial fibrillation currently on Eliquis,  Moderate aortic insufficiency on TTE 1/2025, COPD, Chronic hypercapnic respiratory failure status post tracheostomy and PEG tube 2/2025 (complicated by laryngeal injury and stridor requiring debridement of granulation tissue and tracheostomy revision 4/8/2025- tracheostomy currently uncuffed at baseline),  Diabetes mellitus, Hypertension, hyperlipidemia, CVA, asthma, Pulmonary embolism 2012, Carotid stenosis. Presented with hypoxia. Cardiology is consulted for HF exac. 1/25 echo [facility] with 2+ AI normal LV systolic function ?needs addl eval/Rx.     Home CV meds:  Eliquis 5 mg twice daily Atorvastatin 40 mg daily Carvedilol 25 mg twice daily Losartan 100 mg daily      #Acute on chronic hypoxic and hypercapnic respiratory failure  Required brief mechanical  ventilation.  Has been weaned down to trach collar.  Currently on 30% FiO2    #Acute on chronic diastolic heart failure-chest x-ray with vascular congestion and BNP 1702 on admission.  Currently appears euvolemic on clinical exam.    #Moderate aortic insufficiency  -Noted on TTE 1/2025  - Recommend further BP control with amlodipine 5 mg daily    #Paroxysmal atrial fibrillation  Sinus rhythm on telemetry  Continue Eliquis 5 mg twice daily for anticoagulation    #Hypertension  Blood pressure suboptimal  Recommend adding amlodipine 5 mg daily  Continue carvedilol 25 mg twice daily and losartan 100 mg daily    Recommendations  -Recommend discharge home with Lasix 40 mg daily and additional 40 mg as needed for weight gain.  -Patient benefits from further blood pressure management.  Adding amlodipine as above  - Outpatient echocardiogram to further assess aortic insufficiency.    Norman Briseno MD         [1] No family history on file.  [2]   Scheduled medications   Medication Dose Route Frequency    ammonium lactate  1 Application Topical Daily    apixaban  5 mg g-tube BID    aspirin  81 mg g-tube Daily    atorvastatin  40 mg g-tube Nightly    budesonide  0.5 mg nebulization BID    carvedilol  25 mg g-tube BID    citalopram  10 mg g-tube Daily    esomeprazole  20 mg g-tube Daily before breakfast    furosemide  40 mg intravenous Once    insulin lispro  0-10 Units subcutaneous q6h    ipratropium-albuteroL  3 mL nebulization q4h    losartan  100 mg g-tube Daily    melatonin  6 mg oral Nightly    sodium chloride  2 g oral TID    tamsulosin  0.4 mg oral Daily   [3]   PRN medications   Medication    acetaminophen    dextrose    dextrose    glucagon    glucagon    hydrALAZINE    oxyCODONE    oxygen   [4]   Continuous Medications   Medication Dose Last Rate

## 2025-04-22 NOTE — PROGRESS NOTES
Pharmacy Medication History     Source of Information: facility's med list    Additional concerns with the patient's PTA list.   N/a  Notified Provider via Haiku : No    The following updates were made to the Prior to Admission medication list:     Medications ADDED:   N/a  Medications CHANGED:  N/a  Medications REMOVED:   N/a  Medications NOT TAKING:   N/a    Allergy reviewed : YES    Meds 2 Beds : No    Outpatient pharmacy confirmed and updated in chart : Yes    [facility]    The list below reflectives the updated PTA list. Please review each medication in order reconciliation for additional clarification and justification.    Prior to Admission Medications   Prescriptions Last Dose Informant   Eliquis 5 mg tablet Unknown Other   Sig: Take 1 tablet (5 mg) by g-tube 2 times a day.   HumaLOG KwikPen Insulin 100 unit/mL pen Unknown Other   LORazepam (Ativan) 1 mg tablet Unknown Other   Sig: Take 1 tablet (1 mg) by mouth every 4 hours if needed for anxiety.   acetaminophen (Tylenol) 500 mg tablet Unknown Other   Sig: Take 2 tablets (1,000 mg) by mouth every 8 hours if needed for mild pain (1 - 3).   ammonium lactate (Lac-Hydrin) 12 % lotion Unknown Other   Sig: Apply 1 Application topically once daily. Apply to BLE and BUE topically one time a day for dry skin   aspirin 81 mg EC tablet Unknown Other   Sig: Take 1 tablet (81 mg) by mouth once daily.   atorvastatin (Lipitor) 40 mg tablet Unknown Other   Sig: Take 1 tablet (40 mg) by g-tube once daily at bedtime.   carvedilol (Coreg) 25 mg tablet Unknown Other   Sig: Take 1 tablet (25 mg) by g-tube 2 times a day.   citalopram (CeleXA) 10 mg tablet Unknown Other   Sig: Take 1 tablet (10 mg) by g-tube once daily.   fluticasone (Flonase) 50 mcg/actuation nasal spray Unknown Other   Sig: Administer 1 spray into each nostril once daily.   ipratropium-albuteroL (Duo-Neb) 0.5-2.5 mg/3 mL nebulizer solution Unknown Other   Sig: Take 3 mL by  nebulization 4 times a day as needed for shortness of breath or wheezing.   losartan (Cozaar) 50 mg tablet Unknown Other   Sig: Take 2 tablets (100 mg) by mouth once daily.   melatonin 3 mg tablet Unknown Other   Sig: Take 2 tablets (6 mg) by mouth once daily at bedtime.   oxyCODONE (Roxicodone) 5 mg immediate release tablet Unknown Other   Sig: Take 1 tablet (5 mg) by g-tube every 8 hours if needed for severe pain (7 - 10).   pantoprazole (ProtoNix) 40 mg EC tablet Unknown Other   Sig: Take 1 tablet (40 mg) by mouth once daily in the morning. Take before meals.   sodium chloride 1,000 mg tablet Unknown Other   Sig: Take 2 tablets (2 g) by mouth 3 times a day.   tamsulosin (Flomax) 0.4 mg 24 hr capsule Unknown Other   Sig: Take 1 capsule (0.4 mg) by mouth once daily at bedtime.      Facility-Administered Medications: None       The list below reflectives the updated allergy list. Please review each documented allergy for additional clarification and justification.    No Known Allergies       04/22/25 at 11:18 AM - [facility]ll[facility]

## 2025-04-22 NOTE — PROGRESS NOTES
04/22/25 1611   Discharge Planning   Expected Discharge Disposition SNF   Does the patient need discharge transport arranged? Yes   RoundTrip coordination needed? Yes   Has discharge transport been arranged? No          Patient is from SNF. She is chronic trach, PEG with tube feedings, SP cath, on IV Solumedrol, diuresing. Patient is on isolation for r/o c-diff. When patient is medically ready will return to [facility]. She will need new auth. Will continue to follow for discharge needs.

## 2025-04-22 NOTE — PROGRESS NOTES
"Speech-Language Pathology    Speech-Language Pathology  Adult Inpatient Clinical Bedside Swallow Evaluation    Patient Name: Evelyn Hutchinson  MRN: [id number]  Today's Date: 04/22/25   Time Calculation  Start Time: 1030  Stop Time: 1054  Time Calculation (min): 24 min        History of Present Illness: \"Evelyn Hutchinson is a 75 y/o F with a Pmhx of COPD, HFpEF, PE, AoS, inability to wean from vent support, prompting tracheostomy (uncuffed at baseline), who is admitted to the ICU from the ED on 4/18/2025 with acute on chronic respiratory failure with hypercapnia (pCO2 105).  On-going issues include the following:      Acute metabolic encephalopathy d/t acute respiratory failure with hypercapnia   Hypercapnia exacerbated by hyperoxia (paO2 > 300 mmHg)  Hypercapnia on presentation possibly d/t atypical pneumonia   Hyponatremia: chronicity is unclear   Chronic physical debilitation \"    Had FEES performed 4/14/25 and mechanical soft diet with thin liquids was recommended.      Assessment:   Clinical bedside swallow evaluation completed. Patient seated upright in bed appearing [place] prior to presentation of po trials to assess swallowing tolerance. Caregiver present at bedside. Report of daily use of PMV, however not currently in patient's possession. Patient with increased anxiety and report of SOB, therefore deferred PMV until medically cleared. Vocal quality could not be assessed due to aphonia 2/2 tracheostomy tube. Generalized OM weakness identified. Velar movement appeared could not be assessed. Dental status  edentulous. Patient given ice chips and water in varying amounts. Bolus formation slowed. Although no overt s/s aspiration identified cannot rule out silent aspiration given presence of trach tube. Given patient's change in condition requiring mechanical ventilation, would recommend completing instrumental swallowing assessment to determine safety with po diet.     Recommendations:  -NPO WITH MODIFIED BARIUM " SWALLOW STUDY  -CONTINUE PEG TUBE UNTIL INSTRUMENTAL ASSESSMENT COMPLETED.    Short Term Goal:   Patient will participate in MBSS exam to assess oropharyngeal function and rule out aspiration.    Long Term Goal:  Patient will tolerate the least restrictive diet without overt s/s aspiration or further pulmonary compromise by time of discharge.    Start Date: 4/22/25  End Date: 5/6/25  Status: Goal Initiated this date       Plan:  SLP Services Indicated: Yes  Frequency: TBD  Discussed POC with patient and caregiver  SLP - OK to Discharge? : No    Pain:   Patient exhibits no s/s of pain or discomfort during exam.       Inpatient Education:  Extensive education provided to patient and caregiver regarding current swallow function, recommendations/results, and of testing performed this date with POC.      Consultations/Referrals/Coordination of Services:   N/A

## 2025-04-23 LAB
ALBUMIN SERPL BCP-MCNC: 3.1 G/DL (ref 3.4–5)
ANION GAP SERPL CALC-SCNC: 11 MMOL/L (ref 10–20)
BASOPHILS # BLD AUTO: 0.03 X10*3/UL (ref 0–0.1)
BASOPHILS NFR BLD AUTO: 0.3 %
BUN SERPL-MCNC: 21 MG/DL (ref 6–23)
C DIF TOX TCDA+TCDB STL QL NAA+PROBE: NOT DETECTED
CALCIUM SERPL-MCNC: 9.1 MG/DL (ref 8.6–10.3)
CHLORIDE SERPL-SCNC: 94 MMOL/L (ref 98–107)
CO2 SERPL-SCNC: 34 MMOL/L (ref 21–32)
COPPER SERPL-MCNC: 120.5 UG/DL (ref 80–155)
CREAT SERPL-MCNC: 0.71 MG/DL (ref 0.5–1.05)
EGFRCR SERPLBLD CKD-EPI 2021: 90 ML/MIN/1.73M*2
EOSINOPHIL # BLD AUTO: 0.12 X10*3/UL (ref 0–0.4)
EOSINOPHIL NFR BLD AUTO: 1.2 %
ERYTHROCYTE [DISTWIDTH] IN BLOOD BY AUTOMATED COUNT: 16.1 % (ref 11.5–14.5)
EST. AVERAGE GLUCOSE BLD GHB EST-MCNC: 123 MG/DL
GLUCOSE BLD MANUAL STRIP-MCNC: 166 MG/DL (ref 74–99)
GLUCOSE BLD MANUAL STRIP-MCNC: 170 MG/DL (ref 74–99)
GLUCOSE BLD MANUAL STRIP-MCNC: 218 MG/DL (ref 74–99)
GLUCOSE BLD MANUAL STRIP-MCNC: 64 MG/DL (ref 74–99)
GLUCOSE BLD MANUAL STRIP-MCNC: 87 MG/DL (ref 74–99)
GLUCOSE SERPL-MCNC: 191 MG/DL (ref 74–99)
HBA1C MFR BLD: 5.9 % (ref ?–5.7)
HCT VFR BLD AUTO: 35.2 % (ref 36–46)
HGB BLD-MCNC: 10.7 G/DL (ref 12–16)
IMM GRANULOCYTES # BLD AUTO: 0.02 X10*3/UL (ref 0–0.5)
IMM GRANULOCYTES NFR BLD AUTO: 0.2 % (ref 0–0.9)
LYMPHOCYTES # BLD AUTO: 1.91 X10*3/UL (ref 0.8–3)
LYMPHOCYTES NFR BLD AUTO: 19.7 %
MAGNESIUM SERPL-MCNC: 2.06 MG/DL (ref 1.6–2.4)
MCH RBC QN AUTO: 27.6 PG (ref 26–34)
MCHC RBC AUTO-ENTMCNC: 30.4 G/DL (ref 32–36)
MCV RBC AUTO: 91 FL (ref 80–100)
MONOCYTES # BLD AUTO: 0.64 X10*3/UL (ref 0.05–0.8)
MONOCYTES NFR BLD AUTO: 6.6 %
NEUTROPHILS # BLD AUTO: 6.99 X10*3/UL (ref 1.6–5.5)
NEUTROPHILS NFR BLD AUTO: 72 %
NRBC BLD-RTO: 0.3 /100 WBCS (ref 0–0)
PHOSPHATE SERPL-MCNC: 2.7 MG/DL (ref 2.5–4.9)
PLATELET # BLD AUTO: 317 X10*3/UL (ref 150–450)
POTASSIUM SERPL-SCNC: 3.7 MMOL/L (ref 3.5–5.3)
RBC # BLD AUTO: 3.88 X10*6/UL (ref 4–5.2)
SODIUM SERPL-SCNC: 135 MMOL/L (ref 136–145)
WBC # BLD AUTO: 9.7 X10*3/UL (ref 4.4–11.3)

## 2025-04-23 PROCEDURE — 85025 COMPLETE CBC W/AUTO DIFF WBC: CPT | Performed by: ANESTHESIOLOGY

## 2025-04-23 PROCEDURE — 2500000002 HC RX 250 W HCPCS SELF ADMINISTERED DRUGS (ALT 637 FOR MEDICARE OP, ALT 636 FOR OP/ED): Performed by: INTERNAL MEDICINE

## 2025-04-23 PROCEDURE — 2500000001 HC RX 250 WO HCPCS SELF ADMINISTERED DRUGS (ALT 637 FOR MEDICARE OP): Performed by: ANESTHESIOLOGY

## 2025-04-23 PROCEDURE — 2500000005 HC RX 250 GENERAL PHARMACY W/O HCPCS: Performed by: ANESTHESIOLOGY

## 2025-04-23 PROCEDURE — 2500000001 HC RX 250 WO HCPCS SELF ADMINISTERED DRUGS (ALT 637 FOR MEDICARE OP): Performed by: NURSE PRACTITIONER

## 2025-04-23 PROCEDURE — 97530 THERAPEUTIC ACTIVITIES: CPT | Mod: GP,CQ

## 2025-04-23 PROCEDURE — 94640 AIRWAY INHALATION TREATMENT: CPT

## 2025-04-23 PROCEDURE — 82947 ASSAY GLUCOSE BLOOD QUANT: CPT

## 2025-04-23 PROCEDURE — 36415 COLL VENOUS BLD VENIPUNCTURE: CPT | Performed by: ANESTHESIOLOGY

## 2025-04-23 PROCEDURE — 74230 X-RAY XM SWLNG FUNCJ C+: CPT | Performed by: RADIOLOGY

## 2025-04-23 PROCEDURE — 2500000001 HC RX 250 WO HCPCS SELF ADMINISTERED DRUGS (ALT 637 FOR MEDICARE OP): Performed by: INTERNAL MEDICINE

## 2025-04-23 PROCEDURE — 80069 RENAL FUNCTION PANEL: CPT | Performed by: ANESTHESIOLOGY

## 2025-04-23 PROCEDURE — 2500000002 HC RX 250 W HCPCS SELF ADMINISTERED DRUGS (ALT 637 FOR MEDICARE OP, ALT 636 FOR OP/ED): Performed by: ANESTHESIOLOGY

## 2025-04-23 PROCEDURE — 99232 SBSQ HOSP IP/OBS MODERATE 35: CPT | Performed by: NURSE PRACTITIONER

## 2025-04-23 PROCEDURE — 92523 SPEECH SOUND LANG COMPREHEN: CPT | Mod: GN | Performed by: SPEECH-LANGUAGE PATHOLOGIST

## 2025-04-23 PROCEDURE — 83735 ASSAY OF MAGNESIUM: CPT | Performed by: ANESTHESIOLOGY

## 2025-04-23 PROCEDURE — 97535 SELF CARE MNGMENT TRAINING: CPT | Mod: GO

## 2025-04-23 PROCEDURE — 2500000005 HC RX 250 GENERAL PHARMACY W/O HCPCS: Performed by: INTERNAL MEDICINE

## 2025-04-23 PROCEDURE — 1100000001 HC PRIVATE ROOM DAILY

## 2025-04-23 RX ORDER — LOPERAMIDE HYDROCHLORIDE 2 MG/1
2 CAPSULE ORAL 3 TIMES DAILY PRN
Status: DISCONTINUED | OUTPATIENT
Start: 2025-04-23 | End: 2025-04-25 | Stop reason: HOSPADM

## 2025-04-23 RX ADMIN — BARIUM SULFATE 30 ML: 400 SUSPENSION ORAL at 12:39

## 2025-04-23 RX ADMIN — Medication 6 MG: at 20:49

## 2025-04-23 RX ADMIN — Medication 30 PERCENT: at 23:35

## 2025-04-23 RX ADMIN — INSULIN LISPRO 6 UNITS: 100 INJECTION, SOLUTION INTRAVENOUS; SUBCUTANEOUS at 18:10

## 2025-04-23 RX ADMIN — ACETAMINOPHEN 1000 MG: 160 SOLUTION ORAL at 14:39

## 2025-04-23 RX ADMIN — ACETAMINOPHEN 1000 MG: 160 SOLUTION ORAL at 04:48

## 2025-04-23 RX ADMIN — BUDESONIDE 0.5 MG: 0.5 INHALANT RESPIRATORY (INHALATION) at 18:26

## 2025-04-23 RX ADMIN — AMLODIPINE BESYLATE 5 MG: 5 TABLET ORAL at 08:18

## 2025-04-23 RX ADMIN — Medication 30 PERCENT: at 07:21

## 2025-04-23 RX ADMIN — Medication 1 APPLICATION: at 08:19

## 2025-04-23 RX ADMIN — IPRATROPIUM BROMIDE AND ALBUTEROL SULFATE 3 ML: 2.5; .5 SOLUTION RESPIRATORY (INHALATION) at 07:21

## 2025-04-23 RX ADMIN — OXYCODONE HYDROCHLORIDE 2.5 MG: 5 SOLUTION ORAL at 11:04

## 2025-04-23 RX ADMIN — CARVEDILOL 25 MG: 25 TABLET, FILM COATED ORAL at 08:17

## 2025-04-23 RX ADMIN — FUROSEMIDE 40 MG: 40 TABLET ORAL at 08:18

## 2025-04-23 RX ADMIN — IPRATROPIUM BROMIDE AND ALBUTEROL SULFATE 3 ML: 2.5; .5 SOLUTION RESPIRATORY (INHALATION) at 14:31

## 2025-04-23 RX ADMIN — BUDESONIDE 0.5 MG: 0.5 INHALANT RESPIRATORY (INHALATION) at 07:21

## 2025-04-23 RX ADMIN — Medication 30 PERCENT: at 03:14

## 2025-04-23 RX ADMIN — INSULIN LISPRO 4 UNITS: 100 INJECTION, SOLUTION INTRAVENOUS; SUBCUTANEOUS at 06:14

## 2025-04-23 RX ADMIN — ASPIRIN 81 MG CHEWABLE TABLET 81 MG: 81 TABLET CHEWABLE at 08:18

## 2025-04-23 RX ADMIN — LOSARTAN POTASSIUM 100 MG: 50 TABLET, FILM COATED ORAL at 08:17

## 2025-04-23 RX ADMIN — BUSPIRONE HYDROCHLORIDE 2.5 MG: 5 TABLET ORAL at 15:00

## 2025-04-23 RX ADMIN — DEXTROSE MONOHYDRATE 12.5 G: 25 INJECTION, SOLUTION INTRAVENOUS at 11:13

## 2025-04-23 RX ADMIN — ESOMEPRAZOLE MAGNESIUM 20 MG: 40 FOR SUSPENSION ORAL at 06:07

## 2025-04-23 RX ADMIN — CITALOPRAM HYDROBROMIDE 10 MG: 20 TABLET ORAL at 08:18

## 2025-04-23 RX ADMIN — Medication 30 PERCENT: at 18:26

## 2025-04-23 RX ADMIN — ACETAMINOPHEN 1000 MG: 160 SOLUTION ORAL at 20:49

## 2025-04-23 RX ADMIN — INSULIN LISPRO 2 UNITS: 100 INJECTION, SOLUTION INTRAVENOUS; SUBCUTANEOUS at 00:11

## 2025-04-23 RX ADMIN — OXYCODONE HYDROCHLORIDE 2.5 MG: 5 SOLUTION ORAL at 00:03

## 2025-04-23 RX ADMIN — BARIUM SULFATE 10 ML: 400 PASTE ORAL at 12:38

## 2025-04-23 RX ADMIN — APIXABAN 5 MG: 5 TABLET, FILM COATED ORAL at 20:49

## 2025-04-23 RX ADMIN — SODIUM CHLORIDE 2 G: 1 TABLET ORAL at 15:00

## 2025-04-23 RX ADMIN — CARVEDILOL 25 MG: 25 TABLET, FILM COATED ORAL at 20:49

## 2025-04-23 RX ADMIN — SODIUM CHLORIDE 2 G: 1 TABLET ORAL at 08:17

## 2025-04-23 RX ADMIN — BARIUM SULFATE 80 ML: 0.81 POWDER, FOR SUSPENSION ORAL at 12:39

## 2025-04-23 RX ADMIN — IPRATROPIUM BROMIDE AND ALBUTEROL SULFATE 3 ML: 2.5; .5 SOLUTION RESPIRATORY (INHALATION) at 18:26

## 2025-04-23 RX ADMIN — SODIUM CHLORIDE 2 G: 1 TABLET ORAL at 20:49

## 2025-04-23 RX ADMIN — IPRATROPIUM BROMIDE AND ALBUTEROL SULFATE 3 ML: 2.5; .5 SOLUTION RESPIRATORY (INHALATION) at 21:41

## 2025-04-23 RX ADMIN — Medication 30 PERCENT: at 21:41

## 2025-04-23 RX ADMIN — OXYCODONE HYDROCHLORIDE 2.5 MG: 5 SOLUTION ORAL at 18:54

## 2025-04-23 RX ADMIN — ATORVASTATIN CALCIUM 40 MG: 40 TABLET, FILM COATED ORAL at 20:49

## 2025-04-23 RX ADMIN — APIXABAN 5 MG: 5 TABLET, FILM COATED ORAL at 08:18

## 2025-04-23 ASSESSMENT — PAIN - FUNCTIONAL ASSESSMENT
PAIN_FUNCTIONAL_ASSESSMENT: 0-10
PAIN_FUNCTIONAL_ASSESSMENT: UNABLE TO SELF-REPORT
PAIN_FUNCTIONAL_ASSESSMENT: 0-10

## 2025-04-23 ASSESSMENT — PAIN DESCRIPTION - DESCRIPTORS
DESCRIPTORS: ACHING
DESCRIPTORS: DISCOMFORT
DESCRIPTORS: DISCOMFORT
DESCRIPTORS: ACHING

## 2025-04-23 ASSESSMENT — COGNITIVE AND FUNCTIONAL STATUS - GENERAL
MOVING TO AND FROM BED TO CHAIR: A LOT
TOILETING: A LOT
TOILETING: A LOT
CLIMB 3 TO 5 STEPS WITH RAILING: TOTAL
MOBILITY SCORE: 15
EATING MEALS: A LITTLE
PERSONAL GROOMING: A LITTLE
STANDING UP FROM CHAIR USING ARMS: A LOT
TOILETING: A LOT
DRESSING REGULAR LOWER BODY CLOTHING: A LOT
TURNING FROM BACK TO SIDE WHILE IN FLAT BAD: A LITTLE
WALKING IN HOSPITAL ROOM: TOTAL
DRESSING REGULAR LOWER BODY CLOTHING: A LOT
TOILETING: TOTAL
EATING MEALS: A LITTLE
PERSONAL GROOMING: TOTAL
DRESSING REGULAR LOWER BODY CLOTHING: A LOT
MOVING FROM LYING ON BACK TO SITTING ON SIDE OF FLAT BED WITH BEDRAILS: A LITTLE
HELP NEEDED FOR BATHING: A LOT
MOVING FROM LYING ON BACK TO SITTING ON SIDE OF FLAT BED WITH BEDRAILS: A LITTLE
EATING MEALS: TOTAL
DAILY ACTIVITIY SCORE: 8
CLIMB 3 TO 5 STEPS WITH RAILING: A LOT
MOVING FROM LYING ON BACK TO SITTING ON SIDE OF FLAT BED WITH BEDRAILS: A LITTLE
CLIMB 3 TO 5 STEPS WITH RAILING: TOTAL
MOVING TO AND FROM BED TO CHAIR: A LOT
HELP NEEDED FOR BATHING: A LITTLE
TURNING FROM BACK TO SIDE WHILE IN FLAT BAD: A LITTLE
PERSONAL GROOMING: A LITTLE
HELP NEEDED FOR BATHING: A LOT
EATING MEALS: A LITTLE
DRESSING REGULAR UPPER BODY CLOTHING: A LOT
TURNING FROM BACK TO SIDE WHILE IN FLAT BAD: A LITTLE
STANDING UP FROM CHAIR USING ARMS: A LOT
TURNING FROM BACK TO SIDE WHILE IN FLAT BAD: A LITTLE
WALKING IN HOSPITAL ROOM: TOTAL
MOVING FROM LYING ON BACK TO SITTING ON SIDE OF FLAT BED WITH BEDRAILS: A LITTLE
STANDING UP FROM CHAIR USING ARMS: A LITTLE
CLIMB 3 TO 5 STEPS WITH RAILING: TOTAL
MOVING TO AND FROM BED TO CHAIR: A LITTLE
MOVING TO AND FROM BED TO CHAIR: A LITTLE
DRESSING REGULAR UPPER BODY CLOTHING: A LOT
DRESSING REGULAR UPPER BODY CLOTHING: A LITTLE
DAILY ACTIVITIY SCORE: 16
WALKING IN HOSPITAL ROOM: A LOT
DRESSING REGULAR UPPER BODY CLOTHING: TOTAL
PERSONAL GROOMING: A LITTLE
DRESSING REGULAR LOWER BODY CLOTHING: A LOT
HELP NEEDED FOR BATHING: A LOT
STANDING UP FROM CHAIR USING ARMS: A LOT
MOBILITY SCORE: 15
WALKING IN HOSPITAL ROOM: A LOT

## 2025-04-23 ASSESSMENT — PAIN SCALES - GENERAL
PAINLEVEL_OUTOF10: 0 - NO PAIN
PAINLEVEL_OUTOF10: 8
PAINLEVEL_OUTOF10: 3
PAINLEVEL_OUTOF10: 9
PAINLEVEL_OUTOF10: 0 - NO PAIN
PAINLEVEL_OUTOF10: 4
PAINLEVEL_OUTOF10: 0 - NO PAIN
PAINLEVEL_OUTOF10: 0 - NO PAIN
PAINLEVEL_OUTOF10: 5 - MODERATE PAIN

## 2025-04-23 ASSESSMENT — ACTIVITIES OF DAILY LIVING (ADL): HOME_MANAGEMENT_TIME_ENTRY: 16

## 2025-04-23 NOTE — PROGRESS NOTES
04/23/25 1111   Discharge Planning   Expected Discharge Disposition SNF  ([facility])   Does the patient need discharge transport arranged? Yes   RoundTrip coordination needed? Yes   Has discharge transport been arranged? No         Patient has chronic trach, PEG with tube feeding at night time. She is scheduled for MBS this morning. She was eating during day time at the facility. Patient's c-diff came back negative. Requested from discharge team to start  auth. Will continue to follow for discharge needs.

## 2025-04-23 NOTE — PROGRESS NOTES
"Speech-Language Pathology  Adult Inpatient External Speaking Valve Placement (PMSV)     Patient Name:   MRN:   Today's Date:   Start Time:   Stop Time:   Time Calculation (min):      History of Present Illness:Evelyn Hutchinson is a 75 y/o F with a Pmhx of COPD, HFpEF, PE, AoS, inability to wean from vent support, prompting tracheostomy (uncuffed at baseline), who is admitted to the ICU from the ED on 4/18/2025 with acute on chronic respiratory failure with hypercapnia (pCO2 105).  On-going issues include the following:      Acute metabolic encephalopathy d/t acute respiratory failure with hypercapnia   Hypercapnia exacerbated by hyperoxia (paO2 > 300 mmHg)  Hypercapnia on presentation possibly d/t atypical pneumonia   Hyponatremia: chronicity is unclear   Chronic physical debilitation \"     Had FEES performed 4/14/25 and mechanical soft diet with thin liquids was recommended.       Tracheostomy Type: #7.5 shiley cuffed     Ventilator/O2 Requirements: trach collar     Pt has PMSV at SNF, however not present with pt for this admission.     Impression:   [place] Speaking Valve evaluation completed.  Pt tolerated placement of valve for entirety of session with adequate voice and strong, productive cough.  Pt tolerated MBSS with PMSV placed and no drop on O2 or increased RR and tolerance per pt comfort.       Recommend Speech Therapy Services:   Yes; Targeting PMSV trials.      Goal(s):   Pt will complete PMSV trials with no significant changes to vital signs for >30 min at a time.       Plan:  SLP Services Indicated: Yes  Frequency: 2x week  Discussed POC with patient  SLP - OK to Discharge     Pain:   0-10  0 = No pain.     Inpatient Education:  Extensive education provided to patient regarding current function, recommendations/results, and POC.      Consultations/Referrals/Coordination of Services:     "

## 2025-04-23 NOTE — PROCEDURES
Speech-Language Pathology    Inpatient Modified Barium Swallow Study    Patient Name: Evelyn Hutchinson  MRN: [id number]  : 1950  Today's Date: 25             Modified Barium Swallow Study completed. Informed verbal consent obtained prior to completion of exam. Trials of thin, nectar/mildly thick liquid, honey/moderately thick liquid, puree, regular solids and barium tablet with thin liquid were given.     Modified Barium Swallow Study completed. Informed verbal consent obtained prior to completion of exam. The study was completed per protocol with various liquid barium consistencies, pudding, solids and a 13mm barium tablet.  A 1.9 cm or .75 inch (outer diameter) ring was placed on the chin in the lateral view and on the lateral, left side of the neck in the a-p view in order to complete objective measurements during swallowing. The anatomic structures and function of the oropharynx, larynx, hypopharynx and cervical esophagus were evaluated.    SLP: CARLIE Martinez   Contact info: [employer] secure chat; phone: [phone]      Reason for Referral: H/o dysphagia   Patient Hx: Trach + PEG.  On Mechanical soft diet/thin liquids at SNF  Respiratory Status: Room air  Current diet: NPO        DIET RECOMMENDATIONS:   - Easy to Chew (IDDSI Level 7)  - Thin liquids (IDDSI Level 0)      STRATEGIES:  - Small bites  - Small, single sips  - Add moisture to dry foods  - Upright for all PO intake  - Medications crushed in puree (verify with MD)  - Complete oral care frequently throughout the day  - Full supervision with meals; One to one assist with meals      SLP PLAN:  Skilled SLP Services: Skilled SLP intervention for dysphagia is warranted.  SLP Frequency: 3x per week  Duration:  Treatment/Interventions:   - Bolus trials  - Compensatory strategy training  - Diet tolerance/advancement  - Patient/caregiver education    Discussed POC: Patient  Discussed Risks/Benefits: Yes  Patient/Caregiver Agreeable: Yes    Short  term goals established 04/23/25:   - Patient will tolerate current diet without noted pulmonary compromise or evidence of pulmonary sequela as noted in patient chart and/or reported by patient/family.  - Patient will independently implement safe swallowing strategies to reduce risk of aspiration with use of compensatory strategies during 90% of therapeutic trials.  Start Date: 4/23/25  End Date: 4/23/25  Status: Goal Initiated this date    Long term goals 04/23/25:   Patient will tolerate the least restrictive diet without overt difficulty or further pulmonary compromise by time of discharge.      Education Provided: Results and recommendations per MBSS, with video review; recommendations and POC at this time. Verbal understanding and agreement given on all accounts.       Repeat Study: Per MD or treating SLP       Mechanics of the Swallow Summary:  ORAL PHASE:  Lip Closure - No labial escape/anterior loss of bolus   Tongue Control During Bolus Hold - Cohesive bolus between tongue to palatal seal   Bolus prep/mastication - Slow prolonged mastication with complete re-collection necessary   Bolus transport/lingual motion - Brisk tongue motion for A-P movement of the bolus   Oral residue - Residue collection on oral structure     PHARYNGEAL PHASE:  Initiation of pharyngeal swallow - Bolus head at pit of pyriforms   Soft palate elevation - No bolus between soft palate/pharyngeal wall   Laryngeal elevation - Complete superior movement of thyroid cartilage with contact of arytenoids to epiglottic petiole   Anterior hyoid excursion - Complete anterior movement   Epiglottic movement - Partial inversion  Laryngeal vestibule closure - Incomplete - narrow column of air/contrast in laryngeal vestibule   Pharyngeal stripping wave - Complete  Pharyngeal contraction (A/P view) - Not tested       Pharyngoesophageal segment opening - Complete distension and complete duration/no obstruction of flow of bolus   Tongue base retraction -  No bolus between tongue base and posterior pharyngeal wall   Pharyngeal residue - Complete pharyngeal clearance     ESOPHAGEAL PHASE:  Esophageal clearance - Complete clearance       SLP Impressions with Severity Rating:   Pt presents with mild oropharyngeal dysphagia upon completion of modified barium swallow study this date. Swallowing physiology is detailed above. Some calcifications noted overlaying laryngeal vestibule, however remained unchanged as study progressed. Impairments most impacting swallowing safety and efficiency include delayed swallow onset, mostly complete epiglottic inversion with some impairment in approximation. Patient demonstrated transient penetration on consecutive straw drinks of thin barium (>4oz consecutively), single episode of trace penetration of thin barium via spoon with cord contact, however ejected as swallow completed. No further penetration was observed for any other consistency, and no aspiration was visualized during study.     *Of note: The oblique bolus follow-through is not intended to be utilized as a diagnostic assessment of the esophagus, rather a tool to observe the biomechanical aspects of the swallow continuum and to inform the need for further evaluation by medical specialists, as applicable.     Strategies attempted- Effortful swallow resulted in improved clearance of solids.       OUTCOME MEASURES:  Functional Oral Intake Scale  Functional Oral Intake Scale: Level 5        total oral diet with multiple consistencies, but requires special preparations and compensations       Eating Assessment Tool (EAT-10)   0=No problem, 1=Mild problem, 2=Mild to moderate problem, 3=Moderate problem, 4=Severe problem         A total score of 3 or above may indicate difficulty with swallowing safely and/or efficiently      Rosenbek's Penetration Aspiration Scale  Thin Liquids: 2. PENETRATION that CLEARS - contrast enter airway, above vocal cords, no residue  Nectar Thick Liquids:  1. NO ASPIRATION & NO PENETRATION - no aspiration, contrast does not enter airway  Honey Thick Liquids: 1. NO ASPIRATION & NO PENETRATION - no aspiration, contrast does not enter airway  Puree: 1. NO ASPIRATION & NO PENETRATION - no aspiration, contrast does not enter airway  Soft Solid: 1. NO ASPIRATION & NO PENETRATION - no aspiration, contrast does not enter airway  Solids: 1. NO ASPIRATION & NO PENETRATION - no aspiration, contrast does not enter airway    If overt s/s aspiration persist during meal, contact SLP for diet downgrade and continued assessment.  Recommend Pt wear PMSV during meals for pressure assist.

## 2025-04-23 NOTE — PROGRESS NOTES
"Evelyn Hutchinson is a 74 y.o. female on day 5 of admission presenting with Acute on chronic respiratory failure with hypercapnia.    Subjective   Feels \"okay\".  Admits to mild shortness of breath, but denies pain or cough.  On 30% trach collar with an oxygen saturation of 100%.  Stool C. difficile was negative.  Sputum culture is pending.     Objective   Physical Exam  Vitals  Blood pressure (!) 146/100, pulse 68, temperature 36.5 °C (97.7 °F), temperature source Temporal, resp. rate 15, height 1.676 m (5' 5.98\"), weight 47.9 kg (105 lb 9.6 oz), SpO2 100%.  Intake/Output last 3 Shifts:  I/O last 3 completed shifts:  In: 1159 (24.2 mL/kg) [NG/GT:1159]  Out: 1600 (33.4 mL/kg) [Urine:1300 (0.8 mL/kg/hr); Stool:300]  Weight: 47.9 kg     General-awake, alert and in no acute distress.  Lungs-  Scattered rhonchi without wheezes or rales.  Heart-regular rate and rhythm.  Abdomen-positive bowel sounds.    Extremities-no edema.  Skin-no rashes.    Scheduled Medications[1]     Results for orders placed or performed during the hospital encounter of 04/18/25 (from the past 24 hours)   C. difficile, PCR    Specimen: Stool   Result Value Ref Range    C. difficile, PCR Not Detected Not Detected   POCT GLUCOSE   Result Value Ref Range    POCT Glucose 123 (H) 74 - 99 mg/dL   POCT GLUCOSE   Result Value Ref Range    POCT Glucose 252 (H) 74 - 99 mg/dL   POCT GLUCOSE   Result Value Ref Range    POCT Glucose 166 (H) 74 - 99 mg/dL   Renal function panel   Result Value Ref Range    Glucose 191 (H) 74 - 99 mg/dL    Sodium 135 (L) 136 - 145 mmol/L    Potassium 3.7 3.5 - 5.3 mmol/L    Chloride 94 (L) 98 - 107 mmol/L    Bicarbonate 34 (H) 21 - 32 mmol/L    Anion Gap 11 10 - 20 mmol/L    Urea Nitrogen 21 6 - 23 mg/dL    Creatinine 0.71 0.50 - 1.05 mg/dL    eGFR 90 >60 mL/min/1.73m*2    Calcium 9.1 8.6 - 10.3 mg/dL    Phosphorus 2.7 2.5 - 4.9 mg/dL    Albumin 3.1 (L) 3.4 - 5.0 g/dL   Magnesium   Result Value Ref Range    Magnesium 2.06 1.60 - " 2.40 mg/dL   CBC and Auto Differential   Result Value Ref Range    WBC 9.7 4.4 - 11.3 x10*3/uL    nRBC 0.3 (H) 0.0 - 0.0 /100 WBCs    RBC 3.88 (L) 4.00 - 5.20 x10*6/uL    Hemoglobin 10.7 (L) 12.0 - 16.0 g/dL    Hematocrit 35.2 (L) 36.0 - 46.0 %    MCV 91 80 - 100 fL    MCH 27.6 26.0 - 34.0 pg    MCHC 30.4 (L) 32.0 - 36.0 g/dL    RDW 16.1 (H) 11.5 - 14.5 %    Platelets 317 150 - 450 x10*3/uL    Neutrophils % 72.0 40.0 - 80.0 %    Immature Granulocytes %, Automated 0.2 0.0 - 0.9 %    Lymphocytes % 19.7 13.0 - 44.0 %    Monocytes % 6.6 2.0 - 10.0 %    Eosinophils % 1.2 0.0 - 6.0 %    Basophils % 0.3 0.0 - 2.0 %    Neutrophils Absolute 6.99 (H) 1.60 - 5.50 x10*3/uL    Immature Granulocytes Absolute, Automated 0.02 0.00 - 0.50 x10*3/uL    Lymphocytes Absolute 1.91 0.80 - 3.00 x10*3/uL    Monocytes Absolute 0.64 0.05 - 0.80 x10*3/uL    Eosinophils Absolute 0.12 0.00 - 0.40 x10*3/uL    Basophils Absolute 0.03 0.00 - 0.10 x10*3/uL   POCT GLUCOSE   Result Value Ref Range    POCT Glucose 218 (H) 74 - 99 mg/dL      Assessment:  74-year-old woman with a history of diabetes, hypertension, congestive heart failure, carotid stenosis, peripheral vascular disease, CVA, aortic regurgitation, pulmonary embolism-on Eliquis, COPD, chronic respiratory failure-status post tracheostomy and PEG tube, admitted with a change in mental status and hypoxia, and found to have an elevated white blood count with a left shift, hyponatremia, a markedly elevated BNP and an abnormal chest x-ray showing cardiomegaly with probable pulmonary edema, due to congestive heart failure, and leading to acute-on-chronic hypercapnic and hypoxemic respiratory failure, with possible superimposed aspiration pneumonia, who now appears clinically improved.  There is no bronchospasm presently.    Recommend:  1.  Continue DuoNeb, budesonide, Lasix and Eliquis.  2.  Wean FiO2 to keep oxygen saturation approximately 92 to 95%, with trach cuff deflated; trial of a  [place] speaking valve.  3.  Check sputum culture--pending.  4.  Check VBG.  5.  Consider CT scan of the chest, without contrast, prior to discharge to assess the patient's right basilar opacities seen on admission chest x-ray.  6.  For modified barium swallow today.  7.  Follow-up with pulmonary medicine and ENT after discharge.  8.  The patient is DNR.    Andrew Rodriguez MD         [1] amLODIPine, 5 mg, oral, Daily  ammonium lactate, 1 Application, Topical, Daily  apixaban, 5 mg, g-tube, BID  aspirin, 81 mg, g-tube, Daily  atorvastatin, 40 mg, g-tube, Nightly  budesonide, 0.5 mg, nebulization, BID  carvedilol, 25 mg, g-tube, BID  citalopram, 10 mg, g-tube, Daily  esomeprazole, 20 mg, g-tube, Daily before breakfast  furosemide, 40 mg, oral, Daily  insulin lispro, 0-10 Units, subcutaneous, q6h  ipratropium-albuteroL, 3 mL, nebulization, 4x daily  losartan, 100 mg, g-tube, Daily  melatonin, 6 mg, oral, Nightly  sodium chloride, 2 g, oral, TID  tamsulosin, 0.4 mg, oral, Daily

## 2025-04-23 NOTE — PROGRESS NOTES
Physical Therapy    Physical Therapy Treatment    Patient Name: Evelyn Hutchinson  MRN: [id number]  Department: [place] [address] ICU  [facility]  Today's Date: 4/23/2025  Time Calculation  Start Time: 1339  Stop Time: 1353  Time Calculation (min): 14 min         Assessment/Plan   PT Assessment  Rehab Prognosis: Good  Barriers to Discharge Home: Caregiver assistance, Cognition needs, Physical needs  Caregiver Assistance: Caregiver assistance needed per identified barriers - however, level of patient's required assistance exceeds assistance available at home  Cognition Needs: 24hr supervision for safety awareness needed, Recollection or understanding of precautions/restrictions limited, Insight of patient limited regarding functional ability/needs  End of Session Communication: Bedside nurse  Assessment Comment: pt able to get up to chair, pt still requring assist 2/2 fatigue and weakness  End of Session Patient Position: Alarm on  PT Plan  Inpatient/Swing Bed or Outpatient: Inpatient  PT Plan  Treatment/Interventions: Bed mobility, Transfer training, Gait training, Balance training, Neuromuscular re-education, Strengthening, Endurance training, Therapeutic exercise, Therapeutic activity, Home exercise program, Positioning, Postural re-education  PT Plan: Ongoing PT  PT Frequency: 4 times per week  PT Discharge Recommendations: Moderate intensity level of continued care  Equipment Recommended upon Discharge: Wheeled walker  PT Recommended Transfer Status: Assist x1  PT - OK to Discharge: Yes (per POC)      General Visit Information:   PT  Visit  PT Received On: 04/23/25  General  Reason for Referral: 74 y.o. F admitted for acute on chronic resp failure with hypercapnia, off MV to baseline trach collar, s/p 1 unit PRBC, met enceph improving; PT consult for mobility impairment  Referred By: Eladio Lerma MD  Co-Treatment: OT  Co-Treatment Reason: to maximize pt. safety and participation  Prior to Session Communication:  Bedside nurse  Patient Position Received: Bed, 4 rail up, Alarm on  Preferred Learning Style: kinesthetic, auditory  General Comment: pt agreeable to tx    Subjective   Precautions:  Precautions  Hearing/Visual Limitations: [place]  Medical Precautions: Fall precautions  Precautions Comment: FMS, r/o c-diff     Date/Time Vitals Session Patient Position Pulse Resp SpO2 BP MAP (mmHg)    04/23/25 1400 --  --  83  --  97 %  --  --     04/23/25 1431 --  --  --  --  99 %  --  --     04/23/25 1500 --  --  74  --  98 %  --  --                 Objective   Pain:  Pain Assessment  0-10 (Numeric) Pain Score: 8  Pain Location: Leg  Pain Orientation: Right, Left  Pain Interventions: Medication (See MAR)  Cognition:  Cognition  Orientation Level: Disoriented to situation, Disoriented to place  Coordination:     Postural Control:  Static Sitting Balance  Static Sitting-Comment/Number of Minutes: pt performed at EOB with SBA pt performed x5 min  Static Standing Balance  Static Standing-Comment/Number of Minutes: pt performed static standing balance with UE support at RW and  min assist  x2 min         Bed Mobility  Bed Mobility: Yes  Bed Mobility 1  Bed Mobility 1: Supine to sitting  Level of Assistance 1: Minimum assistance  Bed Mobility Comments 1: HOB elevated, min vc for use of bed rail and to scoot closer to EOB    Ambulation/Gait Training  Ambulation/Gait Training Performed: Yes  Ambulation/Gait Training 1  Surface 1: Level tile  Device 1: Rolling walker  Gait Support Devices: Gait belt  Assistance 1: Minimum assistance (x2)  Comments/Distance (ft) 1: bed to chair (pt able to take small steps to chair, with vc for turning 2/2 multiple lines)  Transfer 1  Technique 1: Sit to stand, Stand to sit  Transfer Device 1: Walker  Transfer Level of Assistance 1: Minimum assistance  Trials/Comments 1: vc/tc for hand placment on solid sitting surface and not RW. pt performed x2    Outcome Measures:  [facility] Basic Mobility  Turning from your  back to your side while in a flat bed without using bedrails: A little  Moving from lying on your back to sitting on the side of a flat bed without using bedrails: A little  Moving to and from bed to chair (including a wheelchair): A little  Standing up from a chair using your arms (e.g. wheelchair or bedside chair): A little  To walk in hospital room: A lot  Climbing 3-5 steps with railing: Total  Basic Mobility - Total Score: 15    Education Documentation  Body Mechanics, taught by Colton Syed PTA at 4/23/2025  3:42 PM.  Learner: Patient  Readiness: Acceptance  Method: Explanation  Response: Verbalizes Understanding    Mobility Training, taught by Colton Syed PTA at 4/23/2025  3:42 PM.  Learner: Patient  Readiness: Acceptance  Method: Explanation  Response: Verbalizes Understanding    Education Comments  No comments found.        OP EDUCATION:       Encounter Problems       Encounter Problems (Active)       Balance       STG - Maintains dynamic standing balance with upper extremity support and CGA with LRAD >10 minutes with fair+ balance and stable vitals. (Progressing)       Start:  04/22/25    Expected End:  05/06/25       INTERVENTIONS:1. Practice standing with minimal support.2. Educate patient about standing tolerance.3. Educate patient about independence with gait, transfers, and ADL's.4. Educate patient about use of assistive device.5. Educate patient about self-directed care.         Increase Tinetti CAROL score to >24/28 to indicate low risk of falling.  (Progressing)       Start:  04/22/25    Expected End:  05/06/25               Mobility       STG - Patient will ambulate >50 feet x3 with LRAD and min A, with fluid and steady reciprocal gait pattern, stable vitals. (Progressing)       Start:  04/22/25    Expected End:  05/06/25               PT Transfers       STG - Patient will perform bed mobility with min A x1 (Met)       Start:  04/22/25    Expected End:  05/06/25    Resolved:  04/23/25          STG - Patient will transfer sit to and from stand with CGA x1 and LRAD from all surfaces/heights consistently (Progressing)       Start:  04/22/25    Expected End:  05/06/25

## 2025-04-23 NOTE — PROGRESS NOTES
PROGRESS NOTE - INTERNAL MEDICINE     PATIENT NAME:  Evelyn Hutchinson    MRN:  [id number]  SERVICE DATE:  4/23/2025       ADMITTING PHYSICIAN:  Servando Cordova, DO    ASSESSMENT AND PLAN    Hypertension  Urinary retention, requiring [place] this admission. Failed voiding trials after 2x str cath for retention.  Acute on chronic hypoxic and hypercapnic respiratory failure, improved with a short interval mechanical ventilation support.  Pneumonia suspected, but ruled out.  Hx chronic PE.  On long-term Eliquis  Diarrhea, uncertain etiology.?  Antibiotics,?  GI infection. C diff neg  Chronic insomnia  Chronic pain syndrome  Acute on chronic diastolic HF - better  Hyperlipidemia  Hx COPD, s/p chronic tracheostomy  Chronic hyponatremia  Anemia  Hx DM2. Hyperglycemia prob stress / steroids.  2+ aortic incompetence (I35.1).  Normal LV systolic function, unable to evaluate diastolic function due to arctic incompetence, echo 1/29/2025, [facility].  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  Continue IV diuresis.  Monitor renal function closely.  check electrolytes and replete as needed  Pulmonary consult noted  Cardiology consult noted  Monitor for bleeding.  Accu-Cheks, SSI, hypoglycemia protocol.  CT ch prior to DC  PO Diet with asp precautions.  Await sp c/s        DISPOSITION PLAN:  DC planning when ok with pulm       INTERVAL HISTORY OF PRESENT ILLNESS:  anxious. Completed MBS. SOB Is better. Pain at G tube site. No chest pain/sob. No n/v/d. Tube feeds+. No new fever. acute events from last 24 hrs reviewed.     Pertinent ROS:  No abdominal pain / No Bleeding / No rashes     Discussed with nursing and case management team and the specialists involved in this patient's care. Reviewed the EMR and documentation from other care-givers.        OBJECTIVE  PHYSICAL EXAM:      GENERAL: awake, alert, resting comfortably. Anxious. +hard of hearing.  SKIN: Skin  turgor normal. No rashes  HEENT: EOMI, no epistaxis, Moist mucosa.  LUNGS: Bilat breath sounds, with no added sounds  CARDIAC: REGULAR. no rubs, no murmur  ABDOMEN: soft, non-tender. +BS.  EXTREMITIES: No edema, Good capillary refill.   NEURO: Insight fair. No invol movements. Gait not assessed  MUSCULOSKELETAL: No acute inflammation            4/23/2025     3:00 AM 4/23/2025     4:00 AM 4/23/2025     5:00 AM 4/23/2025     6:00 AM 4/23/2025     7:00 AM 4/23/2025     8:00 AM 4/23/2025     9:00 AM   Vitals   Systolic 148 144 155 136 158 161 146   Diastolic 122 57 71 55 53 68 100   Heart Rate 73 70 83 73 68 70 68   Temp      36.5 °C (97.7 °F)    Resp 15 14 19 14 15 17 15   Weight (lb)    105.6      BMI    17.05 kg/m2      BSA (m2)    1.49 m2        Body mass index is 17.05 kg/m².    Intake/Output Summary (Last 24 hours) at 4/23/2025 0959  Last data filed at 4/23/2025 0800  Gross per 24 hour   Intake 533 ml   Output 900 ml   Net -367 ml         Current Medications[1]    DATA:   Diagnostic tests reviewed for today's visit:    Most recent labs  Results from last 7 days   Lab Units 04/23/25  0519 04/22/25  0524 04/21/25  0810   WBC AUTO x10*3/uL 9.7 14.7* 9.5   HEMOGLOBIN g/dL 10.7* 10.3* 6.9*   HEMATOCRIT % 35.2* 31.9* 22.9*   PLATELETS AUTO x10*3/uL 317 273 241     Results from last 7 days   Lab Units 04/23/25  0519 04/22/25  0524 04/21/25  0810 04/20/25  0353 04/19/25  0538   SODIUM mmol/L 135* 134* 137 136 132*   POTASSIUM mmol/L 3.7 3.7 3.3* 3.1* 3.9   CHLORIDE mmol/L 94* 95* 95* 97* 97*   CO2 mmol/L 34* 34* 37* 33* 29   BUN mg/dL 21 24* 29* 26* 21   CREATININE mg/dL 0.71 0.74 0.84 0.71 0.58   CALCIUM mg/dL 9.1 9.1 8.7 8.9 8.7   PROTEIN TOTAL g/dL  --   --  5.7* 5.8* 5.8*   BILIRUBIN TOTAL mg/dL  --   --  0.3 0.3 0.3   ALK PHOS U/L  --   --  116 141* 182*   ALT U/L  --   --  34 44 56*   AST U/L  --   --  14 21 31   GLUCOSE mg/dL 191* 147* 78 180* 147*     Results from last 7 days   Lab Units 04/18/25  2147  04/18/25  1509   POCT PH, ARTERIAL pH 7.30* 7.10*   POCT PCO2, ARTERIAL mm Hg 61* 105*   POCT PO2, ARTERIAL mm Hg 172* 333*   POCT HCO3 CALCULATED, ARTERIAL mmol/L 30.0* 32.6*   POCT BASE EXCESS, ARTERIAL mmol/L 3.1* 2.0         Results from last 7 days   Lab Units 04/23/25  0607 04/23/25  0006 04/22/25  1757 04/22/25  1154 04/22/25  0435 04/21/25  2307 04/21/25  2028 04/21/25  1637 04/21/25  0503 04/20/25  2320 04/20/25  1721 04/20/25  1138   POCT GLUCOSE mg/dL 218* 166* 252* 123* 158* 210* 222* 178* 152* 231* 227* 147*        XR chest 1 view   Final Result   1. Cardiomegaly and findings of pulmonary edema.   2. Nodular densities at the right lung base measuring 2.8 cm 1.5 cm   there is nonspecific and could represent areas of round atelectasis   or pulmonary nodules. Nonemergent chest CT recommended for further   evaluation.             MACRO:   None.        Signed by: Edu Daugherty 4/18/2025 2:36 PM   Dictation workstation:   [id number]      FL modified barium swallow study    (Results Pending)         SIGNATURE: Byron Carrizales MD PATIENT NAME: Evelyn Hutchinson   DATE: 4/23/2025 MRN: [id number]   TIME: 9:59 AM             [1]   Current Facility-Administered Medications:     acetaminophen (Tylenol) oral liquid 1,000 mg, 1,000 mg, g-tube, q6h PRN, Eladio Lerma MD, 1,000 mg at 04/23/25 0448    amLODIPine (Norvasc) tablet 5 mg, 5 mg, oral, Daily, SWATHI Eugene-CNP, 5 mg at 04/23/25 0818    ammonium lactate (Lac-Hydrin) 12 % lotion 1 Application, 1 Application, Topical, Daily, Eladio Lerma MD, 1 Application at 04/23/25 0819    apixaban (Eliquis) tablet 5 mg, 5 mg, g-tube, BID, Eladio Lerma MD, 5 mg at 04/23/25 0818    aspirin chewable tablet 81 mg, 81 mg, g-tube, Daily, Eladio Lerma MD, 81 mg at 04/23/25 0818    atorvastatin (Lipitor) tablet 40 mg, 40 mg, g-tube, Nightly, Eladio Lerma MD, 40 mg at 04/22/25 2040    budesonide (Pulmicort) 0.5 mg/2 mL nebulizer solution 0.5 mg, 0.5 mg,  nebulization, BID, Eladio Lerma MD, 0.5 mg at 04/23/25 0721    busPIRone (Buspar) tablet 2.5 mg, 2.5 mg, oral, q8h PRN, Byron Carrizales MD, 2.5 mg at 04/22/25 2040    carvedilol (Coreg) tablet 25 mg, 25 mg, g-tube, BID, Eladio Lerma MD, 25 mg at 04/23/25 0817    citalopram (CeleXA) tablet 10 mg, 10 mg, g-tube, Daily, Eladio Lerma MD, 10 mg at 04/23/25 0818    dextrose 50 % injection 12.5 g, 12.5 g, intravenous, q15 min PRN, Eladio Lerma MD    dextrose 50 % injection 25 g, 25 g, intravenous, q15 min PRN, Eladio Lerma MD    dicyclomine (Bentyl) capsule 10 mg, 10 mg, oral, 4x daily PRN, Byron Carrizales MD, 10 mg at 04/22/25 1312    esomeprazole (NexIUM) suspension 20 mg, 20 mg, g-tube, Daily before breakfast, Eladio Lerma MD, 20 mg at 04/23/25 0607    furosemide (Lasix) tablet 40 mg, 40 mg, oral, Daily, Brinda Jang, APRN-CNP, 40 mg at 04/23/25 0818    glucagon (Glucagen) injection 1 mg, 1 mg, intramuscular, q15 min PRN, Eladio Lerma MD    glucagon (Glucagen) injection 1 mg, 1 mg, intramuscular, q15 min PRN, Eladio Lerma MD    hydrALAZINE (Apresoline) injection 10 mg, 10 mg, intravenous, q4h PRN, Eladio Lerma MD, 10 mg at 04/19/25 1110    insulin lispro injection 0-10 Units, 0-10 Units, subcutaneous, q6h, Eladio Lerma MD, 4 Units at 04/23/25 0614    ipratropium-albuteroL (Duo-Neb) 0.5-2.5 mg/3 mL nebulizer solution 3 mL, 3 mL, nebulization, q2h PRN, Byron Carrizales MD    ipratropium-albuteroL (Duo-Neb) 0.5-2.5 mg/3 mL nebulizer solution 3 mL, 3 mL, nebulization, 4x daily, Byron Carrizales MD, 3 mL at 04/23/25 0721    losartan (Cozaar) tablet 100 mg, 100 mg, g-tube, Daily, Eladio Lerma MD, 100 mg at 04/23/25 0817    melatonin tablet 6 mg, 6 mg, oral, Nightly, Eladio Lerma MD, 6 mg at 04/22/25 2040    oxyCODONE (Roxicodone) solution 2.5 mg, 2.5 mg, oral, q8h PRN, Eladio Lerma MD, 2.5 mg at 04/23/25 0003    oxygen (O2) therapy, , inhalation, Continuous PRN -  O2/gases, Eladio Lerma MD, 30 percent at 04/23/25 0721    sodium chloride tablet 2 g, 2 g, oral, TID, Eladio Lerma MD, 2 g at 04/23/25 0817    tamsulosin (Flomax) 24 hr capsule 0.4 mg, 0.4 mg, oral, Daily, Eladio Lerma MD, 0.4 mg at 04/22/25 0912

## 2025-04-23 NOTE — CARE PLAN
The patient's goals for the shift include      The clinical goals for the shift include pt will remain HDS and free from Respiratory distress throughout the shift    Over the shift, the patient did  make progress toward the following goals.

## 2025-04-23 NOTE — PROGRESS NOTES
Occupational Therapy Treatment    Name: Evelyn Hutchinson  MRN: [id number]  Department: [facility] [address] [facility]  Room: [address] [place] [address]  Date: 04/23/25  Time Calculation  Start Time: 1341  Stop Time: 1357  Time Calculation (min): 16 min    Assessment:  OT Assessment: Pt demonstrates improved standing balance and strength this date. Continue OT to optimize and promote independence with functional tasks. Recommend d/c to Mod Intensity  Prognosis: Good  Barriers to Discharge Home: No anticipated barriers  Evaluation/Treatment Tolerance: Patient limited by fatigue  Medical Staff Made Aware: Yes  End of Session Communication: Bedside nurse  End of Session Patient Position: Up in chair, Alarm on  Plan:  Treatment Interventions: ADL retraining, Functional transfer training, Endurance training  OT Frequency: 3 times per week  OT Discharge Recommendations: Moderate intensity level of continued care  Equipment Recommended upon Discharge: Wheeled walker  OT Recommended Transfer Status: Moderate assist  OT - OK to Discharge: Yes (Per POC)    Subjective   Previous Visit Info:  OT Last Visit  OT Received On: 04/23/25    General:  General  Reason for Referral: 74 y.o. F admitted for acute on chronic resp failure with hypercapnia, off MV to baseline trach collar, s/p 1 unit PRBC, met enceph improving  Referred By: Eladio Lerma MD  Past Medical History Relevant to Rehab: Medical History[1]    Co-Treatment: PT  Co-Treatment Reason: Co-treatment to maximize pt safety this date  Prior to Session Communication: Bedside nurse  Patient Position Received: Bed, 3 rail up, Alarm off, not on at start of session  General Comment: Cleared and agreeable to participate in OT.    Precautions:  Hearing/Visual Limitations: [place]  Medical Precautions: Fall precautions     Date/Time Vitals Session Patient Position Pulse Resp SpO2 BP MAP (mmHg)    04/23/25 1400 --  --  83  --  97 %  --  --     04/23/25 1431 --  --  --  --  99 %  --  --     04/23/25 1500 --  --  74  --  98  %  --  --                Pain Assessment:  Pain Assessment  Pain Assessment:  (Pt endorsing pain. not limited by pain. Repositioned)     Objective     Cognition:  Orientation Level: Disoriented to time    Activities of Daily Living: LE Dressing  Sock Level of Assistance: Maximum assistance  LE Dressing Where Assessed: Edge of bed  LE Dressing Comments: Adjusting non slip socks Max A despite cues    Functional Standing Tolerance:  Functional Standing Tolerance  Time: 1-1.5min  Activity: Functional transfers    Bed Mobility/Transfers: Bed Mobility  Bed Mobility: Yes  Bed Mobility 1  Bed Mobility 1: Supine to sitting  Level of Assistance 1: Contact guard    Transfers  Transfer: Yes  Transfer 1  Technique 1: Sit to stand, Stand to sit  Transfer Device 1: Walker  Transfer Level of Assistance 1: Contact guard    Sitting Balance:  Static Sitting Balance  Static Sitting-Balance Support: Feet supported  Static Sitting-Level of Assistance: Close supervision  Dynamic Sitting Balance  Dynamic Sitting-Balance Support: Feet supported  Dynamic Sitting-Level of Assistance: Close supervision, Contact guard  Standing Balance:  Static Standing Balance  Static Standing-Balance Support: Bilateral upper extremity supported  Static Standing-Level of Assistance: Contact guard, Minimum assistance  Dynamic Standing Balance  Dynamic Standing-Balance Support: Bilateral upper extremity supported  Dynamic Standing-Level of Assistance: Contact guard, Minimum assistance     Outcome Measures:  [facility] Daily Activity  Putting on and taking off regular lower body clothing: A lot  Bathing (including washing, rinsing, drying): A little  Putting on and taking off regular upper body clothing: A little  Toileting, which includes using toilet, bedpan or urinal: A lot  Taking care of personal grooming such as brushing teeth: A little  Eating Meals: A little  Daily Activity - Total Score: 16        Education Documentation  ADL Training, taught by Rosi  MELISSA Alvarez at 4/23/2025  3:54 PM.  Learner: Patient  Readiness: Acceptance  Method: Explanation, Demonstration  Response: Needs Reinforcement  Comment: Educated on purpose of OT visit, safety with transfers.    Education Comments  No comments found.      Goals:  Encounter Problems       Encounter Problems (Active)       Dressings Lower Extremities       LTG - Patient will demonstrate use of appropriate intervention to ensure safe dressing of lower extremities with use of reacher/sock aid (Progressing)       Start:  04/22/25    Expected End:  05/06/25               Functional Balance       STG - Maintains dynamic standing balance with upper extremity support to complete ADL tasks with CGA with LRAD (Progressing)       Start:  04/22/25    Expected End:  05/06/25       INTERVENTIONS:1. Practice standing with minimal support.2. Educate patient about standing tolerance.3. Educate patient about independence with gait, transfers, and ADL's.4. Educate patient about use of assistive device.5. Educate patient about self-directed care.            Grooming       LTG - Patient will demonstrate use of appropriate Intervention for safe grooming habits while supported sit with CGA with set up.        Start:  04/22/25    Expected End:  05/06/25               OT Transfers       LTG - Patient will demonstrate safe transfer techniques with LRAD and and Min A for safety (Progressing)       Start:  04/22/25    Expected End:  05/06/25               Toileting       LTG - Patient will utilize adaptive techniques/equipment to complete daily toileting tasks with use of BSC.       Start:  04/22/25    Expected End:  05/06/25                      [1]   Past Medical History:  Diagnosis Date    CHF (congestive heart failure)     COPD (chronic obstructive pulmonary disease) (Multi)     Pulmonary embolism     Stroke (Multi)

## 2025-04-23 NOTE — PROGRESS NOTES
"Subjective Data:  Speaking valve in place- denies any concerns   No chest pain or shortness of breath   States she wants to go home     Overnight Events:    N/A     Objective Data:  Last Recorded Vitals:  Vitals:    04/23/25 0900 04/23/25 1000 04/23/25 1100 04/23/25 1300   BP: (!) 146/100      BP Location:       Patient Position:       Pulse: 68 64 72 75   Resp: 15      Temp:       TempSrc:       SpO2: 100% 100% 98% 94%   Weight:       Height:           Last Labs:  LABS:  CMP:  Results from last 7 days   Lab Units 04/23/25  0519 04/22/25  0524 04/21/25  0810 04/20/25  0353 04/19/25  0538 04/18/25  1421   SODIUM mmol/L 135* 134* 137 136 132* 128*   POTASSIUM mmol/L 3.7 3.7 3.3* 3.1* 3.9 5.0   CHLORIDE mmol/L 94* 95* 95* 97* 97* 93*   CO2 mmol/L 34* 34* 37* 33* 29 31   ANION GAP mmol/L 11 9* 8* 9* 10 9*   BUN mg/dL 21 24* 29* 26* 21 21   CREATININE mg/dL 0.71 0.74 0.84 0.71 0.58 0.48*   EGFR mL/min/1.73m*2 90 86 74 90 >90 >90   MAGNESIUM mg/dL 2.06 2.36 1.75 1.94  --  1.82   ALBUMIN g/dL 3.1* 3.1* 2.9* 2.9* 3.1* 3.3*   ALT U/L  --   --  34 44 56* 74*   AST U/L  --   --  14 21 31 63*   BILIRUBIN TOTAL mg/dL  --   --  0.3 0.3 0.3 0.2     CBC:  Results from last 7 days   Lab Units 04/23/25  0519 04/22/25  0524 04/21/25  0810 04/20/25  0353 04/19/25  0538 04/18/25  1421   WBC AUTO x10*3/uL 9.7 14.7* 9.5 6.5 7.6 13.5*   HEMOGLOBIN g/dL 10.7* 10.3* 6.9* 7.0* 7.0* 7.3*   HEMATOCRIT % 35.2* 31.9* 22.9* 22.1* 22.6* 24.6*   PLATELETS AUTO x10*3/uL 317 273 241 229 225 285   MCV fL 91 90 91 89 89 93     COAG:     ABO:   ABO TYPE   Date Value Ref Range Status   04/21/2025 O  Final     HEME/ENDO:  Results from last 7 days   Lab Units 04/22/25  0524 04/21/25  0810   FERRITIN ng/mL  --  93   IRON SATURATION %  --  21*   HEMOGLOBIN A1C % 5.9*  --       CARDIAC:   Results from last 7 days   Lab Units 04/18/25  1421   TROPHS ng/L 11   BNP pg/mL 1,702*   No results for input(s): \"CHOL\", \"LDLF\", \"LDL\", \"LDLCALC\", \"HDL\", \"TRIG\" in the last " [id number] hours.      Last I/O:  I/O last 3 completed shifts:  In: 1159 (24.2 mL/kg) [NG/GT:1159]  Out: 1600 (33.4 mL/kg) [Urine:1300 (0.8 mL/kg/hr); Stool:300]  Weight: 47.9 kg     Past Cardiology Tests (Last 3 Years):  EKG:    ECG 12 lead 04/18/2025       Echo:  1/29/2025- [facility]  CONCLUSIONS:   - Technically difficult exam due to COPD.   - Exam indication: Shortness of Breath   - The left ventricle is normal in size. Left ventricular systolic function is  normal. EF = 60 ± 5% (visual est.) Left ventricular diastolic function was not  evaluated due to >2+ AI.   - The right ventricle is normal in size. Right ventricular systolic function is  normal.   - The left atrial cavity is mildly dilated.   - There is moderate (2+) aortic valve regurgitation.   - Agitated saline was administered to rule out shunt . There is no evidence of intracardiac shunting as detected by Doppler and agitated saline contrast (clip 90).   - The patient has not had a prior CC echocardiographic exam for comparison.      1/21/2024- [facility]  - Technically difficult exam due to body habitus and suboptimal positioning.   - Exam indication: Shortness of Breath   - The left ventricle is normal in size. Left ventricular systolic function is normal. EF = 57 ± 5% (2D biplane)   - The right ventricle is normal in size. Right ventricular systolic function is mildly decreased.   - Aortic sclerosis with mild to moderate AI.   - The patient has not had a prior CC echocardiographic exam for comparison.      10/23/2023- [facility]    Normal LV systolic function.   The left ventricular ejection fraction (LVEF) is 70%.   Diastolic LV function assessed by resting Doppler is uncertain.   Normal RV systolic function.   Concentric left ventricular hypertrophy is present.   Mild tricuspid valve regurgitation.   Noninvasive hemodynamic assessment is consistent with mild pulmonary   hypertension (40-50 mmHg), a normal CVP, an elevated LVEDP.      10/16/2023- [facility]   Low normal LV systolic function.    The left ventricular ejection fraction (LVEF) is 55%.    Normal RV systolic function.    Mild aortic valve regurgitation.    No hemodynamically significant mitral, pulmonic, tricuspid valve disease.    Fibrocalcific changes are seen in the aortic valve.       7/27/2018- [facility]   Left ventricular systolic function is normal.    The left ventricular ejection fraction (LVEF) is 60% +/- 5%    Normal right ventricular size and function.    There is mild aortic regurgitation.    Noninvasive hemodynamic assessment is consistent with normal pulmonary  artery systolic pressure.      3/5/2016- [facility]   CONCLUSIONS:   - Technically difficult exam due to mechanical ventilation and suboptimal positioning.   - Exam indication: Congestive Heart Failure   - The left ventricle is mildly dilated. Left ventricular systolic function is moderately decreased. EF = 37 ± 5% (2D biplane) Baseline left ventricular diastolic function was not evaluated due to fusion of E and A due to tachycardia.     The anterior wall is severely hypokinetic.The inferior wall is moderately hypokinetic.   The mass in the apsex may represent a small thrombus.A SAMSON would be helpful.   - The right ventricle is normal in size. Right ventricular systolic function is low normal.   - There is no evidence of intracardiac shunt as detected by aggitated saline study.      4/18/2013- [facility]   The left ventricular size is small.    Left ventricular systolic function is normal.    The left ventricular ejection fraction (LVEF) is 70% +/- 5%by the single  plane summation of discs (Felix's rule) method.    There is a 4 mmHg mid-left ventricular cavity (papillary muscle)  obstruction.    Normal right ventricular size and function.    Aortic fibrocalcific (non-stenotic) changes are present and are mild.    There is mild aortic regurgitation.    Noninvasive hemodynamic assessment is consistent  with normal pulmonary artery systolic pressure, a likely normal LVEDP.      7/2/2012- [facility]   This was a limited study to assess LV wall motion.    Focally abnormal LV systolic function.    Focal LV systolic dysfunction consists of severe hypokinesia of the entire  apex.    The left ventricular ejection fraction (LVEF) is 40%.    Normal RV systolic function.    No hemodynamically significant valve disease.    Noninvasive hemodynamic assessment is consistent with mild pulmonary    hypertension (40-50 mmHg), a mildly elevated CVP, a likely normal LVEDP.    Significant changes since the prior study include new apical hypokinesis.    The study is consistent with distal LAD disease vs apical ballooning  syndrome (a.k.a. takotsubo cardiomyopathy).      4/9/2012- [facility]   Normal LV systolic function.    The left ventricular ejection fraction (LVEF) is 60%.    Normal RV systolic function.    Mild to moderate aortic valve regurgitation.    No hemodynamically significant mitral, pulmonic, tricuspid valve disease.    The pulmonary artery systolic pressure could not be estimated.    There is no evidence of an interatrial shunt.      Cath:  No results found for this or any previous visit from the past 1095 days.    Stress Test:  No results found for this or any previous visit from the past 1095 days.    Cardiac Imaging:  No results found for this or any previous visit from the past 1095 days.      Inpatient Medications:  Scheduled Medications[1]  PRN Medications[2]  Continuous Medications[3]    Physical Exam:  GENERAL: alert, cooperative, pleasant, in no acute distress  SKIN: warm, dry  NECK: Trach collar in place   CARDIAC: Regular rate and rhythm + murmur  PULMONARY: Normal respiratory efforts, lungs clear to auscultation bilaterally. Currently on trach collar 30% FIO2  ABDOMEN: soft, nondistended  EXTREMITIES: no lower extremity edema  NEURO: Alert and oriented x 3.  Grossly normal.  Moves all 4 extremities.       Assessment/Plan   Evelyn Hutchinson is a 74 y.o. female with past medical history significant for Chronic HFpEF, Paroxysmal atrial fibrillation currently on Eliquis,  Moderate aortic insufficiency on TTE 1/2025, COPD, Chronic hypercapnic respiratory failure status post tracheostomy and PEG tube 2/2025 (complicated by laryngeal injury and stridor requiring debridement of granulation tissue and tracheostomy revision 4/8/2025- tracheostomy currently uncuffed at baseline),  Diabetes mellitus, Hypertension, hyperlipidemia, CVA, asthma, Pulmonary embolism 2012, Carotid stenosis. Presented with hypoxia. Cardiology is consulted for HF exac. 1/25 echo [facility] with 2+ AI normal LV systolic function ?needs addl eval/Rx.      Home CV meds:  Eliquis 5 mg twice daily Atorvastatin 40 mg daily Carvedilol 25 mg twice daily Losartan 100 mg daily      #Acute on chronic hypoxic and hypercapnic respiratory failure  Required brief mechanical ventilation.  Has been weaned down to trach collar.  Currently on 30% FiO2     #Acute on chronic diastolic heart failure-chest x-ray with vascular congestion and BNP 1702 on admission.  Currently appears euvolemic on clinical exam.     #Moderate aortic insufficiency  -Noted on TTE 1/2025  - Continue  amlodipine 5 mg daily     #Paroxysmal atrial fibrillation  Sinus rhythm on telemetry  Continue Eliquis 5 mg twice daily for anticoagulation     #Hypertension  Continue amlodipine 5 mg dailycarvedilol 25 mg twice daily and losartan 100 mg daily     Recommendations  -Recommend discharge home with Lasix 40 mg daily and additional 40 mg as needed for weight gain.  - Outpatient echocardiogram to further assess aortic insufficiency.    Patient follows with cardiology at [facility]    Cardiology will sign off at this time   Please call with question       Code Status:  DNSWATHI Rosales-OSCAR       [1]   Scheduled medications   Medication Dose Route Frequency    amLODIPine  5 mg oral Daily    ammonium  lactate  1 Application Topical Daily    apixaban  5 mg g-tube BID    aspirin  81 mg g-tube Daily    atorvastatin  40 mg g-tube Nightly    budesonide  0.5 mg nebulization BID    carvedilol  25 mg g-tube BID    citalopram  10 mg g-tube Daily    esomeprazole  20 mg g-tube Daily before breakfast    furosemide  40 mg oral Daily    insulin lispro  0-10 Units subcutaneous q6h    ipratropium-albuteroL  3 mL nebulization 4x daily    losartan  100 mg g-tube Daily    melatonin  6 mg oral Nightly    sodium chloride  2 g oral TID    tamsulosin  0.4 mg oral Daily   [2]   PRN medications   Medication    acetaminophen    busPIRone    dextrose    dextrose    dicyclomine    glucagon    glucagon    hydrALAZINE    ipratropium-albuteroL    oxyCODONE    oxygen   [3]   Continuous Medications   Medication Dose Last Rate

## 2025-04-24 LAB
ALBUMIN SERPL BCP-MCNC: 3.3 G/DL (ref 3.4–5)
ANION GAP SERPL CALC-SCNC: 11 MMOL/L (ref 10–20)
BASE EXCESS BLDV CALC-SCNC: 9.3 MMOL/L (ref -2–3)
BASOPHILS # BLD AUTO: 0.04 X10*3/UL (ref 0–0.1)
BASOPHILS NFR BLD AUTO: 0.4 %
BODY TEMPERATURE: 37 DEGREES CELSIUS
BUN SERPL-MCNC: 16 MG/DL (ref 6–23)
CALCIUM SERPL-MCNC: 8.8 MG/DL (ref 8.6–10.3)
CHLORIDE SERPL-SCNC: 92 MMOL/L (ref 98–107)
CO2 SERPL-SCNC: 34 MMOL/L (ref 21–32)
CREAT SERPL-MCNC: 0.65 MG/DL (ref 0.5–1.05)
EGFRCR SERPLBLD CKD-EPI 2021: >90 ML/MIN/1.73M*2
EOSINOPHIL # BLD AUTO: 0.47 X10*3/UL (ref 0–0.4)
EOSINOPHIL NFR BLD AUTO: 4.3 %
ERYTHROCYTE [DISTWIDTH] IN BLOOD BY AUTOMATED COUNT: 15.9 % (ref 11.5–14.5)
GLUCOSE BLD MANUAL STRIP-MCNC: 163 MG/DL (ref 74–99)
GLUCOSE BLD MANUAL STRIP-MCNC: 171 MG/DL (ref 74–99)
GLUCOSE BLD MANUAL STRIP-MCNC: 191 MG/DL (ref 74–99)
GLUCOSE BLD MANUAL STRIP-MCNC: 83 MG/DL (ref 74–99)
GLUCOSE SERPL-MCNC: 153 MG/DL (ref 74–99)
HCO3 BLDV-SCNC: 35.7 MMOL/L (ref 22–26)
HCT VFR BLD AUTO: 36 % (ref 36–46)
HGB BLD-MCNC: 11.2 G/DL (ref 12–16)
IMM GRANULOCYTES # BLD AUTO: 0.04 X10*3/UL (ref 0–0.5)
IMM GRANULOCYTES NFR BLD AUTO: 0.4 % (ref 0–0.9)
INHALED O2 CONCENTRATION: 30 %
LYMPHOCYTES # BLD AUTO: 2.35 X10*3/UL (ref 0.8–3)
LYMPHOCYTES NFR BLD AUTO: 21.7 %
MAGNESIUM SERPL-MCNC: 1.53 MG/DL (ref 1.6–2.4)
MCH RBC QN AUTO: 28.5 PG (ref 26–34)
MCHC RBC AUTO-ENTMCNC: 31.1 G/DL (ref 32–36)
MCV RBC AUTO: 92 FL (ref 80–100)
MONOCYTES # BLD AUTO: 0.79 X10*3/UL (ref 0.05–0.8)
MONOCYTES NFR BLD AUTO: 7.3 %
NEUTROPHILS # BLD AUTO: 7.13 X10*3/UL (ref 1.6–5.5)
NEUTROPHILS NFR BLD AUTO: 65.9 %
NRBC BLD-RTO: 0 /100 WBCS (ref 0–0)
OXYHGB MFR BLDV: 86 % (ref 45–75)
PCO2 BLDV: 55 MM HG (ref 41–51)
PH BLDV: 7.42 PH (ref 7.33–7.43)
PHOSPHATE SERPL-MCNC: 2.9 MG/DL (ref 2.5–4.9)
PLATELET # BLD AUTO: 324 X10*3/UL (ref 150–450)
PO2 BLDV: 53 MM HG (ref 35–45)
POTASSIUM SERPL-SCNC: 3.9 MMOL/L (ref 3.5–5.3)
PYRIDOXAL PHOS SERPL-SCNC: 19.6 NMOL/L (ref 20–125)
RBC # BLD AUTO: 3.93 X10*6/UL (ref 4–5.2)
SAO2 % BLDV: 88 % (ref 45–75)
SODIUM SERPL-SCNC: 133 MMOL/L (ref 136–145)
WBC # BLD AUTO: 10.8 X10*3/UL (ref 4.4–11.3)

## 2025-04-24 PROCEDURE — 36415 COLL VENOUS BLD VENIPUNCTURE: CPT | Performed by: ANESTHESIOLOGY

## 2025-04-24 PROCEDURE — 84100 ASSAY OF PHOSPHORUS: CPT | Performed by: ANESTHESIOLOGY

## 2025-04-24 PROCEDURE — 36415 COLL VENOUS BLD VENIPUNCTURE: CPT | Performed by: INTERNAL MEDICINE

## 2025-04-24 PROCEDURE — 2500000001 HC RX 250 WO HCPCS SELF ADMINISTERED DRUGS (ALT 637 FOR MEDICARE OP): Performed by: NURSE PRACTITIONER

## 2025-04-24 PROCEDURE — 1100000001 HC PRIVATE ROOM DAILY

## 2025-04-24 PROCEDURE — 82947 ASSAY GLUCOSE BLOOD QUANT: CPT

## 2025-04-24 PROCEDURE — 2500000001 HC RX 250 WO HCPCS SELF ADMINISTERED DRUGS (ALT 637 FOR MEDICARE OP): Performed by: INTERNAL MEDICINE

## 2025-04-24 PROCEDURE — 94640 AIRWAY INHALATION TREATMENT: CPT

## 2025-04-24 PROCEDURE — 2500000002 HC RX 250 W HCPCS SELF ADMINISTERED DRUGS (ALT 637 FOR MEDICARE OP, ALT 636 FOR OP/ED): Performed by: INTERNAL MEDICINE

## 2025-04-24 PROCEDURE — 82805 BLOOD GASES W/O2 SATURATION: CPT | Performed by: INTERNAL MEDICINE

## 2025-04-24 PROCEDURE — 2500000002 HC RX 250 W HCPCS SELF ADMINISTERED DRUGS (ALT 637 FOR MEDICARE OP, ALT 636 FOR OP/ED): Performed by: ANESTHESIOLOGY

## 2025-04-24 PROCEDURE — 2500000005 HC RX 250 GENERAL PHARMACY W/O HCPCS: Performed by: ANESTHESIOLOGY

## 2025-04-24 PROCEDURE — 2500000001 HC RX 250 WO HCPCS SELF ADMINISTERED DRUGS (ALT 637 FOR MEDICARE OP): Performed by: ANESTHESIOLOGY

## 2025-04-24 PROCEDURE — 83735 ASSAY OF MAGNESIUM: CPT | Performed by: ANESTHESIOLOGY

## 2025-04-24 PROCEDURE — 80069 RENAL FUNCTION PANEL: CPT | Performed by: ANESTHESIOLOGY

## 2025-04-24 PROCEDURE — 85025 COMPLETE CBC W/AUTO DIFF WBC: CPT | Performed by: ANESTHESIOLOGY

## 2025-04-24 PROCEDURE — 2500000004 HC RX 250 GENERAL PHARMACY W/ HCPCS (ALT 636 FOR OP/ED): Mod: JZ | Performed by: ANESTHESIOLOGY

## 2025-04-24 RX ORDER — BUDESONIDE 0.5 MG/2ML
0.5 INHALANT ORAL
Start: 2025-04-24 | End: 2025-06-23

## 2025-04-24 RX ORDER — LOPERAMIDE HYDROCHLORIDE 2 MG/1
2 CAPSULE ORAL 3 TIMES DAILY PRN
Start: 2025-04-24

## 2025-04-24 RX ORDER — AMLODIPINE BESYLATE 5 MG/1
5 TABLET ORAL DAILY
Start: 2025-04-25 | End: 2025-06-24

## 2025-04-24 RX ORDER — FUROSEMIDE 40 MG/1
40 TABLET ORAL DAILY
Start: 2025-04-25 | End: 2025-06-24

## 2025-04-24 RX ORDER — BUSPIRONE HYDROCHLORIDE 5 MG/1
2.5 TABLET ORAL EVERY 8 HOURS PRN
Start: 2025-04-24 | End: 2025-04-29

## 2025-04-24 RX ADMIN — INSULIN LISPRO 2 UNITS: 100 INJECTION, SOLUTION INTRAVENOUS; SUBCUTANEOUS at 12:43

## 2025-04-24 RX ADMIN — DICYCLOMINE HYDROCHLORIDE 10 MG: 10 CAPSULE ORAL at 20:50

## 2025-04-24 RX ADMIN — Medication 30 PERCENT: at 07:14

## 2025-04-24 RX ADMIN — ASPIRIN 81 MG CHEWABLE TABLET 81 MG: 81 TABLET CHEWABLE at 09:30

## 2025-04-24 RX ADMIN — Medication 30 PERCENT: at 02:21

## 2025-04-24 RX ADMIN — ACETAMINOPHEN 1000 MG: 160 SOLUTION ORAL at 18:30

## 2025-04-24 RX ADMIN — IPRATROPIUM BROMIDE AND ALBUTEROL SULFATE 3 ML: 2.5; .5 SOLUTION RESPIRATORY (INHALATION) at 15:15

## 2025-04-24 RX ADMIN — APIXABAN 5 MG: 5 TABLET, FILM COATED ORAL at 09:36

## 2025-04-24 RX ADMIN — BUDESONIDE 0.5 MG: 0.5 INHALANT RESPIRATORY (INHALATION) at 19:39

## 2025-04-24 RX ADMIN — FUROSEMIDE 40 MG: 40 TABLET ORAL at 09:04

## 2025-04-24 RX ADMIN — AMLODIPINE BESYLATE 5 MG: 5 TABLET ORAL at 09:30

## 2025-04-24 RX ADMIN — TAMSULOSIN HYDROCHLORIDE 0.4 MG: 0.4 CAPSULE ORAL at 09:18

## 2025-04-24 RX ADMIN — CARVEDILOL 25 MG: 25 TABLET, FILM COATED ORAL at 09:37

## 2025-04-24 RX ADMIN — ESOMEPRAZOLE MAGNESIUM 20 MG: 40 FOR SUSPENSION ORAL at 07:45

## 2025-04-24 RX ADMIN — INSULIN LISPRO 2 UNITS: 100 INJECTION, SOLUTION INTRAVENOUS; SUBCUTANEOUS at 05:42

## 2025-04-24 RX ADMIN — DICYCLOMINE HYDROCHLORIDE 10 MG: 10 CAPSULE ORAL at 02:23

## 2025-04-24 RX ADMIN — OXYCODONE HYDROCHLORIDE 2.5 MG: 5 SOLUTION ORAL at 18:54

## 2025-04-24 RX ADMIN — Medication 1 APPLICATION: at 09:32

## 2025-04-24 RX ADMIN — IPRATROPIUM BROMIDE AND ALBUTEROL SULFATE 3 ML: 2.5; .5 SOLUTION RESPIRATORY (INHALATION) at 11:18

## 2025-04-24 RX ADMIN — LOSARTAN POTASSIUM 100 MG: 50 TABLET, FILM COATED ORAL at 09:04

## 2025-04-24 RX ADMIN — Medication 30 PERCENT: at 23:23

## 2025-04-24 RX ADMIN — Medication 6 MG: at 20:49

## 2025-04-24 RX ADMIN — Medication 30 PERCENT: at 19:39

## 2025-04-24 RX ADMIN — ATORVASTATIN CALCIUM 40 MG: 40 TABLET, FILM COATED ORAL at 20:49

## 2025-04-24 RX ADMIN — INSULIN LISPRO 2 UNITS: 100 INJECTION, SOLUTION INTRAVENOUS; SUBCUTANEOUS at 23:07

## 2025-04-24 RX ADMIN — SODIUM CHLORIDE 2 G: 1 TABLET ORAL at 20:49

## 2025-04-24 RX ADMIN — IPRATROPIUM BROMIDE AND ALBUTEROL SULFATE 3 ML: 2.5; .5 SOLUTION RESPIRATORY (INHALATION) at 19:39

## 2025-04-24 RX ADMIN — ACETAMINOPHEN 1000 MG: 160 SOLUTION ORAL at 23:15

## 2025-04-24 RX ADMIN — OXYCODONE HYDROCHLORIDE 2.5 MG: 5 SOLUTION ORAL at 02:23

## 2025-04-24 RX ADMIN — BUSPIRONE HYDROCHLORIDE 2.5 MG: 5 TABLET ORAL at 10:54

## 2025-04-24 RX ADMIN — BUDESONIDE 0.5 MG: 0.5 INHALANT RESPIRATORY (INHALATION) at 07:14

## 2025-04-24 RX ADMIN — CITALOPRAM HYDROBROMIDE 10 MG: 20 TABLET ORAL at 09:40

## 2025-04-24 RX ADMIN — DICYCLOMINE HYDROCHLORIDE 10 MG: 10 CAPSULE ORAL at 07:47

## 2025-04-24 RX ADMIN — HYDRALAZINE HYDROCHLORIDE 10 MG: 20 INJECTION INTRAMUSCULAR; INTRAVENOUS at 08:07

## 2025-04-24 RX ADMIN — SODIUM CHLORIDE 2 G: 1 TABLET ORAL at 09:18

## 2025-04-24 RX ADMIN — APIXABAN 5 MG: 5 TABLET, FILM COATED ORAL at 20:49

## 2025-04-24 RX ADMIN — ACETAMINOPHEN 1000 MG: 160 SOLUTION ORAL at 07:44

## 2025-04-24 RX ADMIN — IPRATROPIUM BROMIDE AND ALBUTEROL SULFATE 3 ML: 2.5; .5 SOLUTION RESPIRATORY (INHALATION) at 07:14

## 2025-04-24 RX ADMIN — SODIUM CHLORIDE 2 G: 1 TABLET ORAL at 16:00

## 2025-04-24 RX ADMIN — CARVEDILOL 25 MG: 25 TABLET, FILM COATED ORAL at 20:49

## 2025-04-24 RX ADMIN — BUSPIRONE HYDROCHLORIDE 2.5 MG: 5 TABLET ORAL at 02:23

## 2025-04-24 RX ADMIN — OXYCODONE HYDROCHLORIDE 2.5 MG: 5 SOLUTION ORAL at 10:53

## 2025-04-24 ASSESSMENT — PAIN DESCRIPTION - DESCRIPTORS
DESCRIPTORS: PATIENT UNABLE TO DESCRIBE
DESCRIPTORS: PATIENT UNABLE TO DESCRIBE
DESCRIPTORS: ACHING
DESCRIPTORS: ACHING

## 2025-04-24 ASSESSMENT — COGNITIVE AND FUNCTIONAL STATUS - GENERAL
CLIMB 3 TO 5 STEPS WITH RAILING: A LOT
MOVING FROM LYING ON BACK TO SITTING ON SIDE OF FLAT BED WITH BEDRAILS: A LITTLE
DRESSING REGULAR LOWER BODY CLOTHING: A LOT
EATING MEALS: TOTAL
MOVING TO AND FROM BED TO CHAIR: A LITTLE
TOILETING: TOTAL
HELP NEEDED FOR BATHING: A LOT
TURNING FROM BACK TO SIDE WHILE IN FLAT BAD: A LITTLE
PERSONAL GROOMING: TOTAL
STANDING UP FROM CHAIR USING ARMS: A LOT
DRESSING REGULAR UPPER BODY CLOTHING: TOTAL
WALKING IN HOSPITAL ROOM: A LOT
MOBILITY SCORE: 15
DAILY ACTIVITIY SCORE: 8

## 2025-04-24 ASSESSMENT — PAIN SCALES - GENERAL
PAINLEVEL_OUTOF10: 3
PAINLEVEL_OUTOF10: 0 - NO PAIN
PAINLEVEL_OUTOF10: 4
PAINLEVEL_OUTOF10: 0 - NO PAIN
PAINLEVEL_OUTOF10: 7

## 2025-04-24 ASSESSMENT — PAIN - FUNCTIONAL ASSESSMENT
PAIN_FUNCTIONAL_ASSESSMENT: UNABLE TO SELF-REPORT
PAIN_FUNCTIONAL_ASSESSMENT: 0-10

## 2025-04-24 ASSESSMENT — PAIN DESCRIPTION - ORIENTATION: ORIENTATION: LOWER

## 2025-04-24 ASSESSMENT — PAIN DESCRIPTION - LOCATION: LOCATION: ABDOMEN

## 2025-04-24 NOTE — PROGRESS NOTES
"   04/24/25 1323   Discharge Planning   Home or Post Acute Services Post acute facilities (Rehab/SNF/etc)   Type of Post Acute Facility Services Skilled nursing   Expected Discharge Disposition SNF     Auth denied for [facility] SNF. P2P offered, MD aware.     SW spoke with patient's daughter at bedside. She was her caregiver for patient prior to trach- daughter states that they would need training on trach care and RN with home care. SW and daughter also discussed LTC at [facility]- daughter expressed concern about patient being there \"forever\" SW explained that patient does not necessarily need to be at LTC long term, if family's goal is home, patient can dc to [facility] LTC then discharge home once family is ready. SW will continue to follow.    Addendum: auth approved for [facility]    Addendum: Notified daughterYazmin- plan is to discharge to [facility]- today or tomorrow per MD. SW will follow.  "

## 2025-04-24 NOTE — PROGRESS NOTES
PROGRESS NOTE - INTERNAL MEDICINE     PATIENT NAME:  Evelyn Hutchinson    MRN:  [id number]  SERVICE DATE:  4/24/2025       ADMITTING PHYSICIAN:  Servando Cordova, DO    ASSESSMENT AND PLAN    Hypertension  Urinary retention, requiring [place] this admission. Failed voiding trials after 2x str cath for retention.  Acute on chronic hypoxic and hypercapnic respiratory failure, improved with a short interval mechanical ventilation support.  Pneumonia suspected, but ruled out.  Hx chronic PE.  On long-term Eliquis  Diarrhea, uncertain etiology.?  Antibiotics,?  GI infection. C diff neg  Chronic insomnia  Chronic pain syndrome  Acute on chronic diastolic HF - better  Hyperlipidemia  Hx COPD, s/p chronic tracheostomy  Chronic hyponatremia  Anemia  Hx DM2. Hyperglycemia prob stress / steroids.  2+ aortic incompetence (I35.1).  Normal LV systolic function, unable to evaluate diastolic function due to arctic incompetence, echo 1/29/2025, [facility].  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  Continue IV diuresis.  Monitor renal function closely.  check electrolytes and replete as needed  Pulmonary consult noted  Cardiology consult noted  Monitor for bleeding.  Accu-Cheks, SSI, hypoglycemia protocol.  CT ch prior to DC  PO Diet with asp precautions.        DISPOSITION PLAN:  To SNF [facility]        INTERVAL HISTORY OF PRESENT ILLNESS:  low vol sputum with suctions. anxious but better today. SOB Is better. Pain at G tube site. No chest pain/sob. No n/v/d. Tube feeds+. No new fever. acute events from last 24 hrs reviewed.     Pertinent ROS:  No abdominal pain / No Bleeding / No rashes     Discussed with nursing and case management team and the specialists involved in this patient's care. Reviewed the EMR and documentation from other care-givers.        OBJECTIVE  PHYSICAL EXAM:      GENERAL: awake, alert, resting comfortably. Anxious. +hard of hearing.  SKIN: Skin  turgor normal. No rashes  HEENT: EOMI, no epistaxis, Moist mucosa.  LUNGS: Bilat breath sounds, with no added sounds  CARDIAC: REGULAR. no rubs, no murmur  ABDOMEN: soft, non-tender. +BS.  EXTREMITIES: No edema, Good capillary refill.   NEURO: Insight fair. No invol movements. Gait not assessed  MUSCULOSKELETAL: No acute inflammation            4/24/2025     5:43 AM 4/24/2025     6:00 AM 4/24/2025     7:00 AM 4/24/2025     7:25 AM 4/24/2025     8:00 AM 4/24/2025     8:07 AM 4/24/2025     9:00 AM   Vitals   Systolic      171 109   Diastolic      76 56   Heart Rate  74 75  73  76   Temp    36.2 °C (97.1 °F)      Weight (lb) 103.84         BMI 16.77 kg/m2         BSA (m2) 1.48 m2           Body mass index is 16.77 kg/m².    Intake/Output Summary (Last 24 hours) at 4/24/2025 0903  Last data filed at 4/24/2025 0600  Gross per 24 hour   Intake 720 ml   Output 500 ml   Net 220 ml         Current Medications[1]    DATA:   Diagnostic tests reviewed for today's visit:    Most recent labs  Results from last 7 days   Lab Units 04/24/25  0521 04/23/25  0519 04/22/25  0524   WBC AUTO x10*3/uL 10.8 9.7 14.7*   HEMOGLOBIN g/dL 11.2* 10.7* 10.3*   HEMATOCRIT % 36.0 35.2* 31.9*   PLATELETS AUTO x10*3/uL 324 317 273     Results from last 7 days   Lab Units 04/24/25  0521 04/23/25  0519 04/22/25  0524 04/21/25  0810 04/20/25  0353 04/19/25  0538   SODIUM mmol/L 133* 135* 134* 137 136 132*   POTASSIUM mmol/L 3.9 3.7 3.7 3.3* 3.1* 3.9   CHLORIDE mmol/L 92* 94* 95* 95* 97* 97*   CO2 mmol/L 34* 34* 34* 37* 33* 29   BUN mg/dL 16 21 24* 29* 26* 21   CREATININE mg/dL 0.65 0.71 0.74 0.84 0.71 0.58   CALCIUM mg/dL 8.8 9.1 9.1 8.7 8.9 8.7   PROTEIN TOTAL g/dL  --   --   --  5.7* 5.8* 5.8*   BILIRUBIN TOTAL mg/dL  --   --   --  0.3 0.3 0.3   ALK PHOS U/L  --   --   --  116 141* 182*   ALT U/L  --   --   --  34 44 56*   AST U/L  --   --   --  14 21 31   GLUCOSE mg/dL 153* 191* 147* 78 180* 147*     Results from last 7 days   Lab Units  04/18/25  1923 04/18/25  1509   POCT PH, ARTERIAL pH 7.30* 7.10*   POCT PCO2, ARTERIAL mm Hg 61* 105*   POCT PO2, ARTERIAL mm Hg 172* 333*   POCT HCO3 CALCULATED, ARTERIAL mmol/L 30.0* 32.6*   POCT BASE EXCESS, ARTERIAL mmol/L 3.1* 2.0         Results from last 7 days   Lab Units 04/24/25  0535 04/23/25  2347 04/23/25  1258 04/23/25  1108 04/23/25  0607 04/23/25  0006 04/22/25  1757 04/22/25  1154 04/22/25  0435 04/21/25  2307 04/21/25 2028 04/21/25  1637   POCT GLUCOSE mg/dL 163* 87 170* 64* 218* 166* 252* 123* 158* 210* 222* 178*        FL modified barium swallow study   Final Result   Addendum (preliminary) 1 of 1   Interpreted By:  Mago Tariq,  and Radiology Admin    ADDENDUM:   Please disregard this below report, this patient was registered in   error. This radiology study is being re-signed due to an   administrative error but not reinterpreted. The new signature merely   reflects the date upon which the administrative error was corrected   for placement in patient chart. The actual interpretation took place   in a timely fashion, consistent with  policy, by the physician   referenced as interpreting the study.        Signed by: Mago Tariq 4/24/2025 8:31 AM        -------- ORIGINAL REPORT --------   Dictation workstation:   [id number]      Final   Per speech therapy:        Pt presents with mild oropharyngeal dysphagia upon completion of   modified barium swallow study this date. Swallowing physiology is   detailed above. Some calcifications noted overlaying laryngeal   vestibule, however remained unchanged as study progressed.   Impairments most impacting swallowing safety and efficiency include   delayed swallow onset, mostly complete epiglottic inversion with some   impairment in approximation. Patient demonstrated transient   penetration on consecutive straw drinks of thin barium (>4oz   consecutively), single episode of trace penetration of thin barium   via spoon with cord contact, however  ejected as swallow completed. No   further penetration was observed for any other consistency, and no   aspiration was visualized during study.        MACRO:   None        Signed by: Mago Tariq 4/23/2025 1:05 PM   Dictation workstation:   [id number]      XR chest 1 view   Final Result   1. Cardiomegaly and findings of pulmonary edema.   2. Nodular densities at the right lung base measuring 2.8 cm 1.5 cm   there is nonspecific and could represent areas of round atelectasis   or pulmonary nodules. Nonemergent chest CT recommended for further   evaluation.             MACRO:   None.        Signed by: Edu Daugherty 4/18/2025 2:36 PM   Dictation workstation:   [id number]            SIGNATURE: Byron Carrizales MD PATIENT NAME: Evelyn Hutchinson   DATE: 4/24/2025 MRN: [id number]   TIME: 9:03 AM             [1]   Current Facility-Administered Medications:     acetaminophen (Tylenol) oral liquid 1,000 mg, 1,000 mg, g-tube, q6h PRN, Eladio Lerma MD, 1,000 mg at 04/24/25 0744    amLODIPine (Norvasc) tablet 5 mg, 5 mg, oral, Daily, Brinda Jang, APRN-CNP, 5 mg at 04/23/25 0818    ammonium lactate (Lac-Hydrin) 12 % lotion 1 Application, 1 Application, Topical, Daily, Eladio Lerma MD, 1 Application at 04/23/25 0819    apixaban (Eliquis) tablet 5 mg, 5 mg, g-tube, BID, Eladio Lerma MD, 5 mg at 04/23/25 2049    aspirin chewable tablet 81 mg, 81 mg, g-tube, Daily, Eladio Lerma MD, 81 mg at 04/23/25 0818    atorvastatin (Lipitor) tablet 40 mg, 40 mg, g-tube, Nightly, Eladio Lerma MD, 40 mg at 04/23/25 2049    budesonide (Pulmicort) 0.5 mg/2 mL nebulizer solution 0.5 mg, 0.5 mg, nebulization, BID, Eladio Lerma MD, 0.5 mg at 04/24/25 0714    busPIRone (Buspar) tablet 2.5 mg, 2.5 mg, oral, q8h PRN, Byron Carrizales MD, 2.5 mg at 04/24/25 0223    carvedilol (Coreg) tablet 25 mg, 25 mg, g-tube, BID, Eladio Lerma MD, 25 mg at 04/23/25 2049    citalopram (CeleXA) tablet 10 mg, 10 mg, g-tube, Daily,  Eladio Lerma MD, 10 mg at 04/23/25 0818    dextrose 50 % injection 12.5 g, 12.5 g, intravenous, q15 min PRN, Eladio Lerma MD, 12.5 g at 04/23/25 1113    dextrose 50 % injection 25 g, 25 g, intravenous, q15 min PRN, Eladio Lerma MD    dicyclomine (Bentyl) capsule 10 mg, 10 mg, oral, 4x daily PRN, Byron Carrizales MD, 10 mg at 04/24/25 0747    esomeprazole (NexIUM) suspension 20 mg, 20 mg, g-tube, Daily before breakfast, Eladio Lerma MD, 20 mg at 04/24/25 0745    furosemide (Lasix) tablet 40 mg, 40 mg, oral, Daily, Brinda Jang, APRN-CNP, 40 mg at 04/23/25 0818    glucagon (Glucagen) injection 1 mg, 1 mg, intramuscular, q15 min PRN, Eladio Lerma MD    glucagon (Glucagen) injection 1 mg, 1 mg, intramuscular, q15 min PRN, Eladio Lerma MD    hydrALAZINE (Apresoline) injection 10 mg, 10 mg, intravenous, q4h PRN, Eladio Lerma MD, 10 mg at 04/24/25 0807    insulin lispro injection 0-10 Units, 0-10 Units, subcutaneous, q6h, Eladio Lerma MD, 2 Units at 04/24/25 0542    ipratropium-albuteroL (Duo-Neb) 0.5-2.5 mg/3 mL nebulizer solution 3 mL, 3 mL, nebulization, q2h PRN, Byron Carrizales MD, 3 mL at 04/23/25 2141    ipratropium-albuteroL (Duo-Neb) 0.5-2.5 mg/3 mL nebulizer solution 3 mL, 3 mL, nebulization, 4x daily, Byron Carrizales MD, 3 mL at 04/24/25 0714    loperamide (Imodium) capsule 2 mg, 2 mg, oral, TID PRN, Byron Carrizales MD    losartan (Cozaar) tablet 100 mg, 100 mg, g-tube, Daily, Eladio Lerma MD, 100 mg at 04/23/25 0817    melatonin tablet 6 mg, 6 mg, oral, Nightly, Eladio Lerma MD, 6 mg at 04/23/25 2049    oxyCODONE (Roxicodone) solution 2.5 mg, 2.5 mg, oral, q8h PRN, Eladio Lerma MD, 2.5 mg at 04/24/25 0223    oxygen (O2) therapy, , inhalation, Continuous PRN - O2/gases, Eladio Lerma MD, 30 percent at 04/24/25 0714    sodium chloride tablet 2 g, 2 g, oral, TID, Eladio Lerma MD, 2 g at 04/23/25 2049    tamsulosin (Flomax) 24 hr capsule 0.4 mg, 0.4 mg,  oral, Daily, Eladio Lerma MD, 0.4 mg at 04/22/25 0909

## 2025-04-24 NOTE — NURSING NOTE
Patient pressed call light 5x in last hour. Upon hourly rounding, patient requesting to be turned/repositioned (again) in bed. Pt lifted up in bed, repositioned and cleaned up from urinary incontinence. This RN instructed pt that I would return in 1 hr and I needed to check on my other patients.     5 mins later pt yelling at top of her lungs for nurse for more pillows (pt has 6+ pillows surrounding her (back/arms) This RN instructed pt again that unfortunately I needed to round on my other patients at this time and and will check on her thereafter if no other immediate needs/concerns were voiced, (none voiced at this time). Call light in reach of patient. All common use items/belongings placed in easy reach. Bed alarm on for her safety. Will continue to monitor pt further.

## 2025-04-24 NOTE — NURSING NOTE
Patient refusing to allow nursing to administer tube feeding as ordered for nocturnal feeding. Pt states she has been eating/drinking without issue since this morning. This RN attempted to re-educate patient that pt just passed MBS and oral nutrition may NOT be adequate as of yet to maintain proper nutritional requirements. MD notified pt refusing HS tube feeding, will pass along in bedside report to oncoming day nurse as well.

## 2025-04-24 NOTE — PROGRESS NOTES
04/24/25 1118   Discharge Planning   Expected Discharge Disposition SNF  ([facility])   Does the patient need discharge transport arranged? Yes   RoundTrip coordination needed? Yes   Has discharge transport been arranged? No       Was notified by discharge team that insurance want p2p:  Peer to peer is being offered  please call [phone] option 1 - this is to SCHEDULE the call, not to actually have the call. So you will need the name and # of who they will be calling.  SCHEDULE it BY 12:45 p.m. TODAY 4/24  any questions? Feel free to call Elsa the [employer] RN on the case at:  [phone]  REF #: [id number]  [employer] ID: [id number]      Dr. Carrizales called and given information for p2p.    1500    Patient was approved for SNF. Called Dr. Carrizales and he is aware of acceptance.    0260     Dr. Carrizales is placing discharge order. Requested from [facility] to set up transport for 10 am tomorrow morning. Facility made aware.

## 2025-04-24 NOTE — PROGRESS NOTES
"Evelyn Hutchinson is a 74 y.o. female on day 6 of admission presenting with Acute on chronic respiratory failure with hypercapnia.    Subjective   Feels \"fine\".  Denies shortness of breath, pain or cough.  On 30% trach collar with an oxygen saturation of 100%.  Speaking well with a Passy-Luisa valve in place.  Sputum culture is pending.  Modified barium swallow showed mild dysphagia.     Objective   Physical Exam  Vitals  Blood pressure 109/56, pulse 70, temperature 36.2 °C (97.1 °F), temperature source Temporal, resp. rate 18, height 1.676 m (5' 5.98\"), weight 47.1 kg (103 lb 13.4 oz), SpO2 100%.  Intake/Output last 3 Shifts:  I/O last 3 completed shifts:  In: 1253 (26.6 mL/kg) [P.O.:720; NG/GT:533]  Out: 700 (14.9 mL/kg) [Urine:100 (0.1 mL/kg/hr); Stool:600]  Weight: 47.1 kg     General-awake, alert and in no acute distress.  Lungs-clear, without wheezes, rales or rhonchi.    Heart-regular rate and rhythm.  Abdomen-positive bowel sounds.  Extremities-no edema.  Skin-no rashes.    Scheduled Medications[1]     Results for orders placed or performed during the hospital encounter of 04/18/25 (from the past 24 hours)   POCT GLUCOSE   Result Value Ref Range    POCT Glucose 170 (H) 74 - 99 mg/dL   POCT GLUCOSE   Result Value Ref Range    POCT Glucose 87 74 - 99 mg/dL   Renal function panel   Result Value Ref Range    Glucose 153 (H) 74 - 99 mg/dL    Sodium 133 (L) 136 - 145 mmol/L    Potassium 3.9 3.5 - 5.3 mmol/L    Chloride 92 (L) 98 - 107 mmol/L    Bicarbonate 34 (H) 21 - 32 mmol/L    Anion Gap 11 10 - 20 mmol/L    Urea Nitrogen 16 6 - 23 mg/dL    Creatinine 0.65 0.50 - 1.05 mg/dL    eGFR >90 >60 mL/min/1.73m*2    Calcium 8.8 8.6 - 10.3 mg/dL    Phosphorus 2.9 2.5 - 4.9 mg/dL    Albumin 3.3 (L) 3.4 - 5.0 g/dL   Magnesium   Result Value Ref Range    Magnesium 1.53 (L) 1.60 - 2.40 mg/dL   CBC and Auto Differential   Result Value Ref Range    WBC 10.8 4.4 - 11.3 x10*3/uL    nRBC 0.0 0.0 - 0.0 /100 WBCs    RBC 3.93 (L) " 4.00 - 5.20 x10*6/uL    Hemoglobin 11.2 (L) 12.0 - 16.0 g/dL    Hematocrit 36.0 36.0 - 46.0 %    MCV 92 80 - 100 fL    MCH 28.5 26.0 - 34.0 pg    MCHC 31.1 (L) 32.0 - 36.0 g/dL    RDW 15.9 (H) 11.5 - 14.5 %    Platelets 324 150 - 450 x10*3/uL    Neutrophils % 65.9 40.0 - 80.0 %    Immature Granulocytes %, Automated 0.4 0.0 - 0.9 %    Lymphocytes % 21.7 13.0 - 44.0 %    Monocytes % 7.3 2.0 - 10.0 %    Eosinophils % 4.3 0.0 - 6.0 %    Basophils % 0.4 0.0 - 2.0 %    Neutrophils Absolute 7.13 (H) 1.60 - 5.50 x10*3/uL    Immature Granulocytes Absolute, Automated 0.04 0.00 - 0.50 x10*3/uL    Lymphocytes Absolute 2.35 0.80 - 3.00 x10*3/uL    Monocytes Absolute 0.79 0.05 - 0.80 x10*3/uL    Eosinophils Absolute 0.47 (H) 0.00 - 0.40 x10*3/uL    Basophils Absolute 0.04 0.00 - 0.10 x10*3/uL   POCT GLUCOSE   Result Value Ref Range    POCT Glucose 163 (H) 74 - 99 mg/dL      Assessment:  Improved mental status and acute-on-chronic hypercapnic and hypoxemic  respiratory failure due to congestive heart failure/pulmonary edema and possible superimposed aspiration pneumonia.  There is no bronchospasm.  She appears to be clinically stable on 30% trach collar with a Passy-Luisa valve in place.    Recommend:  1.  Continue DuoNeb, Pulmicort, Lasix and Eliquis.  2.  Wean FiO2 to keep oxygen saturation approximately 92 to 95%.  3.  Check sputum culture--pending.  4.  Check venous blood gas.  5.  Consider CT scan of the chest as an outpatient.  6.  Follow-up with pulmonary medicine and ENT after discharge.  7.  The patient is DNR.  8.  Overall, she appears to be clinically stable for discharge from a pulmonary standpoint.    Andrew Rodriguez MD         [1] amLODIPine, 5 mg, oral, Daily  ammonium lactate, 1 Application, Topical, Daily  apixaban, 5 mg, g-tube, BID  aspirin, 81 mg, g-tube, Daily  atorvastatin, 40 mg, g-tube, Nightly  budesonide, 0.5 mg, nebulization, BID  carvedilol, 25 mg, g-tube, BID  citalopram, 10 mg, g-tube,  Daily  esomeprazole, 20 mg, g-tube, Daily before breakfast  furosemide, 40 mg, oral, Daily  insulin lispro, 0-10 Units, subcutaneous, q6h  ipratropium-albuteroL, 3 mL, nebulization, 4x daily  losartan, 100 mg, g-tube, Daily  melatonin, 6 mg, oral, Nightly  sodium chloride, 2 g, oral, TID  tamsulosin, 0.4 mg, oral, Daily

## 2025-04-24 NOTE — PROGRESS NOTES
Physical Therapy                 Therapy Communication Note    Patient Name: Pancho Hutchinson  MRN: [id number]  Department: [address]  Room: [address] [place], [place] [address]  Today's Date: 4/24/2025     Discipline: Physical Therapy    PT Missed Visit: Yes     Missed Visit Reason: Missed Visit Reason:  (pt stated she didn't get any sleep last night and is very tired. declined all activity at this time. RN aware)    Missed Time: Attempt    Comment:

## 2025-04-24 NOTE — CARE PLAN
The patient's goals for the shift include      The clinical goals for the shift include pt will remain HDS and free from Respiratory distress throughout the shift    Problem: Skin  Goal: Promote/optimize nutrition  Outcome: Progressing  Goal: Participates in plan/prevention/treatment measures  Outcome: Progressing  Goal: Prevent/manage excess moisture  Outcome: Progressing  Goal: Prevent/minimize sheer/friction injuries  Outcome: Progressing     Problem: Safety - Adult  Goal: Free from fall injury  Outcome: Progressing     Problem: Chronic Conditions and Co-morbidities  Goal: Patient's chronic conditions and co-morbidity symptoms are monitored and maintained or improved  Outcome: Progressing     Problem: Nutrition  Goal: Nutrient intake appropriate for maintaining nutritional needs  Outcome: Progressing     Problem: Fall/Injury  Goal: Not fall by end of shift  Outcome: Progressing  Goal: Be free from injury by end of the shift  Outcome: Progressing  Goal: Verbalize understanding of personal risk factors for fall in the hospital  Outcome: Progressing  Goal: Verbalize understanding of risk factor reduction measures to prevent injury from fall in the home  Outcome: Progressing  Goal: Use assistive devices by end of the shift  Outcome: Progressing     Problem: Pain  Goal: Takes deep breaths with improved pain control throughout the shift  Outcome: Progressing  Goal: Turns in bed with improved pain control throughout the shift  Outcome: Progressing  Goal: Free from opioid side effects throughout the shift  Outcome: Progressing  Goal: Free from acute confusion related to pain meds throughout the shift  Outcome: Progressing     Problem: Respiratory  Goal: Clear secretions with interventions this shift  Outcome: Progressing  Goal: Minimize anxiety/maximize coping throughout shift  Outcome: Progressing  Goal: Minimal/no exertional discomfort or dyspnea this shift  Outcome: Progressing  Goal: No signs of respiratory distress  (eg. Use of accessory muscles. Peds grunting)  Outcome: Progressing  Goal: Patent airway maintained this shift  Outcome: Progressing  Goal: Tolerate mechanical ventilation evidenced by VS/agitation level this shift  Outcome: Progressing  Goal: Tolerate pulmonary toileting this shift  Outcome: Progressing  Goal: Verbalize decreased shortness of breath this shift  Outcome: Progressing  Goal: Wean oxygen to maintain O2 saturation per order/standard this shift  Outcome: Progressing  Goal: Increase self care and/or family involvement in next 24 hours  Outcome: Progressing

## 2025-04-25 VITALS
SYSTOLIC BLOOD PRESSURE: 115 MMHG | TEMPERATURE: 97 F | DIASTOLIC BLOOD PRESSURE: 54 MMHG | BODY MASS INDEX: 16.69 KG/M2 | HEART RATE: 78 BPM | OXYGEN SATURATION: 98 % | HEIGHT: 66 IN | RESPIRATION RATE: 18 BRPM | WEIGHT: 103.84 LBS

## 2025-04-25 LAB
ALBUMIN SERPL BCP-MCNC: 3.1 G/DL (ref 3.4–5)
ANION GAP SERPL CALC-SCNC: 9 MMOL/L (ref 10–20)
BASOPHILS # BLD AUTO: 0.04 X10*3/UL (ref 0–0.1)
BASOPHILS NFR BLD AUTO: 0.5 %
BUN SERPL-MCNC: 15 MG/DL (ref 6–23)
CALCIUM SERPL-MCNC: 8.9 MG/DL (ref 8.6–10.3)
CHLORIDE SERPL-SCNC: 94 MMOL/L (ref 98–107)
CO2 SERPL-SCNC: 35 MMOL/L (ref 21–32)
CREAT SERPL-MCNC: 0.65 MG/DL (ref 0.5–1.05)
EGFRCR SERPLBLD CKD-EPI 2021: >90 ML/MIN/1.73M*2
EOSINOPHIL # BLD AUTO: 0.34 X10*3/UL (ref 0–0.4)
EOSINOPHIL NFR BLD AUTO: 4.3 %
ERYTHROCYTE [DISTWIDTH] IN BLOOD BY AUTOMATED COUNT: 16 % (ref 11.5–14.5)
GLUCOSE SERPL-MCNC: 172 MG/DL (ref 74–99)
HCT VFR BLD AUTO: 35.4 % (ref 36–46)
HGB BLD-MCNC: 11.2 G/DL (ref 12–16)
HOLD SPECIMEN: NORMAL
IMM GRANULOCYTES # BLD AUTO: 0.02 X10*3/UL (ref 0–0.5)
IMM GRANULOCYTES NFR BLD AUTO: 0.3 % (ref 0–0.9)
LYMPHOCYTES # BLD AUTO: 1.46 X10*3/UL (ref 0.8–3)
LYMPHOCYTES NFR BLD AUTO: 18.4 %
MAGNESIUM SERPL-MCNC: 1.52 MG/DL (ref 1.6–2.4)
MCH RBC QN AUTO: 28.7 PG (ref 26–34)
MCHC RBC AUTO-ENTMCNC: 31.6 G/DL (ref 32–36)
MCV RBC AUTO: 91 FL (ref 80–100)
MONOCYTES # BLD AUTO: 0.54 X10*3/UL (ref 0.05–0.8)
MONOCYTES NFR BLD AUTO: 6.8 %
NEUTROPHILS # BLD AUTO: 5.53 X10*3/UL (ref 1.6–5.5)
NEUTROPHILS NFR BLD AUTO: 69.7 %
NRBC BLD-RTO: 0 /100 WBCS (ref 0–0)
PHOSPHATE SERPL-MCNC: 3.2 MG/DL (ref 2.5–4.9)
PLATELET # BLD AUTO: 283 X10*3/UL (ref 150–450)
POTASSIUM SERPL-SCNC: 3.6 MMOL/L (ref 3.5–5.3)
RBC # BLD AUTO: 3.9 X10*6/UL (ref 4–5.2)
SODIUM SERPL-SCNC: 134 MMOL/L (ref 136–145)
WBC # BLD AUTO: 7.9 X10*3/UL (ref 4.4–11.3)

## 2025-04-25 PROCEDURE — 82947 ASSAY GLUCOSE BLOOD QUANT: CPT

## 2025-04-25 PROCEDURE — 2500000001 HC RX 250 WO HCPCS SELF ADMINISTERED DRUGS (ALT 637 FOR MEDICARE OP): Performed by: ANESTHESIOLOGY

## 2025-04-25 PROCEDURE — 80069 RENAL FUNCTION PANEL: CPT | Performed by: ANESTHESIOLOGY

## 2025-04-25 PROCEDURE — 2500000001 HC RX 250 WO HCPCS SELF ADMINISTERED DRUGS (ALT 637 FOR MEDICARE OP): Performed by: NURSE PRACTITIONER

## 2025-04-25 PROCEDURE — 85025 COMPLETE CBC W/AUTO DIFF WBC: CPT | Performed by: ANESTHESIOLOGY

## 2025-04-25 PROCEDURE — 94640 AIRWAY INHALATION TREATMENT: CPT

## 2025-04-25 PROCEDURE — 83735 ASSAY OF MAGNESIUM: CPT | Performed by: ANESTHESIOLOGY

## 2025-04-25 PROCEDURE — 2500000005 HC RX 250 GENERAL PHARMACY W/O HCPCS: Performed by: ANESTHESIOLOGY

## 2025-04-25 PROCEDURE — 36415 COLL VENOUS BLD VENIPUNCTURE: CPT | Performed by: ANESTHESIOLOGY

## 2025-04-25 PROCEDURE — 2500000002 HC RX 250 W HCPCS SELF ADMINISTERED DRUGS (ALT 637 FOR MEDICARE OP, ALT 636 FOR OP/ED): Performed by: INTERNAL MEDICINE

## 2025-04-25 PROCEDURE — 2500000002 HC RX 250 W HCPCS SELF ADMINISTERED DRUGS (ALT 637 FOR MEDICARE OP, ALT 636 FOR OP/ED): Performed by: ANESTHESIOLOGY

## 2025-04-25 PROCEDURE — 2500000001 HC RX 250 WO HCPCS SELF ADMINISTERED DRUGS (ALT 637 FOR MEDICARE OP): Performed by: INTERNAL MEDICINE

## 2025-04-25 RX ADMIN — Medication 30 PERCENT: at 06:25

## 2025-04-25 RX ADMIN — BUSPIRONE HYDROCHLORIDE 2.5 MG: 5 TABLET ORAL at 08:43

## 2025-04-25 RX ADMIN — LOSARTAN POTASSIUM 100 MG: 50 TABLET, FILM COATED ORAL at 08:42

## 2025-04-25 RX ADMIN — APIXABAN 5 MG: 5 TABLET, FILM COATED ORAL at 08:43

## 2025-04-25 RX ADMIN — IPRATROPIUM BROMIDE AND ALBUTEROL SULFATE 3 ML: 2.5; .5 SOLUTION RESPIRATORY (INHALATION) at 06:25

## 2025-04-25 RX ADMIN — TAMSULOSIN HYDROCHLORIDE 0.4 MG: 0.4 CAPSULE ORAL at 08:43

## 2025-04-25 RX ADMIN — IPRATROPIUM BROMIDE AND ALBUTEROL SULFATE 3 ML: 2.5; .5 SOLUTION RESPIRATORY (INHALATION) at 09:05

## 2025-04-25 RX ADMIN — ESOMEPRAZOLE MAGNESIUM 20 MG: 40 FOR SUSPENSION ORAL at 08:00

## 2025-04-25 RX ADMIN — FUROSEMIDE 40 MG: 40 TABLET ORAL at 08:43

## 2025-04-25 RX ADMIN — OXYCODONE HYDROCHLORIDE 2.5 MG: 5 SOLUTION ORAL at 03:36

## 2025-04-25 RX ADMIN — Medication 30 PERCENT: at 04:01

## 2025-04-25 RX ADMIN — AMLODIPINE BESYLATE 5 MG: 5 TABLET ORAL at 08:43

## 2025-04-25 RX ADMIN — SODIUM CHLORIDE 2 G: 1 TABLET ORAL at 08:42

## 2025-04-25 RX ADMIN — INSULIN LISPRO 2 UNITS: 100 INJECTION, SOLUTION INTRAVENOUS; SUBCUTANEOUS at 06:23

## 2025-04-25 RX ADMIN — CITALOPRAM HYDROBROMIDE 10 MG: 20 TABLET ORAL at 08:43

## 2025-04-25 RX ADMIN — BUDESONIDE 0.5 MG: 0.5 INHALANT RESPIRATORY (INHALATION) at 06:25

## 2025-04-25 RX ADMIN — CARVEDILOL 25 MG: 25 TABLET, FILM COATED ORAL at 08:43

## 2025-04-25 RX ADMIN — ACETAMINOPHEN 1000 MG: 160 SOLUTION ORAL at 06:28

## 2025-04-25 RX ADMIN — ASPIRIN 81 MG CHEWABLE TABLET 81 MG: 81 TABLET CHEWABLE at 08:42

## 2025-04-25 ASSESSMENT — PAIN DESCRIPTION - LOCATION
LOCATION: ABDOMEN
LOCATION: BUTTOCKS

## 2025-04-25 ASSESSMENT — PAIN - FUNCTIONAL ASSESSMENT
PAIN_FUNCTIONAL_ASSESSMENT: 0-10

## 2025-04-25 ASSESSMENT — PAIN SCALES - GENERAL
PAINLEVEL_OUTOF10: 6
PAINLEVEL_OUTOF10: 2
PAINLEVEL_OUTOF10: 1
PAINLEVEL_OUTOF10: 3

## 2025-04-25 ASSESSMENT — PAIN DESCRIPTION - ORIENTATION: ORIENTATION: MID

## 2025-04-25 ASSESSMENT — PAIN DESCRIPTION - DESCRIPTORS
DESCRIPTORS: PATIENT UNABLE TO DESCRIBE
DESCRIPTORS: ACHING

## 2025-04-25 NOTE — DISCHARGE SUMMARY
Discharge Summary    Admit Date: 4/18/2025  Discharge Date: 4/25/2025      Discharge Diagnosis  Acute on chronic hypoxic and hypercapnic respiratory failure, improved with a short interval mechanical ventilation support.  Pneumonia suspected, but ruled out.  Hypertension  Urinary retention, requiring East this admission. Failed voiding trials after 2x str cath for retention.  Hx chronic PE.  On long-term Eliquis  Diarrhea, uncertain etiology.?  Antibiotics,?  GI infection. C diff neg  Chronic insomnia  Chronic pain syndrome  Acute on chronic diastolic HF - better  Hyperlipidemia  Hx COPD, s/p chronic tracheostomy  Chronic hyponatremia  Anemia  Hx DM2. Hyperglycemia prob stress / steroids.  2+ aortic incompetence (I35.1).  Normal LV systolic function, unable to evaluate diastolic function due to arctic incompetence, echo 1/29/2025, [facility].  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  After diuresis, she was able to be weaned off to trach collar.  Transferred to the floor where she remained stable.  She had diarrhea that was negative for C. difficile.  Imodium started.  Transferred back to nursing home in stable condition.  Needs outpatient cardiology follow-up regarding the 2+ arctic incompetence.    Pertinent Physical Exam At Time of Discharge  Physical Exam    Home Medications     Medication List      START taking these medications     amLODIPine 5 mg tablet; Commonly known as: Norvasc; Take 1 tablet (5 mg)   by mouth once daily.   budesonide 0.5 mg/2 mL nebulizer solution; Commonly known as: Pulmicort;   Take 2 mL (0.5 mg) by nebulization 2 times a day. Rinse mouth with water    after use to reduce aftertaste and incidence of candidiasis. Do not   swallow.   busPIRone 5 mg tablet; Commonly known as: Buspar; Take 0.5 tablets (2.5   mg) by mouth every 8 hours if needed (anxiety) for up to 5 days.   furosemide 40 mg tablet; Commonly known as: Lasix; Take 1 tablet (40 mg)   by mouth once daily.   loperamide 2 mg capsule; Commonly known as: Imodium; Take 1 capsule (2   mg) by mouth 3 times a day as needed for diarrhea.     CONTINUE taking these medications     acetaminophen 500 mg tablet; Commonly known as: Tylenol   ammonium lactate 12 % lotion; Commonly known as: Lac-Hydrin   aspirin 81 mg EC tablet   atorvastatin 40 mg tablet; Commonly known as: Lipitor   carvedilol 25 mg tablet; Commonly known as: Coreg   citalopram 10 mg tablet; Commonly known as: CeleXA   Eliquis 5 mg tablet; Generic drug: apixaban   fluticasone 50 mcg/actuation nasal spray; Commonly known as: Flonase   ipratropium-albuteroL 0.5-2.5 mg/3 mL nebulizer solution; Commonly known   as: Duo-Neb   losartan 50 mg tablet; Commonly known as: Cozaar   melatonin 3 mg tablet   oxyCODONE 5 mg immediate release tablet; Commonly known as: Roxicodone   pantoprazole 40 mg EC tablet; Commonly known as: ProtoNix   sodium chloride 1,000 mg tablet   tamsulosin 0.4 mg 24 hr capsule; Commonly known as: Flomax     STOP taking these medications     HumaLOG KwikPen Insulin 100 unit/mL pen; Generic drug: insulin lispro   LORazepam 1 mg tablet; Commonly known as: Ativan       Outpatient Follow-Up  No future appointments.    Byron Carrizales MD

## 2025-04-25 NOTE — PROGRESS NOTES
PROGRESS NOTE - INTERNAL MEDICINE     PATIENT NAME:  Pancho Hutchinson    MRN:  [id number]  SERVICE DATE:  4/25/2025       ADMITTING PHYSICIAN:  Servando Cordova, DO    ASSESSMENT AND PLAN    Acute on chronic hypoxic and hypercapnic respiratory failure, improved with a short interval mechanical ventilation support.  Pneumonia suspected, but ruled out.  Hypertension  Urinary retention, requiring East this admission. Failed voiding trials after 2x str cath for retention.  Hx chronic PE.  On long-term Eliquis  Diarrhea, uncertain etiology.?  Antibiotics,?  GI infection. C diff neg  Chronic insomnia  Chronic pain syndrome  Acute on chronic diastolic HF - better  Hyperlipidemia  Hx COPD, s/p chronic tracheostomy  Chronic hyponatremia  Anemia  Hx DM2. Hyperglycemia prob stress / steroids.  2+ aortic incompetence (I35.1).  Normal LV systolic function, unable to evaluate diastolic function due to arctic incompetence, echo 1/29/2025, [facility].  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  PO diuresis.  Monitor renal function closely.  check electrolytes and replete as needed  Pulmonary consult noted  Cardiology consult noted  Monitor for bleeding.  Accu-Cheks, SSI, hypoglycemia protocol.  OP Pulm follow up and CT chest  PO Diet with asp precautions.        Discharge is planned for today to SNF. Hospital course , medication changes and Investigation's conducted were reviewed with the patient / Family. Activity, Diet and Medications after discharge were reviewed with the patient / Family. Medication reconciliation done - pls refer to medication reconciliation sheet.Follow up with Primary Care Physician were reviewed with the patient / Family. Discharge planning was discussed with staff. Refer to discharge orders sheet. Total time to coordinate disch process incl counseling family, coordinating with other care givers,  and pharmacist was >35  min.         INTERVAL HISTORY OF PRESENT ILLNESS:  low vol sputum with suctions. anxious but better today. SOB Is better. Pain at G tube site. No chest pain/sob. No n/v/d. Tube feeds+. No new fever. acute events from last 24 hrs reviewed.     Pertinent ROS:  No abdominal pain / No Bleeding / No rashes     Discussed with nursing and case management team and the specialists involved in this patient's care. Reviewed the EMR and documentation from other care-givers.        OBJECTIVE  PHYSICAL EXAM:      GENERAL: awake, alert, resting comfortably. Anxious. +hard of hearing.  SKIN: Skin turgor normal. No rashes  HEENT: EOMI, no epistaxis, Moist mucosa.  LUNGS: Bilat breath sounds, with no added sounds  CARDIAC: REGULAR. no rubs, no murmur  ABDOMEN: soft, non-tender. +BS.  EXTREMITIES: No edema, Good capillary refill.   NEURO: Insight fair. No invol movements. Gait not assessed  MUSCULOSKELETAL: No acute inflammation              4/24/2025     8:54 PM 4/24/2025    11:00 PM 4/25/2025    12:00 AM 4/25/2025    12:09 AM 4/25/2025     3:00 AM 4/25/2025     6:40 AM 4/25/2025     7:00 AM   Vitals   Systolic 134 132  98 149 187 139   Diastolic 57 56  51 67 157 102   BP Location    Left arm      Heart Rate 65  59   80 78   Temp   36 °C (96.8 °F)       Resp 14  14   22 17     Body mass index is 16.77 kg/m².    Intake/Output Summary (Last 24 hours) at 4/25/2025 0737  Last data filed at 4/25/2025 0600  Gross per 24 hour   Intake 520 ml   Output 150 ml   Net 370 ml         Current Medications[1]    DATA:   Diagnostic tests reviewed for today's visit:    Most recent labs  Results from last 7 days   Lab Units 04/25/25  0548 04/24/25  0521 04/23/25  0519   WBC AUTO x10*3/uL 7.9 10.8 9.7   HEMOGLOBIN g/dL 11.2* 11.2* 10.7*   HEMATOCRIT % 35.4* 36.0 35.2*   PLATELETS AUTO x10*3/uL 283 324 317     Results from last 7 days   Lab Units 04/25/25  0548 04/24/25  0521 04/23/25  0519 04/22/25  0524 04/21/25  0810 04/20/25  0353 04/19/25  0538    SODIUM mmol/L 134* 133* 135*   < > 137 136 132*   POTASSIUM mmol/L 3.6 3.9 3.7   < > 3.3* 3.1* 3.9   CHLORIDE mmol/L 94* 92* 94*   < > 95* 97* 97*   CO2 mmol/L 35* 34* 34*   < > 37* 33* 29   BUN mg/dL 15 16 21   < > 29* 26* 21   CREATININE mg/dL 0.65 0.65 0.71   < > 0.84 0.71 0.58   CALCIUM mg/dL 8.9 8.8 9.1   < > 8.7 8.9 8.7   PROTEIN TOTAL g/dL  --   --   --   --  5.7* 5.8* 5.8*   BILIRUBIN TOTAL mg/dL  --   --   --   --  0.3 0.3 0.3   ALK PHOS U/L  --   --   --   --  116 141* 182*   ALT U/L  --   --   --   --  34 44 56*   AST U/L  --   --   --   --  14 21 31   GLUCOSE mg/dL 172* 153* 191*   < > 78 180* 147*    < > = values in this interval not displayed.     Results from last 7 days   Lab Units 04/18/25  1923 04/18/25  1509   POCT PH, ARTERIAL pH 7.30* 7.10*   POCT PCO2, ARTERIAL mm Hg 61* 105*   POCT PO2, ARTERIAL mm Hg 172* 333*   POCT HCO3 CALCULATED, ARTERIAL mmol/L 30.0* 32.6*   POCT BASE EXCESS, ARTERIAL mmol/L 3.1* 2.0         Results from last 7 days   Lab Units 04/24/25  2212 04/24/25  1729 04/24/25  1129 04/24/25  0535 04/23/25  2347 04/23/25  1258 04/23/25  1108 04/23/25  0607 04/23/25  0006 04/22/25  1757 04/22/25  1154 04/22/25  0435   POCT GLUCOSE mg/dL 191* 83 171* 163* 87 170* 64* 218* 166* 252* 123* 158*        FL modified barium swallow study   Final Result   Addendum (preliminary) 1 of 1   Interpreted By:  Mago Tariq,  and Radiology Admin    ADDENDUM:   Please disregard this below report, this patient was registered in   error. This radiology study is being re-signed due to an   administrative error but not reinterpreted. The new signature merely   reflects the date upon which the administrative error was corrected   for placement in patient chart. The actual interpretation took place   in a timely fashion, consistent with  policy, by the physician   referenced as interpreting the study.        Signed by: Mago Tariq 4/24/2025 8:31 AM        -------- ORIGINAL REPORT --------   Dictation  workstation:   [id number]      Final   Per speech therapy:        Pt presents with mild oropharyngeal dysphagia upon completion of   modified barium swallow study this date. Swallowing physiology is   detailed above. Some calcifications noted overlaying laryngeal   vestibule, however remained unchanged as study progressed.   Impairments most impacting swallowing safety and efficiency include   delayed swallow onset, mostly complete epiglottic inversion with some   impairment in approximation. Patient demonstrated transient   penetration on consecutive straw drinks of thin barium (>4oz   consecutively), single episode of trace penetration of thin barium   via spoon with cord contact, however ejected as swallow completed. No   further penetration was observed for any other consistency, and no   aspiration was visualized during study.        MACRO:   None        Signed by: Mago Tariq 4/23/2025 1:05 PM   Dictation workstation:   [id number]      XR chest 1 view   Final Result   1. Cardiomegaly and findings of pulmonary edema.   2. Nodular densities at the right lung base measuring 2.8 cm 1.5 cm   there is nonspecific and could represent areas of round atelectasis   or pulmonary nodules. Nonemergent chest CT recommended for further   evaluation.             MACRO:   None.        Signed by: Edu Daugherty 4/18/2025 2:36 PM   Dictation workstation:   [id number]            SIGNATURE: Byron Carrizales MD PATIENT NAME: Pancho Hutchinson   DATE: 4/25/2025 MRN: [id number]   TIME: 7:37 AM             [1]   Current Facility-Administered Medications:     acetaminophen (Tylenol) oral liquid 1,000 mg, 1,000 mg, g-tube, q6h PRN, Eladio Lerma MD, 1,000 mg at 04/25/25 0628    amLODIPine (Norvasc) tablet 5 mg, 5 mg, oral, Daily, Brinda Jang, APRN-CNP, 5 mg at 04/24/25 0930    ammonium lactate (Lac-Hydrin) 12 % lotion 1 Application, 1 Application, Topical, Daily, Eladio Lerma MD, 1 Application at 04/24/25 0932     apixaban (Eliquis) tablet 5 mg, 5 mg, g-tube, BID, Eladio Lerma MD, 5 mg at 04/24/25 2049    aspirin chewable tablet 81 mg, 81 mg, g-tube, Daily, Eladio Lerma MD, 81 mg at 04/24/25 0930    atorvastatin (Lipitor) tablet 40 mg, 40 mg, g-tube, Nightly, Eladio Lerma MD, 40 mg at 04/24/25 2049    budesonide (Pulmicort) 0.5 mg/2 mL nebulizer solution 0.5 mg, 0.5 mg, nebulization, BID, lEadio Lerma MD, 0.5 mg at 04/25/25 0625    busPIRone (Buspar) tablet 2.5 mg, 2.5 mg, oral, q8h PRN, Byron Carrizales MD, 2.5 mg at 04/24/25 1054    carvedilol (Coreg) tablet 25 mg, 25 mg, g-tube, BID, Eladio Lerma MD, 25 mg at 04/24/25 2049    citalopram (CeleXA) tablet 10 mg, 10 mg, g-tube, Daily, Eladio Lerma MD, 10 mg at 04/24/25 0940    dextrose 50 % injection 12.5 g, 12.5 g, intravenous, q15 min PRN, Eladio Lerma MD, 12.5 g at 04/23/25 1113    dextrose 50 % injection 25 g, 25 g, intravenous, q15 min PRN, Eladio Lerma MD    dicyclomine (Bentyl) capsule 10 mg, 10 mg, oral, 4x daily PRN, Byron Carrizales MD, 10 mg at 04/24/25 2050    esomeprazole (NexIUM) suspension 20 mg, 20 mg, g-tube, Daily before breakfast, Eladio Lerma MD, 20 mg at 04/24/25 0745    furosemide (Lasix) tablet 40 mg, 40 mg, oral, Daily, Brinda Jang, APRN-CNP, 40 mg at 04/24/25 0904    glucagon (Glucagen) injection 1 mg, 1 mg, intramuscular, q15 min PRN, Eladio Lerma MD    glucagon (Glucagen) injection 1 mg, 1 mg, intramuscular, q15 min PRN, Eladio Lerma MD    hydrALAZINE (Apresoline) injection 10 mg, 10 mg, intravenous, q4h PRN, Eladio Lerma MD, 10 mg at 04/24/25 0807    insulin lispro injection 0-10 Units, 0-10 Units, subcutaneous, q6h, Eladio Lerma MD, 2 Units at 04/25/25 0623    ipratropium-albuteroL (Duo-Neb) 0.5-2.5 mg/3 mL nebulizer solution 3 mL, 3 mL, nebulization, q2h PRN, Byron Carrizales MD, 3 mL at 04/23/25 2141    ipratropium-albuteroL (Duo-Neb) 0.5-2.5 mg/3 mL nebulizer solution 3 mL, 3 mL,  nebulization, 4x daily, Byron Carrizales MD, 3 mL at 04/25/25 0625    loperamide (Imodium) capsule 2 mg, 2 mg, oral, TID PRN, Byron Carrizales MD    losartan (Cozaar) tablet 100 mg, 100 mg, g-tube, Daily, Eladio Lerma MD, 100 mg at 04/24/25 0904    melatonin tablet 6 mg, 6 mg, oral, Nightly, Eladio Lerma MD, 6 mg at 04/24/25 2049    oxyCODONE (Roxicodone) solution 2.5 mg, 2.5 mg, oral, q8h PRN, Eladio Lerma MD, 2.5 mg at 04/25/25 0336    oxygen (O2) therapy, , inhalation, Continuous PRN - O2/gases, Eladio Lerma MD, 30 percent at 04/25/25 0625    sodium chloride tablet 2 g, 2 g, oral, TID, Eladio Lerma MD, 2 g at 04/24/25 2049    tamsulosin (Flomax) 24 hr capsule 0.4 mg, 0.4 mg, oral, Daily, Eladio Lerma MD, 0.4 mg at 04/24/25 0918

## 2025-04-25 NOTE — PROGRESS NOTES
04/25/25 0946   Discharge Planning   Home or Post Acute Services Post acute facilities (Rehab/SNF/etc)   Type of Post Acute Facility Services Skilled nursing   Expected Discharge Disposition SNF  ([place]  [facility])   Does the patient need discharge transport arranged? Yes   RoundTrip coordination needed? Yes   Has discharge transport been arranged? Yes   What day is the transport expected? 04/25/25   What time is the transport expected? 1000  ([phone]. facility, care team and daughter aware. dsc to send dc paperwork to facility)

## 2025-04-25 NOTE — PROGRESS NOTES
04/25/25 0945   Discharge Planning   Expected Discharge Disposition SNF  ([facility])   Does the patient need discharge transport arranged? Yes   RoundTrip coordination needed? Yes   Has discharge transport been arranged? Yes   What day is the transport expected? 04/25/25  (family aware and facility)   What time is the transport expected? 1000  (report [phone], ask for purple unit)        Transport confirmed for 10 am. Everyone aware. Bedside nurse provided with report number- [phone], and to ask for purple unit. Patient is going to [facility].

## 2025-05-05 LAB
GLUCOSE BLD MANUAL STRIP-MCNC: 174 MG/DL (ref 74–99)
GLUCOSE BLD MANUAL STRIP-MCNC: 185 MG/DL (ref 74–99)
GLUCOSE BLD MANUAL STRIP-MCNC: 274 MG/DL (ref 74–99)
GLUCOSE BLD MANUAL STRIP-MCNC: 40 MG/DL (ref 74–99)
GLUCOSE BLD MANUAL STRIP-MCNC: 65 MG/DL (ref 74–99)

## 2025-06-08 NOTE — ED PROCEDURE NOTE
Procedure  Critical Care    Performed by: Dain Berkowitz MD  Authorized by: Dain Berkowitz MD    Critical care provider statement:     Critical care time (minutes):  65    Critical care time was exclusive of:  Separately billable procedures and treating other patients    Critical care was necessary to treat or prevent imminent or life-threatening deterioration of the following conditions:  Respiratory failure    Critical care was time spent personally by me on the following activities:  Blood draw for specimens, development of treatment plan with patient or surrogate, discussions with consultants, evaluation of patient's response to treatment, examination of patient, review of old charts, re-evaluation of patient's condition, pulse oximetry, ordering and review of radiographic studies, ordering and review of laboratory studies, ordering and performing treatments and interventions, ventilator management and obtaining history from patient or surrogate    Care discussed with: admitting provider                 Dain Berkowitz MD  06/07/25 3028